# Patient Record
Sex: FEMALE | Employment: UNEMPLOYED | URBAN - METROPOLITAN AREA
[De-identification: names, ages, dates, MRNs, and addresses within clinical notes are randomized per-mention and may not be internally consistent; named-entity substitution may affect disease eponyms.]

---

## 2017-01-30 ENCOUNTER — OFFICE VISIT (OUTPATIENT)
Dept: FAMILY MEDICINE CLINIC | Age: 57
End: 2017-01-30

## 2017-01-30 VITALS
RESPIRATION RATE: 16 BRPM | HEART RATE: 77 BPM | OXYGEN SATURATION: 96 % | SYSTOLIC BLOOD PRESSURE: 140 MMHG | WEIGHT: 185.2 LBS | HEIGHT: 63 IN | DIASTOLIC BLOOD PRESSURE: 80 MMHG | TEMPERATURE: 98.6 F | BODY MASS INDEX: 32.82 KG/M2

## 2017-01-30 DIAGNOSIS — M72.2 PLANTAR FASCIITIS: Primary | ICD-10-CM

## 2017-01-30 DIAGNOSIS — Z12.39 BREAST CANCER SCREENING: ICD-10-CM

## 2017-01-30 DIAGNOSIS — E66.9 OBESITY, UNSPECIFIED OBESITY SEVERITY, UNSPECIFIED OBESITY TYPE: ICD-10-CM

## 2017-01-30 DIAGNOSIS — Z00.00 ROUTINE ADULT HEALTH MAINTENANCE: ICD-10-CM

## 2017-01-30 NOTE — PATIENT INSTRUCTIONS
TODAY, go to:   CHECK OUT    Please schedule the following appointments:  · Weight follow up with Dr. Anita Keys in 4 weeks     · Lab visit in May   · CPE the week after with Dr. Anita Keys    _____________________     Today you were seen for:  1. Plantar fasciitis   - do home exercises  - use ice  - continue mobic  - continue massage  - start PT    2. Start qsymia  Follow the instructions on the card  _____________________     Review your health maintenance below. Make plans to return and address anything that is due or will be due soon. Health Maintenance Due   Topic Date Due    Hepatitis C Test  1960    Stool testing for trace blood  05/28/2010    Breast Cancer Screening  11/16/2016          Plantar Fasciitis: Care Instructions  Your Care Instructions    Plantar fasciitis is pain and inflammation of the plantar fascia, the tissue at the bottom of your foot that connects the heel bone to the toes. The plantar fascia also supports the arch. If you strain the plantar fascia, it can develop small tears and cause heel pain when you stand or walk. Plantar fasciitis can be caused by running or other sports. It also may occur in people who are overweight or who have high arches or flat feet. You may get plantar fasciitis if you walk or stand for long periods, or have a tight Achilles tendon or calf muscles. You can improve your foot pain with rest and other care at home. It might take a few weeks to a few months for your foot to heal completely. Follow-up care is a key part of your treatment and safety. Be sure to make and go to all appointments, and call your doctor if you are having problems. It's also a good idea to know your test results and keep a list of the medicines you take. How can you care for yourself at home? · Rest your feet often. Reduce your activity to a level that lets you avoid pain. If possible, do not run or walk on hard surfaces. · Take pain medicines exactly as directed.   ¨ If the doctor gave you a prescription medicine for pain, take it as prescribed. ¨ If you are not taking a prescription pain medicine, take an over-the-counter anti-inflammatory medicine for pain and swelling, such as ibuprofen (Advil, Motrin) or naproxen (Aleve). Read and follow all instructions on the label. · Use ice massage to help with pain and swelling. You can use an ice cube or an ice cup several times a day. To make an ice cup, fill a paper cup with water and freeze it. Cut off the top of the cup until a half-inch of ice shows. Hold onto the remaining paper to use the cup. Rub the ice in small circles over the area for 5 to 7 minutes. · Contrast baths, which alternate hot and cold water, can also help reduce swelling. But because heat alone may make pain and swelling worse, end a contrast bath with a soak in cold water. · Wear a night splint if your doctor suggests it. A night splint holds your foot with the toes pointed up and the foot and ankle at a 90-degree angle. This position gives the bottom of your foot a constant, gentle stretch. · Do simple exercises such as calf stretches and towel stretches 2 to 3 times each day, especially when you first get up in the morning. These can help the plantar fascia become more flexible. They also make the muscles that support your arch stronger. Hold these stretches for 15 to 30 seconds per stretch. Repeat 2 to 4 times. ¨ Stand about 1 foot from a wall. Place the palms of both hands against the wall at chest level. Lean forward against the wall, keeping one leg with the knee straight and heel on the ground while bending the knee of the other leg. ¨ Sit down on the floor or a mat with your feet stretched in front of you. Roll up a towel lengthwise, and loop it over the ball of your foot. Holding the towel at both ends, gently pull the towel toward you to stretch your foot. · Wear shoes with good arch support.  Athletic shoes or shoes with a well-cushioned sole are good choices. · Try heel cups or shoe inserts (orthotics) to help cushion your heel. You can buy these at many shoe stores. · Put on your shoes as soon as you get out of bed. Going barefoot or wearing slippers may make your pain worse. · Reach and stay at a good weight for your height. This puts less strain on your feet. When should you call for help? Call your doctor now or seek immediate medical care if:  · You have heel pain with fever, redness, or warmth in your heel. · You cannot put weight on the sore foot. Watch closely for changes in your health, and be sure to contact your doctor if:  · You have numbness or tingling in your heel. · Your heel pain lasts more than 2 weeks. Where can you learn more? Go to http://candice-carina.info/. Eris Caballero in the search box to learn more about \"Plantar Fasciitis: Care Instructions. \"  Current as of: May 23, 2016  Content Version: 11.1  © 6458-2372 [employer]. Care instructions adapted under license by [employer] (which disclaims liability or warranty for this information). If you have questions about a medical condition or this instruction, always ask your healthcare professional. [address] any warranty or liability for your use of this information. Plantar Fasciitis: Exercises  Your Care Instructions  Here are some examples of typical rehabilitation exercises for your condition. Start each exercise slowly. Ease off the exercise if you start to have pain. Your doctor or physical therapist will tell you when you can start these exercises and which ones will work best for you. How to do the exercises  Note: Each exercise should create a pulling feeling but should not cause pain. Towel stretch    4. Sit with your legs extended and knees straight. 5. Place a towel around your foot just under the toes. 6. Hold each end of the towel in each hand, with your hands above your knees.   7. Pull back with the towel so that your foot stretches toward you. 8. Hold the position for at least 15 to 30 seconds. 9. Repeat 2 to 4 times a session, up to 5 sessions a day. Calf stretch    Note: This exercise stretches the muscles at the back of the lower leg (the calf) and the Achilles tendon. Do this exercise 3 or 4 times a day, 5 days a week. 2. Stand facing a wall with your hands on the wall at about eye level. Put the leg you want to stretch about a step behind your other leg. 3. Keeping your back heel on the floor, bend your front knee until you feel a stretch in the back leg. 4. Hold the stretch for 15 to 30 seconds. Repeat 2 to 4 times. Plantar fascia and calf stretch    Note: Stretching the plantar fascia and calf muscles can increase flexibility and decrease heel pain. You can do this exercise several times each day and before and after activity. 1. Stand on a step as shown above. Be sure to hold on to the banister. 2. Slowly let your heels down over the edge of the step as you relax your calf muscles. You should feel a gentle stretch across the bottom of your foot and up the back of your leg to your knee. 3. Hold the stretch about 15 to 30 seconds, and then tighten your calf muscle a little to bring your heel back up to the level of the step. Repeat 2 to 4 times. Towel curls    1. While sitting, place your foot on a towel on the floor and scrunch the towel toward you with your toes. 2. Then, also using your toes, push the towel away from you. Note: Make this exercise more challenging by placing a weighted object, such as a soup can, on the other end of the towel. [place] pickups    1. Put marbles on the floor next to a cup.  2. Using your toes, try to lift the marbles up from the floor and put them in the cup. Follow-up care is a key part of your treatment and safety. Be sure to make and go to all appointments, and call your doctor if you are having problems.  It's also a good idea to know your test results and keep a list of the medicines you take. Where can you learn more? Go to http://candice-carina.info/. Neeta Naranjo in the search box to learn more about \"Plantar Fasciitis: Exercises. \"  Current as of: May 23, 2016  Content Version: 11.1  © 5636-3231 [employer]. Care instructions adapted under license by [employer] (which disclaims liability or warranty for this information). If you have questions about a medical condition or this instruction, always ask your healthcare professional. [address] any warranty or liability for your use of this information.

## 2017-01-30 NOTE — PROGRESS NOTES
Chief Complaint   Patient presents with   [facility]    Foot Pain     Heel pain started over a month ago. 1. Have you been to the ER, urgent care clinic since your last visit? Hospitalized since your last visit? No    2. Have you seen or consulted any other health care providers outside of the [address] [place] [address] since your last visit? Include any pap smears or colon screening. No     Due a Mammogram and a screening colonoscopy. Had her flu shot at [facility].  I have reviewed Health Maintenance with the patient and updated.   Advance Care Planning information reviewed and given to the patient at a previous vist.

## 2017-01-30 NOTE — PROGRESS NOTES
[address] Visit   Patient ID:   Danny Quezada is a 64 y.o. female. Assessment/Plan:    Ashley Lee was seen today for establish care and foot pain. Diagnoses and all orders for this visit:    Plantar fasciitis  Reviewed home exercises, referral to PT. See pt instructions for more  -     REFERRAL TO PHYSICAL THERAPY    Obesity, unspecified obesity severity, unspecified obesity type  Had success with phentermine in past. Trial qsymia. Pt signed up for prescription card today. F/u in 4 months. Counseled on risks. Will need to monitor BP  -     phentermine-topiramate (QSYMIA) 7.5-46 mg CM24; Take 1 Tab by mouth daily. Max Daily Amount: 1 Tab. Take 1/2 tab for the first 14 days. Then increase to 1 tab daily. Breast cancer screening  -     [facility] MAMMO BI SCREENING INCL CAD; Future        Counselled pt on:  Patient health concerns. Lifestyle changes, importance of physical activity and healthy food choices, home PT exercises for feet. Counseled on numerous obesity medication options. Labs ordered for May CPE. Patient was offered a choice/choices in the treatment plan today. Patient expresses understanding of the plan and agrees with recommendations. Patient Instructions     TODAY, go to:   CHECK OUT    Please schedule the following appointments:  · Weight follow up with Dr. Gill Streeter in 4 weeks     · Lab visit in May   · CPE the week after with Dr. Gill Streeter    _____________________     Today you were seen for:  1. Plantar fasciitis   - do home exercises  - use ice  - continue mobic  - continue massage  - start PT    2. Start qsymia  Follow the instructions on the card  _____________________     Review your health maintenance below. Make plans to return and address anything that is due or will be due soon. Health Maintenance Due   Topic Date Due    Hepatitis C Test  1960    Stool testing for trace blood  05/28/2010    Breast Cancer Screening  11/16/2016     ?   Subjective: HPI:  January Hamilton is a 64 y.o. female being seen for:   Chief Complaint   Patient presents with   [address] Establish Care    Foot Pain     Heel pain started over a month ago. Establish Care  Past medical history, surgical history, social history, family history, medications, allergies reviewed and updated. See below for more detail    Heel pain  · Had bone spur in past 5-6 years, had injection  · started hurting again about a month ago  · Worse in night and then when getting up after that  · B/l heels  · Takes mobic daily  · Wears reasonable shoes    Weight concern  · Used phentermine 2 or 3 times in the past  · Tried to start again and had tingling all over so stop  · Phentermine worked well decreasing her wt  · She is thinking about going back to the gym  · She has decreased rice and bread, eating just salad. Screening and Prevention Due:  Health Maintenance Due   Topic Date Due    Hepatitis C Screening  1960    FOBT Q 1 YEAR AGE 50-75  05/28/2010    BREAST CANCER SCRN MAMMOGRAM  11/16/2016        Review of Systems  Otherwise, per HPI  Active Problem List:  Patient Active Problem List   Diagnosis Code    Vitamin D deficiency E55.9    Heel spur M77.30    Iron deficiency anemia D50.9    Fibrocystic breast changes N60.19    Other and unspecified hyperlipidemia E78.5    Back pain M54.9    Leg pain, left M79.605    Hyperglycemia R73.9    Hypertension I10     ? Objective:     Visit Vitals    /80 (BP 1 Location: Right arm, BP Patient Position: Sitting)    Pulse 77    Temp 98.6 °F (37 °C) (Oral)    Resp 16    Ht 5' 3\" (1.6 m)    Wt 185 lb 3.2 oz (84 kg)    SpO2 96%    BMI 32.81 kg/m2     PHQ 2 / 9, over the last two weeks 6/28/2016   Little interest or pleasure in doing things Not at all   Feeling down, depressed or hopeless Not at all   Total Score PHQ 2 0       Physical Exam   Constitutional: She appears well-developed and well-nourished. No distress.    Pulmonary/Chest: Effort normal.   Neurological: She is alert. Psychiatric: She has a normal mood and affect. Her behavior is normal.   . .Left Ankle Exam   Swelling: None. Tenderness   The patient is experiencing tenderness in the plantar heel. Range of Motion   Dorsiflexion:     Normal  Plantar flexion: Normal  Inversion:         Normal  Eversion:         Normal    Muscle Strength   Dorsiflexion:        5/5  Plantar flexion:      5/5  Anterior tibial:        5/5  Posterior tibial:      5/5  Gastrosoleus:        Peroneal muscle:    Tests   Anterior drawer: Negative. Varus tilt: Negative. Comments:  Skin intact    Right Ankle Exam   Swelling: None    Tenderness   The patient is experiencing tenderness in the plantar heel. Range of Motion   Dorsiflexion:     Normal  Plantar flexion: Normal  Inversion:         Normal  Eversion:         Normal    Muscle Strength   Dorsiflexion:       5/5  Plantar flexion:     5/5  Anterior tibial:       5/5  Posterior tibial:     5/5  Gastrosoleus:       Peroneal muscle:    Tests   Anterior drawer: Negative  Varus tilt: NegativeComments  Skin intact          Allergies   Allergen Reactions    Pcn [Penicillins] Hives    Lisinopril Cough     Dry cough    Nsaids (Non-Steroidal Anti-Inflammatory Drug) Other (comments)     Abdominal pain     Prior to Admission medications    Medication Sig Start Date End Date Taking? Authorizing Provider   phentermine-topiramate [facility]) 7.5-46 mg CM24 Take 1 Tab by mouth daily. Max Daily Amount: 1 Tab. Take 1/2 tab for the first 14 days. Then increase to 1 tab daily. 1/30/17  Yes Alyx Conner. Marilyn Braun MD   meloxicam (MOBIC) 15 mg tablet Take 15 mg by mouth daily. Yes Historical Provider   atorvastatin (LIPITOR) 20 mg tablet Take 1 Tab by mouth daily.  5/24/16  Yes Og Lopez MD   omeprazole (PRILOSEC) 10 mg capsule TAKE ONE CAPSULE BY MOUTH ONE TIME DAILY  2/3/16  Yes Og Lopez MD   losartan (COZAAR) 50 mg tablet TAKE ONE TABLET BY MOUTH ONE TIME DAILY 8/30/15  Yes Wes Beaulieu MD   cholecalciferol, vitamin D3, (VITAMIN D3) 2,000 unit Tab Take 5,000 Int'l Units by mouth daily. Yes Historical Provider     . Sukhdev Dickson Past Medical History   Diagnosis Date    Advanced care planning/counseling discussion 5/24/16    Allergic rhinitis, cause unspecified 10/2003    Breast mass, right 10/12/11     Right. Dr. Giorgi Pineda. benign. .    Carpal tunnel syndrome 09/2012     bilaterally. Dr. Martin Figures.  Chest pain 03/2012     Dr. Edel Tang    Elevated alkaline phosphatase level 05/16/04     125    Essential hypertension, benign 09/18/12    Fibrocystic breast changes     Heel spur 2010     Left. Dr. Cody Bhardwaj    Herpes zoster 07/2003     Right chest.    Hyperglycemia 8/23/2012    Iron deficiency anemia 10/2003    Menorrhagia 2006     due to endometrial polyps. Dr. Cordon Hence Other and unspecified hyperlipidemia 1766    Ovarian follicular cyst 73/47/30     bilaterally.  Tennis elbow 09/2012     Right. Dr. Bridger Brady.  Trigger finger 09/2012     Left thumb. Right middle. Dr. Bridger Brady.  Unspecified vitamin D deficiency 04/2009       Past Surgical History   Procedure Laterality Date    Hx dilation and curettage  12/18/2006     due to heavy menses, endometrial polyp benign. Dr. Pa Dutta.    Hx tubal ligation  1996    Us guided breast biopsy Right 10/12/2011     Right. Dr. Giorgi Pineda. Benign. 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         Social History Main Topics    Smoking status: Never Smoker    Smokeless tobacco: Never Used    Alcohol use No    Drug use: No    Sexual activity: Yes     Partners: Male     Birth control/ protection: Surgical      Comment: monogamous with  since 1980     Other Topics Concern     Service No    Blood Transfusions No    Caffeine Concern Yes     3 cups tea daily, 2 in AM and 1 in PM    Occupational Exposure No    Hobby Hazards No    Sleep Concern Yes     9-10 hours daily, wakes up at 7:30 and after son leaves for school, takes 1 hour nap    Stress Concern Yes     planning elaborate wedding for daughter    Weight Concern No    Special Diet No    Back Care No    Exercise No     doesn't     Social History Narrative       OB History      Para Term  AB TAB SAB Ectopic Multiple Living    5 4   1     4        Obstetric Comments    Postmenopausal. LMP around 47 yo  Patient's last menstrual period was 12/15/2011.   H/o abnl pap: yes            Family History   Problem Relation Age of Onset    Elevated Lipids Mother     Hypertension Mother     High Cholesterol Mother     Asthma Mother    Milton Cords Arthritis-osteo Mother     Hypertension Father     Heart Disease Father     Heart Attack Father 72    Diabetes Father     Heart Disease Brother 61     pacemaker    Hypertension Brother     Diabetes Brother     Asthma Sister    Milton Cords Migraines Sister

## 2017-05-01 DIAGNOSIS — Z00.00 ROUTINE ADULT HEALTH MAINTENANCE: ICD-10-CM

## 2017-05-24 LAB
ALBUMIN SERPL-MCNC: 4.5 G/DL (ref 3.5–5.5)
ALBUMIN/GLOB SERPL: 2.1 {RATIO} (ref 1.2–2.2)
ALP SERPL-CCNC: 131 IU/L (ref 39–117)
ALT SERPL-CCNC: 72 IU/L (ref 0–32)
AST SERPL-CCNC: 46 IU/L (ref 0–40)
BILIRUB SERPL-MCNC: 0.5 MG/DL (ref 0–1.2)
BUN SERPL-MCNC: 14 MG/DL (ref 6–24)
BUN/CREAT SERPL: 22 (ref 9–23)
CALCIUM SERPL-MCNC: 9.4 MG/DL (ref 8.7–10.2)
CHLORIDE SERPL-SCNC: 101 MMOL/L (ref 96–106)
CHOLEST SERPL-MCNC: 174 MG/DL (ref 100–199)
CO2 SERPL-SCNC: 26 MMOL/L (ref 18–29)
CREAT SERPL-MCNC: 0.64 MG/DL (ref 0.57–1)
ERYTHROCYTE [DISTWIDTH] IN BLOOD BY AUTOMATED COUNT: 13.5 % (ref 12.3–15.4)
EST. AVERAGE GLUCOSE BLD GHB EST-MCNC: 157 MG/DL
GLOBULIN SER CALC-MCNC: 2.1 G/DL (ref 1.5–4.5)
GLUCOSE SERPL-MCNC: 131 MG/DL (ref 65–99)
HBA1C MFR BLD: 7.1 % (ref 4.8–5.6)
HCT VFR BLD AUTO: 41.4 % (ref 34–46.6)
HCV AB S/CO SERPL IA: <0.1 S/CO RATIO (ref 0–0.9)
HDLC SERPL-MCNC: 41 MG/DL
HGB BLD-MCNC: 13.5 G/DL (ref 11.1–15.9)
INTERPRETATION, 910389: NORMAL
LDLC SERPL CALC-MCNC: 83 MG/DL (ref 0–99)
MCH RBC QN AUTO: 29.2 PG (ref 26.6–33)
MCHC RBC AUTO-ENTMCNC: 32.6 G/DL (ref 31.5–35.7)
MCV RBC AUTO: 89 FL (ref 79–97)
PLATELET # BLD AUTO: 237 X10E3/UL (ref 150–379)
POTASSIUM SERPL-SCNC: 4.6 MMOL/L (ref 3.5–5.2)
PROT SERPL-MCNC: 6.6 G/DL (ref 6–8.5)
RBC # BLD AUTO: 4.63 X10E6/UL (ref 3.77–5.28)
SODIUM SERPL-SCNC: 142 MMOL/L (ref 134–144)
TRIGL SERPL-MCNC: 251 MG/DL (ref 0–149)
VLDLC SERPL CALC-MCNC: 50 MG/DL (ref 5–40)
WBC # BLD AUTO: 7.2 X10E3/UL (ref 3.4–10.8)

## 2017-05-30 ENCOUNTER — OFFICE VISIT (OUTPATIENT)
Dept: FAMILY MEDICINE CLINIC | Age: 57
End: 2017-05-30

## 2017-05-30 VITALS
SYSTOLIC BLOOD PRESSURE: 140 MMHG | OXYGEN SATURATION: 96 % | HEART RATE: 88 BPM | HEIGHT: 63 IN | RESPIRATION RATE: 20 BRPM | TEMPERATURE: 97.5 F | DIASTOLIC BLOOD PRESSURE: 88 MMHG | WEIGHT: 184.3 LBS | BODY MASS INDEX: 32.66 KG/M2

## 2017-05-30 DIAGNOSIS — M77.30 HEEL SPUR, UNSPECIFIED LATERALITY: ICD-10-CM

## 2017-05-30 DIAGNOSIS — Z00.00 WELL WOMAN EXAM WITHOUT GYNECOLOGICAL EXAM: Primary | ICD-10-CM

## 2017-05-30 DIAGNOSIS — L81.9 PIGMENTED SKIN LESION: ICD-10-CM

## 2017-05-30 DIAGNOSIS — I10 ESSENTIAL HYPERTENSION: ICD-10-CM

## 2017-05-30 DIAGNOSIS — E55.9 VITAMIN D DEFICIENCY: ICD-10-CM

## 2017-05-30 DIAGNOSIS — E11.9 CONTROLLED TYPE 2 DIABETES MELLITUS WITHOUT COMPLICATION, WITHOUT LONG-TERM CURRENT USE OF INSULIN (HCC): ICD-10-CM

## 2017-05-30 DIAGNOSIS — R79.89 ELEVATED LFTS: ICD-10-CM

## 2017-05-30 DIAGNOSIS — E78.1 HIGH TRIGLYCERIDES: ICD-10-CM

## 2017-05-30 RX ORDER — METFORMIN HYDROCHLORIDE 500 MG/1
TABLET, EXTENDED RELEASE ORAL
Qty: 120 TAB | Refills: 2 | Status: SHIPPED | OUTPATIENT
Start: 2017-05-30 | End: 2017-12-04 | Stop reason: SDUPTHER

## 2017-05-30 NOTE — MR AVS SNAPSHOT
Visit Information Date & Time Provider Department Dept. Phone Encounter #  
 5/30/2017  4:20 PM MD Jay PrietoNarcisa [phone] [id number] Upcoming Health Maintenance Date Due FOBT Q 1 YEAR AGE 50-75 5/28/2010 PAP AKA CERVICAL CYTOLOGY 6/28/2017 INFLUENZA AGE 9 TO ADULT 8/1/2017 BREAST CANCER SCRN MAMMOGRAM 5/15/2018 DTaP/Tdap/Td series (2 - Td) 4/15/2019 Allergies as of 5/30/2017  Review Complete On: 5/30/2017 By: Ben Pagan LPN Severity Noted Reaction Type Reactions Pcn [Penicillins] High 04/16/2010    Hives Lisinopril  01/23/2013   Side Effect Cough Dry cough Nsaids (Non-steroidal Anti-inflammatory Drug)  12/03/2012    Other (comments) Abdominal pain Current Immunizations  Reviewed on 1/30/2017 Name Date Influenza Vaccine 10/1/2016, 10/11/2013 Influenza Vaccine (Quad) PF 10/7/2014 Influenza Vaccine Split 10/15/2012 TDAP Vaccine 4/15/2009 Not reviewed this visit You Were Diagnosed With   
  
 Codes Comments Well woman exam without gynecological exam    -  Primary ICD-10-CM: Z00.00 ICD-9-CM: V70.0 Vitamin D deficiency     ICD-10-CM: E55.9 ICD-9-CM: 268.9 Heel spur, unspecified laterality     ICD-10-CM: M77.30 ICD-9-CM: 726.73 Essential hypertension     ICD-10-CM: I10 
ICD-9-CM: 401.9 High triglycerides     ICD-10-CM: E78.1 ICD-9-CM: 272.1 Controlled type 2 diabetes mellitus without complication, without long-term current use of insulin ([facility]     ICD-10-CM: E11.9 ICD-9-CM: 250.00 Elevated LFTs     ICD-10-CM: R94.5 ICD-9-CM: 790.6 Pigmented skin lesion     ICD-10-CM: L81.9 ICD-9-CM: 709.00 Vitals BP Pulse Temp Resp Height(growth percentile) Weight(growth percentile) 140/88 88 97.5 °F (36.4 °C) (Oral) 20 5' 3\" (1.6 m) 184 lb 4.8 oz (83.6 kg) LMP SpO2 BMI OB Status Smoking Status 12/15/2011 96% 32.65 kg/m2 Postmenopausal Never Smoker BMI and BSA Data Body Mass Index Body Surface Area  
 32.65 kg/m 2 1.93 m 2 Preferred Pharmacy Pharmacy Name Phone [facility] Jada Griffith [facility] [phone] Your Updated Medication List  
  
   
This list is accurate as of: 5/30/17  5:45 PM.  Always use your most recent med list.  
  
  
  
  
 atorvastatin 20 mg tablet Commonly known as:  LIPITOR Take 1 Tab by mouth daily. losartan 50 mg tablet Commonly known as:  COZAAR  
TAKE ONE TABLET BY MOUTH ONE TIME DAILY  
  
 meloxicam 15 mg tablet Commonly known as:  MOBIC Take 15 mg by mouth daily. metFORMIN  mg tablet Commonly known as:  GLUCOPHAGE XR Week 1: 1 tab by mouth daily. Week 2: take 1 tab by mouth twice a day. Week 4: take 2 tab by mouth twice a day. omeprazole 10 mg capsule Commonly known as:  PRILOSEC  
TAKE ONE CAPSULE BY MOUTH ONE TIME DAILY  
  
 VITAMIN D3 2,000 unit Tab Generic drug:  cholecalciferol (vitamin D3) Take 5,000 Int'l Units by mouth daily. Prescriptions Sent to Pharmacy Refills  
 metFORMIN ER (GLUCOPHAGE XR) 500 mg tablet 2 Sig: Week 1: 1 tab by mouth daily. Week 2: take 1 tab by mouth twice a day. Week 4: take 2 tab by mouth twice a day. Class: Normal  
 Pharmacy: [facility] [address] [facility], [address] [place] [address] Ph #: [phone] We Performed the Following HEMOGLOBIN A1C WITH EAG [43839 CPT(R)] METABOLIC PANEL, COMPREHENSIVE [53303 CPT(R)] Patient Instructions TODAY, please go to: CHECK OUT Please schedule the following appointments at [facility] OUT: 
· Lab visit, NOT fasting in 3 months · Diabetes, weight, HTN follow up with Dr. Alyse Gunn the week after labs 
 
_____________________ Today's Plan: 
 
 
Hepatitis C test is negative.  
Your red blood cell count is normal.  
Your white blood cell count is normal.  
 Your platelet count is normal.  
Your kidney function is normal.  
 
Your liver tests are elevated. You have Diabetes. This means your blood sugars are higher than normal on average. The following can help you improve and control your blood sugar. · Food Choices · Be Mindful of CARBOHYDRATES. Carbohydrates= sugars. This means breads, pasta, rice, chips, desserts, starchy vegetables, etc. 
· Decrease how OFTEN you have carbohydrates · Decrease how MUCH carbohydrates you eat at one time · Choose carbs that are better for you, like whole grain. · Try to eliminate sugar from your drinks. (Soda, sweet tea, lemonade, juice, gatorade, alcohol, etc). · Let Dr. Arash Pyle know if you would like to see Nutrition to discuss more · Physical Activity · Move more. · Try to walk 150 minutes per week. · Try to walk 10,000 steps per day · Consider counting your steps on your smart phone. · Weight Loss · Moving more and eating better are good for you no matter what. · If you go enough of them you can achieve a healthier weight. This will help your blood sugars. · Medication · Sometimes you need medicine to help. Metformin can help. You would take this by mouth every day.  
 
_____________________ Your blood pressure is too high. Overtime your blood pressure can hurt your body, making it more likely for you to have heart disease, stroke, kidney disease, etc. It is important to get your blood pressure in better control. Your goal Blood pressure is less than 140/90. The top number is called the systolic blood pressure. The bottom number is called the diastolic blood pressure. If you check your blood pressure at home, write it down and bring it to your follow up appointments. If you have mychart you can enter them there for Dr. Arash Pyle to review. The following can help you improve and control your blood pressure. Food Choices · Learn about the DASH diet · Be Mindful of SODIUM. · Sodium can hide in lots of foods. · Let Dr. Adalgisa Sanders know if you would like to see Nutrition to discuss more Physical Activity · Move more. · Try to walk 150 minutes per week. · Try to walk 10,000 steps per day · Consider counting your steps on your smart phone. Weight Loss · Moving more and eating better are good for you no matter what. · If you go enough of them you can achieve a healthier weight. This will help your blood sugars. Alcohol · Decrease or stop drinking alcohol. Sleep Apnea · If you have sleep apnea, make sure you are using your CPAP as advised. Medication · Often people need medication. Be sure to take as advised. .  
 
 
_____________________ Review your health maintenance below. Make plans to return and address anything that is due or will be due soon. Health Maintenance Due Topic Date Due  Stool testing for trace blood  05/28/2010  Cervical Cancer Screening  06/28/2017  
 
 
 
_____________________ Are you stressed out? Overwhelmed? In pain? Feeling disconnected? Consider MINDFULNESS. .. 
[email or web address] 
_____________________ If you need to get your labs done at [facility], visit this site to find a convenient location: [email or web address] DASH Diet: Care Instructions Your Care Instructions The DASH diet is an eating plan that can help lower your blood pressure. DASH stands for Dietary Approaches to Stop Hypertension. Hypertension is high blood pressure. The DASH diet focuses on eating foods that are high in calcium, potassium, and magnesium. These nutrients can lower blood pressure. The foods that are highest in these nutrients are fruits, vegetables, low-fat dairy products, nuts, seeds, and legumes.  But taking calcium, potassium, and magnesium supplements instead of eating foods that are high in those nutrients does not have the same effect. The DASH diet also includes whole grains, fish, and poultry. The DASH diet is one of several lifestyle changes your doctor may recommend to lower your high blood pressure. Your doctor may also want you to decrease the amount of sodium in your diet. Lowering sodium while following the DASH diet can lower blood pressure even further than just the DASH diet alone. Follow-up care is a key part of your treatment and safety. Be sure to make and go to all appointments, and call your doctor if you are having problems. It's also a good idea to know your test results and keep a list of the medicines you take. How can you care for yourself at home? Following the DASH diet · Eat 4 to 5 servings of fruit each day. A serving is 1 medium-sized piece of fruit, ½ cup chopped or canned fruit, 1/4 cup dried fruit, or 4 ounces (½ cup) of fruit juice. Choose fruit more often than fruit juice. · Eat 4 to 5 servings of vegetables each day. A serving is 1 cup of lettuce or raw leafy vegetables, ½ cup of chopped or cooked vegetables, or 4 ounces (½ cup) of vegetable juice. Choose vegetables more often than vegetable juice. · Get 2 to 3 servings of low-fat and fat-free dairy each day. A serving is 8 ounces of milk, 1 cup of yogurt, or 1 ½ ounces of cheese. · Eat 6 to 8 servings of grains each day. A serving is 1 slice of bread, 1 ounce of dry cereal, or ½ cup of cooked rice, pasta, or cooked cereal. Try to choose whole-grain products as much as possible. · Limit lean meat, poultry, and fish to 2 servings each day. A serving is 3 ounces, about the size of a deck of cards. · Eat 4 to 5 servings of nuts, seeds, and legumes (cooked dried beans, lentils, and split peas) each week. A serving is 1/3 cup of nuts, 2 tablespoons of seeds, or ½ cup of cooked beans or peas. · Limit fats and oils to 2 to 3 servings each day. A serving is 1 teaspoon of vegetable oil or 2 tablespoons of salad dressing. · Limit sweets and added sugars to 5 servings or less a week. A serving is 1 tablespoon jelly or jam, ½ cup sorbet, or 1 cup of lemonade. · Eat less than 2,300 milligrams (mg) of sodium a day. If you limit your sodium to 1,500 mg a day, you can lower your blood pressure even more. Tips for success · Start small. Do not try to make dramatic changes to your diet all at once. You might feel that you are missing out on your favorite foods and then be more likely to not follow the plan. Make small changes, and stick with them. Once those changes become habit, add a few more changes. · Try some of the following: ¨ Make it a goal to eat a fruit or vegetable at every meal and at snacks. This will make it easy to get the recommended amount of fruits and vegetables each day. ¨ Try yogurt topped with fruit and nuts for a snack or healthy dessert. ¨ Add lettuce, tomato, cucumber, and onion to sandwiches. ¨ Combine a ready-made pizza crust with low-fat mozzarella cheese and lots of vegetable toppings. Try using tomatoes, squash, spinach, broccoli, carrots, cauliflower, and onions. ¨ Have a variety of cut-up vegetables with a low-fat dip as an appetizer instead of chips and dip. ¨ Sprinkle sunflower seeds or chopped almonds over salads. Or try adding chopped walnuts or almonds to cooked vegetables. ¨ Try some vegetarian meals using beans and peas. Add garbanzo or kidney beans to salads. Make burritos and tacos with mashed gonzalez beans or black beans. Where can you learn more? Go to http://candice-carina.info/. Enter P302 in the search box to learn more about \"DASH Diet: Care Instructions. \" Current as of: March 23, 2016 Content Version: 11.2 © 4771-4973 [employer]. Care instructions adapted under license by [employer] (which disclaims liability or warranty for this information).  If you have questions about a medical condition or this instruction, always ask your healthcare professional. [address] any warranty or liability for your use of this information. Mediterranean Diet: Care Instructions Your Care Instructions The Mediterranean diet features foods eaten in [place], [place], [place] and [place], and other countries that border the [facility]. It emphasizes eating a diet rich in fruits, vegetables, nuts, and high-fiber grains, and limits meat, cheese, and sweets. The Mediterranean diet may: · Prevent heart disease and lower the risk of a heart attack or stroke. · Prevent type 2 diabetes. · Prevent Alzheimer's disease and other dementia. · Prevent depression. · Prevent Parkinson's disease. This diet contains more fat than other heart-healthy diets. But the fats are mainly from nuts, unsaturated oils, such as fish oils, olive oil, and certain nut or seed oils (such as canola, soybean, or flaxseed oil). These types of oils may help protect the heart and blood vessels. Follow-up care is a key part of your treatment and safety. Be sure to make and go to all appointments, and call your doctor if you are having problems. It's also a good idea to know your test results and keep a list of the medicines you take. How can you care for yourself at home? What to eat · Eat a variety of fruits and vegetables each day, such as grapes, blueberries, tomatoes, broccoli, peppers, figs, olives, spinach, eggplant, beans, lentils, and chickpeas. · Eat a variety of whole-grain foods each day, such as oats, brown rice, and whole wheat bread, pasta, and couscous. · Eat fish at least 2 times a week. Try tuna, salmon, mackerel, lake trout, herring, or sardines. · Eat moderate amounts of low-fat dairy products, such as milk, cheese, or yogurt. · Eat moderate amounts of poultry and eggs.  
· Choose healthy (unsaturated) fats, such as nuts, olive oil and certain nut or seed oils like canola, soybean, and flaxseed. · Limit unhealthy (saturated) fats, such as butter, palm oil, and coconut oil. And limit fats found in animal products, such as meat and dairy products made with whole milk. Try to eat red meat only a few times a month in very small amounts. · Limit sweets and desserts to only a few times a week. This includes sugar-sweetened drinks like soda. The Mediterranean diet may also include red wine with your meal1 glass each day for women and up to 2 glasses a day for men. Tips for changing your diet · Dip bread in a mix of olive oil and fresh herbs instead of using butter. · Add avocado slices to your sandwich instead of montemayor. · Have fish for lunch or dinner instead of red meat. Brush the fish with olive oil, and broil or grill it. · Sprinkle your salad with seeds or nuts instead of cheese. · Cook with olive or canola oil instead of butter or oils that are high in saturated fat. · Switch from 2% milk or whole milk to 1% or fat-free milk. · Dip raw vegetables in a vinaigrette dressing or hummus instead of dips made from mayonnaise or sour cream. 
· Have a piece of fruit for dessert instead of a piece of cake. Try baked apples, or have some dried fruit. Part of the Mediterranean diet is being active. Get at least 30 minutes of exercise on most days of the week. Walking is a good choice. You also may want to do other activities, such as running, swimming, cycling, or playing tennis or team sports. Where can you learn more? Go to http://candice-carina.info/. Enter O407 in the search box to learn more about \"Mediterranean Diet: Care Instructions. \" Current as of: October 21, 2016 Content Version: 11.2 © 6816-7121 [employer]. Care instructions adapted under license by [employer] (which disclaims liability or warranty for this information).  If you have questions about a medical condition or this instruction, always ask your healthcare professional. [address] any warranty or liability for your use of this information. Hyperlipidemia: After Your Visit Your Care Instructions Hyperlipidemia is too much fat in your blood. The body has several kinds of fat, including cholesterol and triglycerides. Your body needs fat for many things, such as making new cells. But too much fat in your blood increases your chances of having a heart attack or stroke. You may be able to lower your cholesterol and triglycerides with a heart-healthy diet, exercise, and if needed, medicine. Your doctor may want you to try lifestyle changes first to see whether they lower the fat in your blood. You may need to take medicine if lifestyle changes do not lower the fat in your blood enough. Follow-up care is a key part of your treatment and safety. Be sure to make and go to all appointments, and call your doctor if you are having problems. Its also a good idea to know your test results and keep a list of the medicines you take. How can you care for yourself at home? Take your medicines · Take your medicines exactly as prescribed. Call your doctor if you think you are having a problem with your medicine. · If you take medicine to lower your cholesterol, go to follow-up visits. You will need to have blood tests. · Do not take large doses of niacin, which is a B vitamin, while taking medicine called statins. It may increase the chance of muscle pain and liver problems. · Talk to your doctor about avoiding grapefruit juice if you are taking statins. Grapefruit juice can raise the level of this medicine in your blood. This could increase side effects. Eat more fruits, vegetables, and fiber · Fruits and vegetables have lots of nutrients that help protect against heart disease, and they have littleif anyfat.  Try to eat at least five servings a day. Dark green, deep orange, or yellow fruits and vegetables are healthy choices. · Keep carrots, celery, and other veggies handy for snacks. Buy fruit that is in season and store it where you can see it so that you will be tempted to eat it. Cook dishes that have a lot of veggies in them, such as stir-fries and soups. · Foods high in fiber may reduce your cholesterol and provide important vitamins and minerals. High-fiber foods include whole-grain cereals and breads, oatmeal, beans, brown rice, citrus fruits, and apples. · Buy whole-grain breads and cereals instead of white bread and pastries. Limit saturated fat · Read food labels and try to avoid saturated fat and trans fat. They increase your risk of heart disease. · Use olive or canola oil when you cook. Try cholesterol-lowering spreads, such as Benecol or Take Control. · Bake, broil, grill, or steam foods instead of frying them. · Limit the amount of high-fat meats you eat, including hot dogs and sausages. Cut out all visible fat when you prepare meat. · Eat fish, skinless poultry, and soy products such as tofu instead of high-fat meats. Soybeans may be especially good for your heart. Eat at least two servings of fish a week. Certain fish, such as salmon, contain omega-3 fatty acids, which may help reduce your risk of heart attack. · Choose low-fat or fat-free milk and dairy products. Get exercise, limit alcohol, and quit smoking · Get more exercise. Work with your doctor to set up an exercise program. Even if you can do only a small amount, exercise will help you get stronger, have more energy, and manage your weight and your stress. Walking is an easy way to get exercise. Gradually increase the amount you walk every day. Aim for at least 30 minutes on most days of the week. You also may want to swim, bike, or do other activities. · Limit alcohol to no more than 2 drinks a day for men and 1 drink a day for women. · Do not smoke. If you need help quitting, talk to your doctor about stop-smoking programs and medicines. These can increase your chances of quitting for good. When should you call for help? Call 911 anytime you think you may need emergency care. For example, call if: 
· You have symptoms of a heart attack. These may include: ¨ Chest pain or pressure, or a strange feeling in the chest. 
¨ Sweating. ¨ Shortness of breath. ¨ Nausea or vomiting. ¨ Pain, pressure, or a strange feeling in the back, neck, jaw, or upper belly or in one or both shoulders or arms. ¨ Lightheadedness or sudden weakness. ¨ A fast or irregular heartbeat. After you call 911, the  may tell you to chew 1 adult-strength or 2 to 4 low-dose aspirin. Wait for an ambulance. Do not try to drive yourself. · You have signs of a stroke. These may include: 
¨ Sudden numbness, paralysis, or weakness in your face, arm, or leg, especially on only one side of your body. ¨ New problems with walking or balance. ¨ Sudden vision changes. ¨ Drooling or slurred speech. ¨ New problems speaking or understanding simple statements, or feeling confused. ¨ A sudden, severe headache that is different from past headaches. · You passed out (lost consciousness). Call your doctor now or seek immediate medical care if: 
· You have muscle pain or weakness. Watch closely for changes in your health, and be sure to contact your doctor if: 
· You are very tired. · You have an upset stomach, gas, constipation, or belly pain or cramps. Where can you learn more? Go to Jobr.be Enter C406 in the search box to learn more about \"Hyperlipidemia: After Your Visit. \"  
© 7484-7130 Healthwise, Incorporated. Care instructions adapted under license by [employer] (which disclaims liability or warranty for this information).  This care instruction is for use with your licensed healthcare professional. If you have questions about a medical condition or this instruction, always ask your healthcare professional. [address] any warranty or liability for your use of this information. Content Version: 7.1.689945; Last Revised: October 13, 2011 Nutrition Tips for Diabetes: After Your Visit Your Care Instructions A healthy diet is important to manage diabetes. It helps you lose weight (if you need to) and keep it off. It gives you the nutrition and energy your body needs and helps prevent heart disease. But a diet for diabetes does not mean that you have to eat special foods. You can eat what your family eats, including occasional sweets and other favorites. But you do have to pay attention to how often you eat and how much you eat of certain foods. The right plan for you will give you meals that help you keep your blood sugar at healthy levels. Try to eat a variety of foods and to spread carbohydrate throughout the day. Carbohydrate raises blood sugar higher and more quickly than any other nutrient does. Carbohydrate is found in sugar, breads and cereals, fruit, starchy vegetables such as potatoes and corn, and milk and yogurt. You may want to work with a dietitian or diabetes educator to help you plan meals and snacks. A dietitian or diabetes educator also can help you lose weight if that is one of your goals. The following tips can help you enjoy your meals and stay healthy. Follow-up care is a key part of your treatment and safety. Be sure to make and go to all appointments, and call your doctor if you are having problems. Its also a good idea to know your test results and keep a list of the medicines you take. How can you care for yourself at home? · Learn which foods have carbohydrate and how much carbohydrate to eat. A dietitian or diabetes educator can help you learn to keep track of how much carbohydrate you eat. · Spread carbohydrate throughout the day. Eat some carbohydrate at all meals, but do not eat too much at any one time. · Plan meals to include food from all the food groups. These are the food groups and some example portion sizes: ¨ Grains: 1 slice of bread (1 ounce), ½ cup of cooked cereal, and 1/3 cup of cooked pasta or rice. These have about 15 grams of carbohydrate in a serving. Choose whole grains such as whole wheat bread or crackers, oatmeal, and brown rice more often than refined grains. ¨ Fruit: 1 small fresh fruit, such as an apple or orange; ½ of a banana; ½ cup of chopped, cooked, or canned fruit; ½ cup of fruit juice; 1 cup of melon or raspberries; and 2 tablespoons of dried fruit. These have about 15 grams of carbohydrate in a serving. ¨ Dairy: 1 cup of nonfat or low-fat milk and 2/3 cup of plain yogurt. These have about 15 grams of carbohydrate in a serving. ¨ Protein foods: Beef, chicken, turkey, fish, eggs, tofu, cheese, cottage cheese, and peanut butter. A serving size of meat is 3 ounces, which is about the size of a deck of cards. Examples of meat substitute serving sizes (equal to 1 ounce of meat) are 1/4 cup of cottage cheese, 1 egg, 1 tablespoon of peanut butter, and ½ cup of tofu. These have very little or no carbohydrate per serving. ¨ Vegetables: Starchy vegetables such as ½ cup of cooked dried beans, peas, potatoes, or corn have about 15 grams of carbohydrate. Nonstarchy vegetables have very little carbohydrate, such as 1 cup of raw leafy vegetables (such as spinach), ½ cup of other vegetables (cooked or chopped), and 3/4 cup of vegetable juice. · Use the plate format to plan meals. It is a good, quick way to make sure that you have a balanced meal. It also helps you spread carbohydrate throughout the day. You divide your plate by types of foods.  Put vegetables on half the plate, meat or meat substitutes on one-quarter of the plate, and a grain or starchy vegetable (such as brown rice or a potato) in the final quarter of the plate. To this you can add a small piece of fruit and 1 cup of milk or yogurt, depending on how much carbohydrate you are supposed to eat at a meal. 
· Talk to your dietitian or diabetes educator about ways to add limited amounts of sweets into your meal plan. You can eat these foods now and then, as long as you include the amount of carbohydrate they have in your daily carbohydrate allowance. · If you drink alcohol, limit it to no more than 1 drink a day for women and 2 drinks a day for men. If you are pregnant, no amount of alcohol is known to be safe. · Protein, fat, and fiber do not raise blood sugar as much as carbohydrate does. If you eat a lot of these nutrients in a meal, your blood sugar will rise more slowly than it would otherwise. · Limit saturated fats, such as those from meat and dairy products. Try to replace it with monounsaturated fat, such as olive oil. This is a healthier choice because people who have diabetes are at higher-than-average risk of heart disease. But use a modest amount of olive oil. A tablespoon of olive oil has 14 grams of fat and 120 calories. · Exercise lowers blood sugar. If you take insulin by shots or pump, you can use less than you would if you were not exercising. Keep in mind that timing matters. If you exercise within 1 hour after a meal, your body may need less insulin for that meal than it would if you exercised 3 hours after the meal. Test your blood sugar to find out how exercise affects your need for insulin. · Exercise on most days of the week. Aim for at least 30 minutes. Exercise helps you stay at a healthy weight and helps your body use insulin. Walking is an easy way to get exercise. Gradually increase the amount you walk every day. You also may want to swim, bike, or do other activities. When you eat out · Learn to estimate the serving sizes of foods that have carbohydrate. If you measure food at home, it will be easier to estimate the amount in a serving of restaurant food. · If the meal you order has too much carbohydrate (such as potatoes, corn, or baked beans), ask to have a low-carbohydrate food instead. Ask for a salad or green vegetables. · If you use insulin, check your blood sugar before and after eating out to help you plan how much to eat in the future. · If you eat more carbohydrate at a meal than you had planned, take a walk or do other exercise. This will help lower your blood sugar. Where can you learn more? Go to [employer]be Enter I068 in the search box to learn more about \"Nutrition Tips for Diabetes: After Your Visit. \"  
© 7623-8565 Healthwise, Incorporated. Care instructions adapted under license by Kate Torres (which disclaims liability or warranty for this information). This care instruction is for use with your licensed healthcare professional. If you have questions about a medical condition or this instruction, always ask your healthcare professional. [address] any warranty or liability for your use of this information. Content Version: 61.4.648296; Current as of: June 4, 2014 Introducing [facility]! Dear Amber Santos: Thank you for requesting a [facility] account. Our records indicate that you have previously registered for a [facility] account but its currently inactive. Please call our [facility] support line at [phone]. Additional Information If you have questions, please visit the Frequently Asked Questions section of the [facility] website at [email or web address] Remember, [facility] is NOT to be used for urgent needs. For medical emergencies, dial 911. Now available from your iPhone and Android! Please provide this summary of care documentation to your next provider. Your primary care clinician is listed as Kristine Castrejon. Katharine George. If you have any questions after today's visit, please call [phone].

## 2017-05-30 NOTE — PROGRESS NOTES
[address] Visit   Patient ID:   Izzy Davalos is a 62 y.o. female. Assessment/Plan:    Judah Church was seen today for follow-up and plantar fasciitis. Diagnoses and all orders for this visit:    Well woman exam without gynecological exam  #Healthcare maintenance/ Preventive:  - discussed diet and exercise  - screened for alcohol abuse: neg  - screened for depression: neg  - screened for tobacco use: neg  - HM : will need additional review on f/u  - Labs reviewed in detail as noted. Vitamin D deficiency  Check at 6 month visit    Heel spur, unspecified laterality  May be source of pain in addition to plantar fasciitis. F/u with PT or chiropractor. Ortho or podiatry referral if not improving. Recommended heel cups. Essential hypertension  Counseled on lifestyle modification    High triglycerides  Counseled on lifestyle change. Does not require statin use at this time. Re-eval on f/u    Controlled type 2 diabetes mellitus without complication, without long-term current use of insulin (HCC)  -     metFORMIN ER (GLUCOPHAGE XR) 500 mg tablet; Week 1: 1 tab by mouth daily. Week 2: take 1 tab by mouth twice a day. Week 4: take 2 tab by mouth twice a day. -     HEMOGLOBIN A1C WITH EAG    Elevated LFTs  -     METABOLIC PANEL, COMPREHENSIVE    Pigmented skin lesion  CTM on f/u 3X4.5mm on dorsal hand. Other orders  -     VITAMIN D, 25 HYDROXY         Counselled pt on:  Patient health concerns, plan as outlined in patient instructions and above. Patient was offered a choice/choices in the treatment plan today. Patient expresses understanding of the plan and agrees with recommendations.   For today's visit, I did the following:  · Reviewed labs in detail or ordered lab    Patient Instructions     TODAY, please go to:   CHECK OUT     Please schedule the following appointments at [facility] OUT:  · Lab visit, NOT fasting in 3 months  · Diabetes, weight, HTN follow up with Dr. Kenneth Medel the week after labs    _____________________     Today's Plan:      Hepatitis C test is negative. Your red blood cell count is normal.   Your white blood cell count is normal.   Your platelet count is normal.   Your kidney function is normal.     Your liver tests are elevated. You have Diabetes. This means your blood sugars are higher than normal on average. The following can help you improve and control your blood sugar. · Food Choices  · Be Mindful of CARBOHYDRATES. Carbohydrates= sugars. This means breads, pasta, rice, chips, desserts, starchy vegetables, etc.  · Decrease how OFTEN you have carbohydrates  · Decrease how MUCH carbohydrates you eat at one time  · Choose carbs that are better for you, like whole grain. · Try to eliminate sugar from your drinks. (Soda, sweet tea, lemonade, juice, gatorade, alcohol, etc). · Let Dr. Geovanny Contreras know if you would like to see Nutrition to discuss more  · Physical Activity  · Move more. · Try to walk 150 minutes per week. · Try to walk 10,000 steps per day  · Consider counting your steps on your smart phone. · Weight Loss  · Moving more and eating better are good for you no matter what. · If you go enough of them you can achieve a healthier weight. This will help your blood sugars. · Medication  · Sometimes you need medicine to help. Metformin can help. You would take this by mouth every day.     _____________________     Your blood pressure is too high. Overtime your blood pressure can hurt your body, making it more likely for you to have heart disease, stroke, kidney disease, etc. It is important to get your blood pressure in better control. Your goal Blood pressure is less than 140/90. The top number is called the systolic blood pressure. The bottom number is called the diastolic blood pressure. If you check your blood pressure at home, write it down and bring it to your follow up appointments. If you have mychart you can enter them there for Dr. Geovanny Contreras to review. The following can help you improve and control your blood pressure. Food Choices  · Learn about the DASH diet  · Be Mindful of SODIUM. · Sodium can hide in lots of foods. · Let Dr. Jayden Jasmine know if you would like to see Nutrition to discuss more  Physical Activity  · Move more. · Try to walk 150 minutes per week. · Try to walk 10,000 steps per day  · Consider counting your steps on your smart phone. Weight Loss  · Moving more and eating better are good for you no matter what. · If you go enough of them you can achieve a healthier weight. This will help your blood sugars. Alcohol  · Decrease or stop drinking alcohol. Sleep Apnea  · If you have sleep apnea, make sure you are using your CPAP as advised. Medication  · Often people need medication. Be sure to take as advised. .       _____________________     Review your health maintenance below. Make plans to return and address anything that is due or will be due soon. Health Maintenance Due   Topic Date Due    Stool testing for trace blood  05/28/2010    Cervical Cancer Screening  06/28/2017         _____________________     Are you stressed out? Overwhelmed? In pain? Feeling disconnected? Consider MIND[employer]NESS. ..  [email or web address]  _____________________     If you need to get your labs done at [facility], visit this site to find a convenient location: [email or web address]        Subjective:   HPI:  Verner Horseman is a 62 y.o. female being seen for:   Chief Complaint   Patient presents with    Follow-up     6 month    Plantar Fasciitis       In dec going to wedding in [place] and wants to loose 20-25 lbs over the next 6 months    Lab review  · Labs reviewed in detail. · Going on vacation in June, to [place] where dtr is then [facility])    Plantar Fasciitis  · Did not go to PT or chiropractor. · Did massage and ice at home. · Tried mobic at night. Plus or minus.    · Puts vicks on it massages and goes back to sleep. · Worse at evening and night before bed  · Goes on trip in 1-2 days. · Did some home exercises    BP  · Has been 140s SBP  · Gets up in AM, cooks for 10 people daily. In the kitchen all day. Then cooks dinner and sits for an hour then goes upstairs and cleans. Sometimes to [employer]. · Feels like food could be better  · Eats lots of rice and bread, sodium    Health Maintenance        reports that she has never smoked. She has never used smokeless tobacco. She reports that she does not drink alcohol or use illicit drugs. Screening  Health Maintenance Due   Topic Date Due    FOOT EXAM Q1  05/28/1970    MICROALBUMIN Q1  05/28/1970    EYE EXAM RETINAL OR DILATED Q1  05/28/1970    Pneumococcal 19-64 Medium Risk (1 of 1 - PPSV23) 05/28/1979    FOBT Q 1 YEAR AGE 50-75  05/28/2010    PAP AKA CERVICAL CYTOLOGY  06/28/2017       · A1C:      Lab Results   Component Value Date/Time    Hemoglobin A1c 7.1 05/23/2017 09:49 AM     · Lipids:  Lab Results   Component Value Date/Time    Cholesterol, total 174 05/23/2017 09:49 AM    HDL Cholesterol 41 05/23/2017 09:49 AM    LDL, calculated 83 05/23/2017 09:49 AM    VLDL, calculated 50 05/23/2017 09:49 AM    Triglyceride 251 05/23/2017 09:49 AM    CHOL/HDL Ratio 5.7 05/04/2010 10:53 AM     · Depression:     PHQ over the last two weeks 5/30/2017   Little interest or pleasure in doing things Not at all   Feeling down, depressed or hopeless Not at all   Total Score PHQ 2 0     · Hepatitis C (pt born [birth year]):    Lab Results   Component Value Date/Time    Hep C Virus Ab <0.1 05/23/2017 09:49 AM          Review of Systems    Active Problem List:  Patient Active Problem List   Diagnosis Code    Vitamin D deficiency E55.9    Heel spur M77.30    Iron deficiency anemia D50.9    Fibrocystic breast changes N60.19    Other and unspecified hyperlipidemia E78.5    Back pain M54.9    Leg pain, left M79.605    Hyperglycemia R73.9    Hypertension I10    Controlled type 2 diabetes mellitus without complication, without long-term current use of insulin (HCC) E11.9    High triglycerides E78.1    Pigmented skin lesion L81.9     ? Objective:     Visit Vitals    /88    Pulse 88    Temp 97.5 °F (36.4 °C) (Oral)    Resp 20    Ht 5' 3\" (1.6 m)    Wt 184 lb 4.8 oz (83.6 kg)    SpO2 96%    BMI 32.65 kg/m2     Wt Readings from Last 3 Encounters:   05/30/17 184 lb 4.8 oz (83.6 kg)   01/30/17 185 lb 3.2 oz (84 kg)   06/28/16 181 lb 14.4 oz (82.5 kg)     PHQ over the last two weeks 5/30/2017   Little interest or pleasure in doing things Not at all   Feeling down, depressed or hopeless Not at all   Total Score PHQ 2 0         Physical Exam   Constitutional: She appears well-developed and well-nourished. No distress. HENT:   Right Ear: Tympanic membrane, external ear and ear canal normal.   Left Ear: Tympanic membrane, external ear and ear canal normal.   Mouth/Throat: Oropharynx is clear and moist. No oropharyngeal exudate. Eyes: Conjunctivae are normal. Pupils are equal, round, and reactive to light. Right eye exhibits no discharge. Left eye exhibits no discharge. No scleral icterus. Neck: Neck supple. No thyromegaly present. Cardiovascular: Normal rate, regular rhythm and normal heart sounds. Exam reveals no gallop and no friction rub. No murmur heard. Pulmonary/Chest: Effort normal and breath sounds normal. No respiratory distress. She has no decreased breath sounds. She has no wheezes. She has no rhonchi. She has no rales. Abdominal: Soft. Bowel sounds are normal. She exhibits no distension and no mass. There is no hepatosplenomegaly. There is no tenderness. There is no rigidity, no rebound and no guarding. Musculoskeletal: She exhibits no edema. Lymphadenopathy:     She has no cervical adenopathy. Right: No supraclavicular adenopathy present. Left: No supraclavicular adenopathy present.    Neurological: She is alert. She has normal strength and normal reflexes. No sensory deficit. She exhibits normal muscle tone. Skin: She is not diaphoretic. Psychiatric: She has a normal mood and affect. Her behavior is normal.     Allergies   Allergen Reactions    Pcn [Penicillins] Hives    Lisinopril Cough     Dry cough    Nsaids (Non-Steroidal Anti-Inflammatory Drug) Other (comments)     Abdominal pain     Prior to Admission medications    Medication Sig Start Date End Date Taking? Authorizing Provider   metFORMIN ER (GLUCOPHAGE XR) 500 mg tablet Week 1: 1 tab by mouth daily. Week 2: take 1 tab by mouth twice a day. Week 4: take 2 tab by mouth twice a day. 5/30/17  Yes Eugene Finnegan. Parag Abreu MD   meloxicam (MOBIC) 15 mg tablet Take 15 mg by mouth daily. Yes Historical Provider   atorvastatin (LIPITOR) 20 mg tablet Take 1 Tab by mouth daily. 5/24/16  Yes Loyd Martin MD   omeprazole (PRILOSEC) 10 mg capsule TAKE ONE CAPSULE BY MOUTH ONE TIME DAILY  2/3/16  Yes Loyd Martin MD   losartan (COZAAR) 50 mg tablet TAKE ONE TABLET BY MOUTH ONE TIME DAILY  8/30/15  Yes Loyd Martin MD   cholecalciferol, vitamin D3, (VITAMIN D3) 2,000 unit Tab Take 5,000 Int'l Units by mouth daily. Yes Historical Provider     . 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         Social History Main Topics    Smoking status: Never Smoker    Smokeless tobacco: Never Used    Alcohol use No    Drug use: No    Sexual activity: Yes     Partners: Male     Birth control/ protection: Surgical      Comment: monogamous with  since 1980     Other Topics Concern     Service No    Blood Transfusions No    Caffeine Concern Yes     3 cups tea daily, 2 in AM and 1 in PM    Occupational Exposure No    Hobby Hazards No    Sleep Concern Yes     9-10 hours daily, wakes up at 7:30 and after son leaves for school, takes 1 hour nap    Stress Concern Yes     planning elaborate wedding for daughter    Weight Concern No    Special Diet No    Back Care No    Exercise No     doesn't     Social History Narrative       Past Medical History:   Diagnosis Date    Advanced care planning/counseling discussion 16    Allergic rhinitis, cause unspecified 10/2003    Breast mass, right 10/12/11    Right. Dr. Castro Caruso. benign. .    Carpal tunnel syndrome 2012    bilaterally. Dr. Jessica Denson.  Chest pain 2012    Dr. Fela Grullon    Elevated alkaline phosphatase level 04    125    Essential hypertension, benign 12    Fibrocystic breast changes     Heel spur     Left. Dr. Kasey Kovacs    Herpes zoster 2003    Right chest.    Hyperglycemia 2012    Iron deficiency anemia 10/2003    Menorrhagia     due to endometrial polyps. Dr. Flaquito Wong Other and unspecified hyperlipidemia     Ovarian follicular cyst 73/15/36    bilaterally.  Tennis elbow 2012    Right. Dr. Claritza Gold.  Trigger finger 2012    Left thumb. Right middle. Dr. Claritza Gold.  Unspecified vitamin D deficiency 2009       Past Surgical History:   Procedure Laterality Date    HX DILATION AND CURETTAGE  2006    due to heavy menses, endometrial polyp benign. Dr. Kelly Arteaga Right 10/12/2011    Right. Dr. Castro Caruso. Benign. OB History      Para Term  AB TAB SAB Ectopic Multiple Living    5 4   1     4        Obstetric Comments    Postmenopausal. LMP around 45 yo  Patient's last menstrual period was 12/15/2011.   H/o abnl pap: yes            Family History   Problem Relation Age of Onset    Elevated Lipids Mother     Hypertension Mother     High Cholesterol Mother     Asthma Mother     Arthritis-osteo Mother     Hypertension Father     Heart Disease Father     Heart Attack Father 72    Diabetes Father     Heart Disease Brother 61     pacemaker    Hypertension Brother     Diabetes Brother     Asthma Sister    Angelo Oms Migraines Sister

## 2017-05-30 NOTE — PATIENT INSTRUCTIONS
TODAY, please go to:   CHECK OUT     Please schedule the following appointments at [facility] OUT:  · Lab visit, NOT fasting in 3 months  · Diabetes, weight, HTN follow up with Dr. Iris Valles the week after labs    _____________________     Today's Plan:      Hepatitis C test is negative. Your red blood cell count is normal.   Your white blood cell count is normal.   Your platelet count is normal.   Your kidney function is normal.     Your liver tests are elevated. You have Diabetes. This means your blood sugars are higher than normal on average. The following can help you improve and control your blood sugar. · Food Choices  · Be Mindful of CARBOHYDRATES. Carbohydrates= sugars. This means breads, pasta, rice, chips, desserts, starchy vegetables, etc.  · Decrease how OFTEN you have carbohydrates  · Decrease how MUCH carbohydrates you eat at one time  · Choose carbs that are better for you, like whole grain. · Try to eliminate sugar from your drinks. (Soda, sweet tea, lemonade, juice, gatorade, alcohol, etc). · Let Dr. Iris Valles know if you would like to see Nutrition to discuss more  · Physical Activity  · Move more. · Try to walk 150 minutes per week. · Try to walk 10,000 steps per day  · Consider counting your steps on your smart phone. · Weight Loss  · Moving more and eating better are good for you no matter what. · If you go enough of them you can achieve a healthier weight. This will help your blood sugars. · Medication  · Sometimes you need medicine to help. Metformin can help. You would take this by mouth every day.     _____________________     Your blood pressure is too high. Overtime your blood pressure can hurt your body, making it more likely for you to have heart disease, stroke, kidney disease, etc. It is important to get your blood pressure in better control. Your goal Blood pressure is less than 140/90. The top number is called the systolic blood pressure.    The bottom number is called the diastolic blood pressure. If you check your blood pressure at home, write it down and bring it to your follow up appointments. If you have mychart you can enter them there for Dr. Kai Arredondo to review. The following can help you improve and control your blood pressure. Food Choices  · Learn about the DASH diet  · Be Mindful of SODIUM. · Sodium can hide in lots of foods. · Let Dr. Kai Arredondo know if you would like to see Nutrition to discuss more  Physical Activity  · Move more. · Try to walk 150 minutes per week. · Try to walk 10,000 steps per day  · Consider counting your steps on your smart phone. Weight Loss  · Moving more and eating better are good for you no matter what. · If you go enough of them you can achieve a healthier weight. This will help your blood sugars. Alcohol  · Decrease or stop drinking alcohol. Sleep Apnea  · If you have sleep apnea, make sure you are using your CPAP as advised. Medication  · Often people need medication. Be sure to take as advised. .       _____________________     Review your health maintenance below. Make plans to return and address anything that is due or will be due soon. Health Maintenance Due   Topic Date Due    Stool testing for trace blood  05/28/2010    Cervical Cancer Screening  06/28/2017         _____________________     Are you stressed out? Overwhelmed? In pain? Feeling disconnected? Consider MINDFULNESS. ..  [email or web address]  _____________________     If you need to get your labs done at [facility], visit this site to find a convenient location: [email or web address]           DASH Diet: Care Instructions  Your Care Instructions  The DASH diet is an eating plan that can help lower your blood pressure. DASH stands for Dietary Approaches to Stop Hypertension. Hypertension is high blood pressure.   The DASH diet focuses on eating foods that are high in calcium, potassium, and magnesium. These nutrients can lower blood pressure. The foods that are highest in these nutrients are fruits, vegetables, low-fat dairy products, nuts, seeds, and legumes. But taking calcium, potassium, and magnesium supplements instead of eating foods that are high in those nutrients does not have the same effect. The DASH diet also includes whole grains, fish, and poultry. The DASH diet is one of several lifestyle changes your doctor may recommend to lower your high blood pressure. Your doctor may also want you to decrease the amount of sodium in your diet. Lowering sodium while following the DASH diet can lower blood pressure even further than just the DASH diet alone. Follow-up care is a key part of your treatment and safety. Be sure to make and go to all appointments, and call your doctor if you are having problems. It's also a good idea to know your test results and keep a list of the medicines you take. How can you care for yourself at home? Following the DASH diet  · Eat 4 to 5 servings of fruit each day. A serving is 1 medium-sized piece of fruit, ½ cup chopped or canned fruit, 1/4 cup dried fruit, or 4 ounces (½ cup) of fruit juice. Choose fruit more often than fruit juice. · Eat 4 to 5 servings of vegetables each day. A serving is 1 cup of lettuce or raw leafy vegetables, ½ cup of chopped or cooked vegetables, or 4 ounces (½ cup) of vegetable juice. Choose vegetables more often than vegetable juice. · Get 2 to 3 servings of low-fat and fat-free dairy each day. A serving is 8 ounces of milk, 1 cup of yogurt, or 1 ½ ounces of cheese. · Eat 6 to 8 servings of grains each day. A serving is 1 slice of bread, 1 ounce of dry cereal, or ½ cup of cooked rice, pasta, or cooked cereal. Try to choose whole-grain products as much as possible. · Limit lean meat, poultry, and fish to 2 servings each day. A serving is 3 ounces, about the size of a deck of cards.   · Eat 4 to 5 servings of nuts, seeds, and legumes (cooked dried beans, lentils, and split peas) each week. A serving is 1/3 cup of nuts, 2 tablespoons of seeds, or ½ cup of cooked beans or peas. · Limit fats and oils to 2 to 3 servings each day. A serving is 1 teaspoon of vegetable oil or 2 tablespoons of salad dressing. · Limit sweets and added sugars to 5 servings or less a week. A serving is 1 tablespoon jelly or jam, ½ cup sorbet, or 1 cup of lemonade. · Eat less than 2,300 milligrams (mg) of sodium a day. If you limit your sodium to 1,500 mg a day, you can lower your blood pressure even more. Tips for success  · Start small. Do not try to make dramatic changes to your diet all at once. You might feel that you are missing out on your favorite foods and then be more likely to not follow the plan. Make small changes, and stick with them. Once those changes become habit, add a few more changes. · Try some of the following:  ¨ Make it a goal to eat a fruit or vegetable at every meal and at snacks. This will make it easy to get the recommended amount of fruits and vegetables each day. ¨ Try yogurt topped with fruit and nuts for a snack or healthy dessert. ¨ Add lettuce, tomato, cucumber, and onion to sandwiches. ¨ Combine a ready-made pizza crust with low-fat mozzarella cheese and lots of vegetable toppings. Try using tomatoes, squash, spinach, broccoli, carrots, cauliflower, and onions. ¨ Have a variety of cut-up vegetables with a low-fat dip as an appetizer instead of chips and dip. ¨ Sprinkle sunflower seeds or chopped almonds over salads. Or try adding chopped walnuts or almonds to cooked vegetables. ¨ Try some vegetarian meals using beans and peas. Add garbanzo or kidney beans to salads. Make burritos and tacos with mashed gonzalez beans or black beans. Where can you learn more? Go to http://candice-carina.info/. Enter I084 in the search box to learn more about \"DASH Diet: Care Instructions. \"  Current as of: March 23, 2016  Content Version: 11.2  © 8434-3709 "[employer]. Care instructions adapted under license by [email or web address] (which disclaims liability or warranty for this information). If you have questions about a medical condition or this instruction, always ask your healthcare professional. [address] any warranty or liability for your use of this information. Mediterranean Diet: Care Instructions  Your Care Instructions  The Mediterranean diet features foods eaten in [place], [place], [place], and other countries that border the [facility]. It emphasizes eating a diet rich in fruits, vegetables, nuts, and high-fiber grains, and limits meat, cheese, and sweets. The Mediterranean diet may:  · Prevent heart disease and lower the risk of a heart attack or stroke. · Prevent type 2 diabetes. · Prevent Alzheimer's disease and other dementia. · Prevent depression. · Prevent Parkinson's disease. This diet contains more fat than other heart-healthy diets. But the fats are mainly from nuts, unsaturated oils, such as fish oils, olive oil, and certain nut or seed oils (such as canola, soybean, or flaxseed oil). These types of oils may help protect the heart and blood vessels. Follow-up care is a key part of your treatment and safety. Be sure to make and go to all appointments, and call your doctor if you are having problems. It's also a good idea to know your test results and keep a list of the medicines you take. How can you care for yourself at home? What to eat  · Eat a variety of fruits and vegetables each day, such as grapes, blueberries, tomatoes, broccoli, peppers, figs, olives, spinach, eggplant, beans, lentils, and chickpeas. · Eat a variety of whole-grain foods each day, such as oats, brown rice, and whole wheat bread, pasta, and couscous. · Eat fish at least 2 times a week. Try tuna, salmon, mackerel, lake trout, herring, or sardines.   · Eat moderate amounts of low-fat dairy products, such as milk, cheese, or yogurt. · Eat moderate amounts of poultry and eggs. · Choose healthy (unsaturated) fats, such as nuts, olive oil and certain nut or seed oils like canola, soybean, and flaxseed. · Limit unhealthy (saturated) fats, such as butter, palm oil, and coconut oil. And limit fats found in animal products, such as meat and dairy products made with whole milk. Try to eat red meat only a few times a month in very small amounts. · Limit sweets and desserts to only a few times a week. This includes sugar-sweetened drinks like soda. The Mediterranean diet may also include red wine with your meal1 glass each day for women and up to 2 glasses a day for men. Tips for changing your diet  · Dip bread in a mix of olive oil and fresh herbs instead of using butter. · Add avocado slices to your sandwich instead of montemayor. · Have fish for lunch or dinner instead of red meat. Brush the fish with olive oil, and broil or grill it. · Sprinkle your salad with seeds or nuts instead of cheese. · Cook with olive or canola oil instead of butter or oils that are high in saturated fat. · Switch from 2% milk or whole milk to 1% or fat-free milk. · Dip raw vegetables in a vinaigrette dressing or hummus instead of dips made from mayonnaise or sour cream.  · Have a piece of fruit for dessert instead of a piece of cake. Try baked apples, or have some dried fruit. Part of the Mediterranean diet is being active. Get at least 30 minutes of exercise on most days of the week. Walking is a good choice. You also may want to do other activities, such as running, swimming, cycling, or playing tennis or team sports. Where can you learn more? Go to http://candice-carina.info/. Enter O407 in the search box to learn more about \"Mediterranean Diet: Care Instructions. \"  Current as of: October 21, 2016  Content Version: 11.2  © 1427-4660 [employer], [facility].  Care instructions adapted under license by [employer] (which disclaims liability or warranty for this information). If you have questions about a medical condition or this instruction, always ask your healthcare professional. [address] any warranty or liability for your use of this information. Hyperlipidemia: After Your Visit  Your Care Instructions  Hyperlipidemia is too much fat in your blood. The body has several kinds of fat, including cholesterol and triglycerides. Your body needs fat for many things, such as making new cells. But too much fat in your blood increases your chances of having a heart attack or stroke. You may be able to lower your cholesterol and triglycerides with a heart-healthy diet, exercise, and if needed, medicine. Your doctor may want you to try lifestyle changes first to see whether they lower the fat in your blood. You may need to take medicine if lifestyle changes do not lower the fat in your blood enough. Follow-up care is a key part of your treatment and safety. Be sure to make and go to all appointments, and call your doctor if you are having problems. Its also a good idea to know your test results and keep a list of the medicines you take. How can you care for yourself at home? Take your medicines  · Take your medicines exactly as prescribed. Call your doctor if you think you are having a problem with your medicine. · If you take medicine to lower your cholesterol, go to follow-up visits. You will need to have blood tests. · Do not take large doses of niacin, which is a B vitamin, while taking medicine called statins. It may increase the chance of muscle pain and liver problems. · Talk to your doctor about avoiding grapefruit juice if you are taking statins. Grapefruit juice can raise the level of this medicine in your blood. This could increase side effects.   Eat more fruits, vegetables, and fiber  · Fruits and vegetables have lots of nutrients that help protect against heart disease, and they have littleif anyfat. Try to eat at least five servings a day. Dark green, deep orange, or yellow fruits and vegetables are healthy choices. · Keep carrots, celery, and other veggies handy for snacks. Buy fruit that is in season and store it where you can see it so that you will be tempted to eat it. Cook dishes that have a lot of veggies in them, such as stir-fries and soups. · Foods high in fiber may reduce your cholesterol and provide important vitamins and minerals. High-fiber foods include whole-grain cereals and breads, oatmeal, beans, brown rice, citrus fruits, and apples. · Buy whole-grain breads and cereals instead of white bread and pastries. Limit saturated fat  · Read food labels and try to avoid saturated fat and trans fat. They increase your risk of heart disease. · Use olive or canola oil when you cook. Try cholesterol-lowering spreads, such as Benecol or Take Control. · Bake, broil, grill, or steam foods instead of frying them. · Limit the amount of high-fat meats you eat, including hot dogs and sausages. Cut out all visible fat when you prepare meat. · Eat fish, skinless poultry, and soy products such as tofu instead of high-fat meats. Soybeans may be especially good for your heart. Eat at least two servings of fish a week. Certain fish, such as salmon, contain omega-3 fatty acids, which may help reduce your risk of heart attack. · Choose low-fat or fat-free milk and dairy products. Get exercise, limit alcohol, and quit smoking  · Get more exercise. Work with your doctor to set up an exercise program. Even if you can do only a small amount, exercise will help you get stronger, have more energy, and manage your weight and your stress. Walking is an easy way to get exercise. Gradually increase the amount you walk every day. Aim for at least 30 minutes on most days of the week.  You also may want to swim, bike, or do other activities. · Limit alcohol to no more than 2 drinks a day for men and 1 drink a day for women. · Do not smoke. If you need help quitting, talk to your doctor about stop-smoking programs and medicines. These can increase your chances of quitting for good. When should you call for help? Call 911 anytime you think you may need emergency care. For example, call if:  · You have symptoms of a heart attack. These may include:  ¨ Chest pain or pressure, or a strange feeling in the chest.  ¨ Sweating. ¨ Shortness of breath. ¨ Nausea or vomiting. ¨ Pain, pressure, or a strange feeling in the back, neck, jaw, or upper belly or in one or both shoulders or arms. ¨ Lightheadedness or sudden weakness. ¨ A fast or irregular heartbeat. After you call 911, the  may tell you to chew 1 adult-strength or 2 to 4 low-dose aspirin. Wait for an ambulance. Do not try to drive yourself. · You have signs of a stroke. These may include:  ¨ Sudden numbness, paralysis, or weakness in your face, arm, or leg, especially on only one side of your body. ¨ New problems with walking or balance. ¨ Sudden vision changes. ¨ Drooling or slurred speech. ¨ New problems speaking or understanding simple statements, or feeling confused. ¨ A sudden, severe headache that is different from past headaches. · You passed out (lost consciousness). Call your doctor now or seek immediate medical care if:  · You have muscle pain or weakness. Watch closely for changes in your health, and be sure to contact your doctor if:  · You are very tired. · You have an upset stomach, gas, constipation, or belly pain or cramps. Where can you learn more? Go to GreenIQ.be  Enter C406 in the search box to learn more about \"Hyperlipidemia: After Your Visit. \"   © 4678-9386 [employer]. Care instructions adapted under license by [employer] (which disclaims liability or warranty for this information).  This care instruction is for use with your licensed healthcare professional. If you have questions about a medical condition or this instruction, always ask your healthcare professional. [address] any warranty or liability for your use of this information. Content Version: 8.4.808740; Last Revised: October 13, 2011              Nutrition Tips for Diabetes: After Your Visit  Your Care Instructions  A healthy diet is important to manage diabetes. It helps you lose weight (if you need to) and keep it off. It gives you the nutrition and energy your body needs and helps prevent heart disease. But a diet for diabetes does not mean that you have to eat special foods. You can eat what your family eats, including occasional sweets and other favorites. But you do have to pay attention to how often you eat and how much you eat of certain foods. The right plan for you will give you meals that help you keep your blood sugar at healthy levels. Try to eat a variety of foods and to spread carbohydrate throughout the day. Carbohydrate raises blood sugar higher and more quickly than any other nutrient does. Carbohydrate is found in sugar, breads and cereals, fruit, starchy vegetables such as potatoes and corn, and milk and yogurt. You may want to work with a dietitian or diabetes educator to help you plan meals and snacks. A dietitian or diabetes educator also can help you lose weight if that is one of your goals. The following tips can help you enjoy your meals and stay healthy. Follow-up care is a key part of your treatment and safety. Be sure to make and go to all appointments, and call your doctor if you are having problems. Its also a good idea to know your test results and keep a list of the medicines you take. How can you care for yourself at home? · Learn which foods have carbohydrate and how much carbohydrate to eat.  A dietitian or diabetes educator can help you learn to keep track of how much carbohydrate you eat. · Spread carbohydrate throughout the day. Eat some carbohydrate at all meals, but do not eat too much at any one time. · Plan meals to include food from all the food groups. These are the food groups and some example portion sizes:  ¨ Grains: 1 slice of bread (1 ounce), ½ cup of cooked cereal, and 1/3 cup of cooked pasta or rice. These have about 15 grams of carbohydrate in a serving. Choose whole grains such as whole wheat bread or crackers, oatmeal, and brown rice more often than refined grains. ¨ Fruit: 1 small fresh fruit, such as an apple or orange; ½ of a banana; ½ cup of chopped, cooked, or canned fruit; ½ cup of fruit juice; 1 cup of melon or raspberries; and 2 tablespoons of dried fruit. These have about 15 grams of carbohydrate in a serving. ¨ Dairy: 1 cup of nonfat or low-fat milk and 2/3 cup of plain yogurt. These have about 15 grams of carbohydrate in a serving. ¨ Protein foods: Beef, chicken, turkey, fish, eggs, tofu, cheese, cottage cheese, and peanut butter. A serving size of meat is 3 ounces, which is about the size of a deck of cards. Examples of meat substitute serving sizes (equal to 1 ounce of meat) are 1/4 cup of cottage cheese, 1 egg, 1 tablespoon of peanut butter, and ½ cup of tofu. These have very little or no carbohydrate per serving. ¨ Vegetables: Starchy vegetables such as ½ cup of cooked dried beans, peas, potatoes, or corn have about 15 grams of carbohydrate. Nonstarchy vegetables have very little carbohydrate, such as 1 cup of raw leafy vegetables (such as spinach), ½ cup of other vegetables (cooked or chopped), and 3/4 cup of vegetable juice. · Use the plate format to plan meals. It is a good, quick way to make sure that you have a balanced meal. It also helps you spread carbohydrate throughout the day. You divide your plate by types of foods.  Put vegetables on half the plate, meat or meat substitutes on one-quarter of the plate, and a grain or starchy vegetable (such as brown rice or a potato) in the final quarter of the plate. To this you can add a small piece of fruit and 1 cup of milk or yogurt, depending on how much carbohydrate you are supposed to eat at a meal.  · Talk to your dietitian or diabetes educator about ways to add limited amounts of sweets into your meal plan. You can eat these foods now and then, as long as you include the amount of carbohydrate they have in your daily carbohydrate allowance. · If you drink alcohol, limit it to no more than 1 drink a day for women and 2 drinks a day for men. If you are pregnant, no amount of alcohol is known to be safe. · Protein, fat, and fiber do not raise blood sugar as much as carbohydrate does. If you eat a lot of these nutrients in a meal, your blood sugar will rise more slowly than it would otherwise. · Limit saturated fats, such as those from meat and dairy products. Try to replace it with monounsaturated fat, such as olive oil. This is a healthier choice because people who have diabetes are at higher-than-average risk of heart disease. But use a modest amount of olive oil. A tablespoon of olive oil has 14 grams of fat and 120 calories. · Exercise lowers blood sugar. If you take insulin by shots or pump, you can use less than you would if you were not exercising. Keep in mind that timing matters. If you exercise within 1 hour after a meal, your body may need less insulin for that meal than it would if you exercised 3 hours after the meal. Test your blood sugar to find out how exercise affects your need for insulin. · Exercise on most days of the week. Aim for at least 30 minutes. Exercise helps you stay at a healthy weight and helps your body use insulin. Walking is an easy way to get exercise. Gradually increase the amount you walk every day. You also may want to swim, bike, or do other activities. When you eat out  · Learn to estimate the serving sizes of foods that have carbohydrate.  If you measure food at home, it will be easier to estimate the amount in a serving of restaurant food. · If the meal you order has too much carbohydrate (such as potatoes, corn, or baked beans), ask to have a low-carbohydrate food instead. Ask for a salad or green vegetables. · If you use insulin, check your blood sugar before and after eating out to help you plan how much to eat in the future. · If you eat more carbohydrate at a meal than you had planned, take a walk or do other exercise. This will help lower your blood sugar. Where can you learn more? Go to CHARGED.fm.be  Enter V791 in the search box to learn more about \"Nutrition Tips for Diabetes: After Your Visit. \"   © 5848-7286 [employer]wise[employer]. Care instructions adapted under license by [employer] (which disclaims liability or warranty for this information). This care instruction is for use with your licensed healthcare professional. If you have questions about a medical condition or this instruction, always ask your healthcare professional. [place] [address] any warranty or liability for your use of this information.   Content Version: 93.9.849957; Current as of: June 4, 2014

## 2017-05-30 NOTE — PROGRESS NOTES
Chief Complaint   Patient presents with    Follow-up     6 month    Plantar Fasciitis     1. Have you been to the ER, urgent care clinic since your last visit? Hospitalized since your last visit? No    2. Have you seen or consulted any other health care providers outside of the [address] [place] [address] since your last visit? Include any pap smears or colon screening.  No       Health Maintenance Due   Topic Date Due    FOBT Q 1 YEAR AGE 50-75  05/28/2010    PAP AKA CERVICAL CYTOLOGY  06/28/2017

## 2017-06-28 RX ORDER — OMEPRAZOLE 10 MG/1
CAPSULE, DELAYED RELEASE ORAL
Qty: 90 CAP | Refills: 3 | Status: SHIPPED | OUTPATIENT
Start: 2017-06-28 | End: 2018-07-23 | Stop reason: SDUPTHER

## 2017-06-28 RX ORDER — LOSARTAN POTASSIUM 50 MG/1
TABLET ORAL
Qty: 90 TAB | Refills: 3 | Status: SHIPPED | OUTPATIENT
Start: 2017-06-28 | End: 2018-07-31 | Stop reason: SDUPTHER

## 2017-06-28 NOTE — TELEPHONE ENCOUNTER
Future Appointments  Date Time Provider Remi Moody   8/24/2017 11:00 AM LAB [facility] BRCOLBY QURESHI   8/31/2017 3:40 PM Leobardo Patel.  Tash Bautista MD [address] [facility]

## 2017-08-21 DIAGNOSIS — E78.5 DYSLIPIDEMIA: ICD-10-CM

## 2017-08-21 RX ORDER — ATORVASTATIN CALCIUM 20 MG/1
20 TABLET, FILM COATED ORAL DAILY
Qty: 30 TAB | Refills: 11 | Status: SHIPPED | OUTPATIENT
Start: 2017-08-21 | End: 2018-08-20 | Stop reason: SDUPTHER

## 2017-08-28 ENCOUNTER — APPOINTMENT (OUTPATIENT)
Dept: FAMILY MEDICINE CLINIC | Age: 57
End: 2017-08-28

## 2017-08-29 LAB
25(OH)D3+25(OH)D2 SERPL-MCNC: 45.1 NG/ML (ref 30–100)
ALBUMIN SERPL-MCNC: 4.6 G/DL (ref 3.5–5.5)
ALBUMIN/GLOB SERPL: 2 {RATIO} (ref 1.2–2.2)
ALP SERPL-CCNC: 138 IU/L (ref 39–117)
ALT SERPL-CCNC: 77 IU/L (ref 0–32)
AST SERPL-CCNC: 43 IU/L (ref 0–40)
BILIRUB SERPL-MCNC: 0.5 MG/DL (ref 0–1.2)
BUN SERPL-MCNC: 15 MG/DL (ref 6–24)
BUN/CREAT SERPL: 21 (ref 9–23)
CALCIUM SERPL-MCNC: 9.4 MG/DL (ref 8.7–10.2)
CHLORIDE SERPL-SCNC: 100 MMOL/L (ref 96–106)
CHOLEST SERPL-MCNC: 180 MG/DL (ref 100–199)
CO2 SERPL-SCNC: 27 MMOL/L (ref 18–29)
CREAT SERPL-MCNC: 0.71 MG/DL (ref 0.57–1)
EST. AVERAGE GLUCOSE BLD GHB EST-MCNC: 166 MG/DL
GLOBULIN SER CALC-MCNC: 2.3 G/DL (ref 1.5–4.5)
GLUCOSE SERPL-MCNC: 143 MG/DL (ref 65–99)
HBA1C MFR BLD: 7.4 % (ref 4.8–5.6)
HDLC SERPL-MCNC: 46 MG/DL
INTERPRETATION, 910389: NORMAL
LDLC SERPL CALC-MCNC: 95 MG/DL (ref 0–99)
Lab: NORMAL
POTASSIUM SERPL-SCNC: 4.7 MMOL/L (ref 3.5–5.2)
PROT SERPL-MCNC: 6.9 G/DL (ref 6–8.5)
SODIUM SERPL-SCNC: 141 MMOL/L (ref 134–144)
TRIGL SERPL-MCNC: 193 MG/DL (ref 0–149)
VLDLC SERPL CALC-MCNC: 39 MG/DL (ref 5–40)

## 2017-08-31 ENCOUNTER — OFFICE VISIT (OUTPATIENT)
Dept: FAMILY MEDICINE CLINIC | Age: 57
End: 2017-08-31

## 2017-08-31 VITALS
OXYGEN SATURATION: 96 % | RESPIRATION RATE: 18 BRPM | SYSTOLIC BLOOD PRESSURE: 133 MMHG | DIASTOLIC BLOOD PRESSURE: 66 MMHG | HEIGHT: 63 IN | WEIGHT: 181 LBS | BODY MASS INDEX: 32.07 KG/M2 | HEART RATE: 77 BPM | TEMPERATURE: 97.7 F

## 2017-08-31 DIAGNOSIS — R79.89 ELEVATED LFTS: ICD-10-CM

## 2017-08-31 DIAGNOSIS — E11.9 TYPE 2 DIABETES MELLITUS WITHOUT COMPLICATION, WITHOUT LONG-TERM CURRENT USE OF INSULIN (HCC): Primary | ICD-10-CM

## 2017-08-31 DIAGNOSIS — I10 ESSENTIAL HYPERTENSION: ICD-10-CM

## 2017-08-31 DIAGNOSIS — E55.9 VITAMIN D DEFICIENCY: ICD-10-CM

## 2017-08-31 DIAGNOSIS — Z23 ENCOUNTER FOR IMMUNIZATION: ICD-10-CM

## 2017-08-31 DIAGNOSIS — L81.9 PIGMENTED SKIN LESION: ICD-10-CM

## 2017-08-31 DIAGNOSIS — E78.1 HIGH TRIGLYCERIDES: ICD-10-CM

## 2017-08-31 NOTE — PATIENT INSTRUCTIONS
TODAY, please go to:   CHECK OUT       Please schedule the following appointments at [address] [place] [address]:  · Diabetes follow up with Dr. Yaya Masters in Dec 2017  · Lab visit, NOT fasting within 1-2 weeks     Today's Plan:  - Flu season is coming! Remember to get your flu vaccine!   - see derm  - have ultrasound    The following can help you improve your weight. Food Choices  · Be Mindful of CARBOHYDRATES. Carbohydrates= sugars. This means breads, pasta, rice, chips, desserts, starchy vegetables, etc.  · Decrease how OFTEN you have carbohydrates  · Decrease how MUCH carbohydrates you eat at one time  · Choose carbs that are better for you, like whole grain. · Try to eliminate sugar from your drinks. (Soda, sweet tea, lemonade, juice, gatorade, alcohol, etc). · Eat more fruits and vegetables  · Eat protein and healthy fats  · Eat regularly  · Count calories if needed  · Let Dr. Yaya Masters know if you would like to see Nutrition to discuss more  Physical Activity  · Move more. · Try to walk 150 minutes per week. · Try to walk 10,000 steps per day  · Consider counting your steps on your smart phone. Medication  · For some people medication is helpful. Let Dr. Yaya Masters know if you would like to discuss this. If prescribed medication, please take as advised. Eye Exam  Our records show that you are due for an annual eye exam. If you have diabetes, make sure your eye doctor knows so that they take a close look at the back of your eye (retina). If you have not had this done within the year, please schedule to see your eye doctor. After the visit, have your doctor fax us documentation to this office. [address] Masters -[phone]. If you have already had this done, let us know so we can request it or have your doctor fax us documentation to this office. [address] Masters -[phone].

## 2017-08-31 NOTE — MR AVS SNAPSHOT
Visit Information Date & Time Provider Department Dept. Phone Encounter #  
 8/31/2017  3:40 PM Wendi Nelson. David Argueta MD [facility] [phone] [id number] Upcoming Health Maintenance Date Due  
 FOOT EXAM Q1 5/28/1970 MICROALBUMIN Q1 5/28/1970 EYE EXAM RETINAL OR DILATED Q1 5/28/1970 Pneumococcal 19-64 Medium Risk (1 of 1 - PPSV23) 5/28/1979 FOBT Q 1 YEAR AGE 50-75 5/28/2010 PAP AKA CERVICAL CYTOLOGY 6/28/2017 INFLUENZA AGE 9 TO ADULT 8/1/2017 HEMOGLOBIN A1C Q6M 11/23/2017 BREAST CANCER SCRN MAMMOGRAM 5/15/2018 LIPID PANEL Q1 5/23/2018 DTaP/Tdap/Td series (2 - Td) 4/15/2019 Allergies as of 8/31/2017  Review Complete On: 8/31/2017 By: [facility], LPN Severity Noted Reaction Type Reactions Pcn [Penicillins] High 04/16/2010    Hives Lisinopril  01/23/2013   Side Effect Cough Dry cough Nsaids (Non-steroidal Anti-inflammatory Drug)  12/03/2012    Other (comments) Abdominal pain Current Immunizations  Reviewed on 1/30/2017 Name Date Influenza Vaccine 10/1/2016, 10/11/2013 Influenza Vaccine (Quad) PF 10/7/2014 Influenza Vaccine Split 10/15/2012 TDAP Vaccine 4/15/2009 Not reviewed this visit You Were Diagnosed With   
  
 Codes Comments Type 2 diabetes mellitus without complication, without long-term current use of insulin (HCC)    -  Primary ICD-10-CM: E11.9 ICD-9-CM: 250.00 High triglycerides     ICD-10-CM: E78.1 ICD-9-CM: 272.1 Essential hypertension     ICD-10-CM: I10 
ICD-9-CM: 401.9 Vitamin D deficiency     ICD-10-CM: E55.9 ICD-9-CM: 268.9 Pigmented skin lesion     ICD-10-CM: L81.9 ICD-9-CM: 709.00 Elevated LFTs     ICD-10-CM: R94.5 ICD-9-CM: 790.6 Vitals BP Pulse Temp Resp Height(growth percentile) Weight(growth percentile) 133/66 (BP 1 Location: Left arm, BP Patient Position: Sitting) 77 97.7 °F (36.5 °C) (Oral) 18 5' 3\" (1.6 m) 181 lb (82.1 kg) LMP SpO2 BMI OB Status Smoking Status 12/15/2011 96% 32.06 kg/m2 Postmenopausal Never Smoker BMI and BSA Data Body Mass Index Body Surface Area 32.06 kg/m 2 1.91 m 2 Preferred Pharmacy Pharmacy Name Phone [facility] [phone] Your Updated Medication List  
  
   
This list is accurate as of: 8/31/17  4:53 PM.  Always use your most recent med list.  
  
  
  
  
 atorvastatin 20 mg tablet Commonly known as:  LIPITOR Take 1 Tab by mouth daily. losartan 50 mg tablet Commonly known as:  COZAAR  
TAKE ONE TABLET BY MOUTH ONE TIME DAILY  
  
 meloxicam 15 mg tablet Commonly known as:  MOBIC Take 15 mg by mouth daily. metFORMIN  mg tablet Commonly known as:  GLUCOPHAGE XR Week 1: 1 tab by mouth daily. Week 2: take 1 tab by mouth twice a day. Week 4: take 2 tab by mouth twice a day. omeprazole 10 mg capsule Commonly known as:  PRILOSEC  
TAKE ONE CAPSULE BY MOUTH ONE TIME DAILY  
  
 VITAMIN D3 2,000 unit Tab Generic drug:  cholecalciferol (vitamin D3) Take 5,000 Int'l Units by mouth daily. We Performed the Following REFERRAL TO DERMATOLOGY [REF19 Custom] Comments:  
 Please evaluate patient for pigmented lesion, growing in size To-Do List   
 08/31/2017 Imaging:  US ABD LTD Referral Information Referral ID Referred By Referred To  
  
 [id number] [facility] [place] [address] [address] Phone: [phone] Fax: [phone] Visits Status Start Date End Date 1 New Request 8/31/17 8/31/18 If your referral has a status of pending review or denied, additional information will be sent to support the outcome of this decision. Patient Instructions TODAY, please go to:  CHECK OUT  
 
 
 Please schedule the following appointments at [address] [place] [address]: 
· Diabetes follow up with Dr. Kamila Segura in Dec 2017 · Lab visit, NOT fasting within 1-2 weeks Today's Plan: - Flu season is coming! Remember to get your flu vaccine!  
- see derm 
- have ultrasound The following can help you improve your weight. Food Choices · Be Mindful of CARBOHYDRATES. Carbohydrates= sugars. This means breads, pasta, rice, chips, desserts, starchy vegetables, etc. 
· Decrease how OFTEN you have carbohydrates · Decrease how MUCH carbohydrates you eat at one time · Choose carbs that are better for you, like whole grain. · Try to eliminate sugar from your drinks. (Soda, sweet tea, lemonade, juice, gatorade, alcohol, etc). · Eat more fruits and vegetables · Eat protein and healthy fats · Eat regularly · Count calories if needed · Let Dr. Kamila Segura know if you would like to see Nutrition to discuss more Physical Activity · Move more. · Try to walk 150 minutes per week. · Try to walk 10,000 steps per day · Consider counting your steps on your smart phone. Medication · For some people medication is helpful. Let Dr. Kamila Segura know if you would like to discuss this. If prescribed medication, please take as advised. Eye Exam 
Our records show that you are due for an annual eye exam. If you have diabetes, make sure your eye doctor knows so that they take a close look at the back of your eye (retina). If you have not had this done within the year, please schedule to see your eye doctor. After the visit, have your doctor fax us documentation to this office. [address] [facility] Seg[facility] -[phone]. If you have already had this done, let us know so we can request it or have your doctor fax us documentation to this office. [address] [facility] Phong Segura -[phone]. Introducing [facility]! Dear Freeman Epley: Thank you for requesting a [employer] account.   Our records indicate that you have previously registered for a m[employer] account but its currently inactive. Please call our mCASH support line at [phone]. Additional Information If you have questions, please visit the Frequently Asked Questions section of the m[employer]ASH website at [email or web address] Remember, mCASH is NOT to be used for urgent needs. For medical emergencies, dial 911. Now available from your iPhone and Android! Please provide this summary of care documentation to your next provider. Your primary care clinician is listed as Davion Anders. If you have any questions after today's visit, please call [phone].

## 2017-08-31 NOTE — PROGRESS NOTES
.. [address] [place] [address] Visit   08/31/2017  Patient ID: Nadir Bowen is a 62 y.o. female. Assessment/Plan:    Diagnoses and all orders for this visit:    1. Type 2 diabetes mellitus without complication, without long-term current use of insulin (HCC)  A1C increased. Trial metformin again starting at just 1/2 tab daily. 2. High triglycerides  Improved, but could be better. Continue statin. 3. Essential hypertension  At goal    4. Vitamin D deficiency    5. Pigmented skin lesion  Increased in size. Urgent referral to derm   -     REFERRAL TO DERMATOLOGY    6. Elevated LFTs  eval with labs and [address]; Future    7. Encounter for immunization  -     DC IMMUNIZ ADMIN,1 SINGLE/COMB VAC/TOXOID  -     Pneumococcal polysaccharide vaccine, 23-valent, adult or immunosuppressed pt dose    Referred to [facility] program    Counselled pt on Patient health concerns and plans. Patient was offered a choice/choices in the treatment plan today. Reviewed return precautions as appropriate. Patient expresses understanding of the plan and agrees with recommendations. See patient instructions for more. Next visit: POC A1C    Patient Instructions   TODAY, please go to:   CHECK OUT       Please schedule the following appointments at [facility] OUT:  · [facility] with  [facility] in Dec 2017  · Lab visit, NOT fasting within 1-2 weeks     Today's Plan:  - Flu season is coming! Remember to get your flu vaccine!   - see derm  - have ultrasound    The following can help you improve your weight. Food Choices  · Be Mindful of CARBOHYDRATES. Carbohydrates= sugars. This means breads, pasta, rice, chips, desserts, starchy vegetables, etc.  · Decrease how OFTEN you have carbohydrates  · Decrease how MUCH carbohydrates you eat at one time  · Choose carbs that are better for you, like whole grain. · Try to eliminate sugar from your drinks. (Soda, sweet tea, lemonade, juice, gatorade, alcohol, etc).   · Eat more fruits and vegetables  · Eat protein and healthy fats  · Eat regularly  · Count calories if needed  · Let Dr. Yecenia Quach know if you would like to see Nutrition to discuss more  Physical Activity  · Move more. · Try to walk 150 minutes per week. · Try to walk 10,000 steps per day  · Consider counting your steps on your smart phone. Medication  · For some people medication is helpful. Let Dr. Yecenia Quach know if you would like to discuss this. If prescribed medication, please take as advised. Eye Exam  Our records show that you are due for an annual eye exam. If you have diabetes, make sure your eye doctor knows so that they take a close look at the back of your eye (retina). If you have not had this done within the year, please schedule to see your eye doctor. After the visit, have your doctor fax us documentation to this office. [address] Quach -[phone]. If you have already had this done, let us know so we can request it or have your doctor fax us documentation to this office. [address] Quach -[phone]. Subjective:   HPI:  Jenna Man is a 62 y.o. female being seen for:   Chief Complaint   Patient presents with    Diabetes     follow up     Hypertension     follow up    Weight Management    Immunization/Injection     PPSV-23      · Labs reviewed in detail  · 10 year ascvd 7.0%  · Lifetime ascvd risk 50%  · Took metformin for a few days and stomach was very upset with even one day. · 4X 5 mm today, right dorsal hand   · Doing exercises for feet. No longer on mobic. Wears boot at night. · Food: could be better.       Screening and Prevention Due:  Health Maintenance Due   Topic Date Due    FOOT EXAM Q1  05/28/1970    MICROALBUMIN Q1  05/28/1970    EYE EXAM RETINAL OR DILATED Q1  05/28/1970    Pneumococcal 19-64 Medium Risk (1 of 1 - PPSV23) 05/28/1979    FOBT Q 1 YEAR AGE 50-75  05/28/2010    PAP AKA CERVICAL CYTOLOGY  06/28/2017    INFLUENZA AGE 9 TO ADULT  08/01/2017         Review of Systems  Otherwise as noted in HPI  ? Objective:     Visit Vitals    /66 (BP 1 Location: Left arm, BP Patient Position: Sitting)    Pulse 77    Temp 97.7 °F (36.5 °C) (Oral)    Resp 18    Ht 5' 3\" (1.6 m)    Wt 181 lb (82.1 kg)    SpO2 96%    BMI 32.06 kg/m2     Wt Readings from Last 3 Encounters:   08/31/17 181 lb (82.1 kg)   05/30/17 184 lb 4.8 oz (83.6 kg)   01/30/17 185 lb 3.2 oz (84 kg)     BP Readings from Last 3 Encounters:   08/31/17 133/66   05/30/17 140/88   01/30/17 140/80     PHQ over the last two weeks 8/31/2017   Little interest or pleasure in doing things Not at all   Feeling down, depressed or hopeless Not at all   Total Score PHQ 2 0         Physical Exam   Constitutional: She appears well-developed and well-nourished. No distress. Pulmonary/Chest: Effort normal. No respiratory distress. Neurological: She is alert. Skin:   Pigmented lesion on right dorsal hand 4X5mm    Psychiatric: She has a normal mood and affect. Her behavior is normal.       Allergies   Allergen Reactions    Pcn [Penicillins] Hives    Lisinopril Cough     Dry cough    Nsaids (Non-Steroidal Anti-Inflammatory Drug) Other (comments)     Abdominal pain     Prior to Admission medications    Medication Sig Start Date End Date Taking? Authorizing Provider   atorvastatin (LIPITOR) 20 mg tablet Take 1 Tab by mouth daily. 8/21/17  Yes Belenda Comment. Rosibel Jimenez MD   omeprazole (PRILOSEC) 10 mg capsule TAKE ONE CAPSULE BY MOUTH ONE TIME DAILY 6/28/17  Yes Belenda Comment. Rosibel Jimenez MD   losartan (COZAAR) 50 mg tablet TAKE ONE TABLET BY MOUTH ONE TIME DAILY 6/28/17  Yes Belenda Comment. Rosibel Jimenez MD   cholecalciferol, vitamin D3, (VITAMIN D3) 2,000 unit Tab Take 5,000 Int'l Units by mouth daily. Yes Historical Provider   metFORMIN ER (GLUCOPHAGE XR) 500 mg tablet Week 1: 1 tab by mouth daily. Week 2: take 1 tab by mouth twice a day. Week 4: take 2 tab by mouth twice a day. 5/30/17   Belenda Comment. David Argueta MD   meloxicam (MOBIC) 15 mg tablet Take 15 mg by mouth daily.     Historical Provider     Patient Active Problem List   Diagnosis Code    Vitamin D deficiency E55.9    Heel spur M77.30    Iron deficiency anemia D50.9    Fibrocystic breast changes N60.19    Other and unspecified hyperlipidemia E78.5    Back pain M54.9    Leg pain, left M79.605    Hyperglycemia R73.9    Hypertension I10    Type 2 diabetes mellitus without complication, without long-term current use of insulin (HCC) E11.9    High triglycerides E78.1    Pigmented skin lesion L81.9    Elevated LFTs R94.5

## 2017-08-31 NOTE — PROGRESS NOTES
Chief Complaint   Patient presents with    Diabetes     follow up     Hypertension     follow up    Weight Management     1. Have you been to the ER, urgent care clinic since your last visit? Hospitalized since your last visit? No     2. Have you seen or consulted any other health care providers outside of the [address] [place] [address] since your last visit? Include any pap smears or colon screening. No     The patient was counseled on the dangers of tobacco use, and was advised never to start. Reviewed strategies to maximize success, including never to start. Health Maintenance Due   Topic Date Due    FOOT EXAM Q1 Discussed with patient today and advised to follow up.    05/28/1970    MICROALBUMIN Q1 Discussed with patient today and advised to follow up.    05/28/1970    EYE EXAM RETINAL OR DILATED Q1 Discussed with patient today and advised to follow up.    05/28/1970    Pneumococcal 19-64 Medium Risk (1 of 1 - PPSV23) Discussed with patient today and advised to follow up.    05/28/1979    FOBT Q 1 YEAR AGE 50-75 Discussed with patient today and advised to follow up.    05/28/2010    PAP AKA CERVICAL CYTOLOGY Discussed with patient today and advised to follow up.    06/28/2017    INFLUENZA AGE 9 TO ADULT Discussed with patient today and advised to follow up.    08/01/2017            Diabetic foot exam performed by Nieves Townsend LPN     Measurement  Response Nurse Comment Physician Comment   Monofilament  R - normal sensation with micro filament  L - normal sensation with micro filament     Pulse DP R - present  L - present     Pulse TP R - present  L - present     Structural deformity R - None  L - None     Skin Integrity / Deformity R - None  L - None        Reviewed by: Nieves Townsend LPN               ACP is not on file, advised to return.

## 2017-09-05 ENCOUNTER — TELEPHONE (OUTPATIENT)
Dept: FAMILY MEDICINE CLINIC | Age: 57
End: 2017-09-05

## 2017-09-05 NOTE — TELEPHONE ENCOUNTER
[facility] to schedule urgent appointment for the patient. Appointment scheduled for September 7th at 4:00 pm at the [facility] location, patient will be contacted and given this information.

## 2017-09-05 NOTE — TELEPHONE ENCOUNTER
Contacted patient to inform her of appointment, appointment scheduled for September 7th @ 4:00 pm at the [facility] location. Patient verbalized understanding.

## 2017-09-07 ENCOUNTER — HOSPITAL ENCOUNTER (OUTPATIENT)
Dept: ULTRASOUND IMAGING | Age: 57
Discharge: HOME | End: 2017-09-07
Payer: COMMERCIAL

## 2017-09-07 DIAGNOSIS — R79.89 ELEVATED LFTS: ICD-10-CM

## 2017-09-07 PROCEDURE — 76705 ECHO EXAM OF ABDOMEN: CPT

## 2017-09-07 NOTE — LETTER
1/17/2018 9:41 AM 
 
Ms. Allison Velazquez 
[address] [facility] [place] [address] Dear Richy Kline: 
 
Please find your most recent results below. Resulted Orders US ABD LTD Narrative EXAM:  US ABD LTD INDICATION:   Elevated LFTs 
 
COMPARISON: None. TECHNIQUE:  
Real-time sonography of the abdomen was performed with multiple static images of 
the liver, gallbladder, head of pancreas and right kidney. This was a limited 
right upper quadrant ultrasound. FINDINGS: 
The liver is of increased echogenicity. This is consistent with fatty 
infiltration. There is no mass or other focal abnormality. The portal vein is of 
normal size with normal directional blood flow and velocity. The gallbladder is normal and no stones are identified. There is no wall 
thickening or fluid around the gallbladder. There is no biliary duct dilatation 
and the common duct measures 4 mm in diameter. The head of the pancreas images normally. The right kidney is unremarkable. Impression IMPRESSION: 
1. Increased echogenicity of liver consistent with hepatic steatosis. RECOMMENDATIONS: 
 
Liver ultrasound shows fat in the liver. This can cause your liver labs to be elevated. Losing weight, as you have been planning, can improve this. I will see you as scheduled. 1/26/2018  8:40 AM    LAB [facility]                   [facility]           [facility] SCHED  
2/1/2018   9:40 AM   Mali Valdovinos MD       [facility]         [address] [place] [address] Please call me if you have any questions: [phone] Sincerely, 
 
 
[facility] 1

## 2017-10-27 DIAGNOSIS — Z12.39 BREAST CANCER SCREENING: ICD-10-CM

## 2018-01-05 NOTE — PROGRESS NOTES
Letter. Liver ultrasound shows fat in the liver. This can cause your liver labs to be elevated. Losing weight, as you have been planning, can improve this. I will see you as scheduled. 1/26/2018  8:40 AM    LAB NICK QURESHI  2/1/2018   9:40 AM    Maizn Mckee.  Jamar santana MD       [address] [place] [address]

## 2018-01-26 ENCOUNTER — LAB ONLY (OUTPATIENT)
Dept: FAMILY MEDICINE CLINIC | Age: 58
End: 2018-01-26

## 2018-01-26 DIAGNOSIS — I10 ESSENTIAL HYPERTENSION: ICD-10-CM

## 2018-01-26 DIAGNOSIS — E11.9 TYPE 2 DIABETES MELLITUS WITHOUT COMPLICATION, WITHOUT LONG-TERM CURRENT USE OF INSULIN (HCC): ICD-10-CM

## 2018-01-26 DIAGNOSIS — E55.9 VITAMIN D DEFICIENCY: Primary | ICD-10-CM

## 2018-01-26 DIAGNOSIS — R79.89 ELEVATED LFTS: ICD-10-CM

## 2018-02-08 LAB
A1AT SERPL-MCNC: 113 MG/DL (ref 90–200)
ALDOLASE SERPL-CCNC: 6.2 U/L (ref 3.3–10.3)
CERULOPLASMIN SERPL-MCNC: 26.1 MG/DL (ref 19–39)
CK SERPL-CCNC: 161 U/L (ref 24–173)
ERYTHROCYTE [DISTWIDTH] IN BLOOD BY AUTOMATED COUNT: 13.3 % (ref 12.3–15.4)
FERRITIN SERPL-MCNC: 183 NG/ML (ref 15–150)
HBV CORE AB SERPL QL IA: NEGATIVE
HBV CORE IGM SERPL QL IA: NEGATIVE
HBV SURFACE AB SER QL: NON REACTIVE
HBV SURFACE AG SERPL QL IA: NEGATIVE
HCT VFR BLD AUTO: 38.9 % (ref 34–46.6)
HGB BLD-MCNC: 13.1 G/DL (ref 11.1–15.9)
INR PPP: 0.9 (ref 0.8–1.2)
IRON SATN MFR SERPL: 19 % (ref 15–55)
IRON SERPL-MCNC: 64 UG/DL (ref 27–159)
MCH RBC QN AUTO: 30.3 PG (ref 26.6–33)
MCHC RBC AUTO-ENTMCNC: 33.7 G/DL (ref 31.5–35.7)
MCV RBC AUTO: 90 FL (ref 79–97)
PLATELET # BLD AUTO: 252 X10E3/UL (ref 150–379)
PROTHROMBIN TIME: 9.9 SEC (ref 9.1–12)
RBC # BLD AUTO: 4.32 X10E6/UL (ref 3.77–5.28)
TIBC SERPL-MCNC: 344 UG/DL (ref 250–450)
UIBC SERPL-MCNC: 280 UG/DL (ref 131–425)
WBC # BLD AUTO: 7.8 X10E3/UL (ref 3.4–10.8)

## 2018-02-12 DIAGNOSIS — E11.9 CONTROLLED TYPE 2 DIABETES MELLITUS WITHOUT COMPLICATION, WITHOUT LONG-TERM CURRENT USE OF INSULIN (HCC): ICD-10-CM

## 2018-02-12 NOTE — TELEPHONE ENCOUNTER
PCP: Evangelist Zaldivar. Sea Samaniego MD    Last appt: 2/7/2018  Future Appointments  Date Time Provider Remi Moody   2/13/2018 2:00 PM [address]  Sea Samaniego MD [facility] SCHED       Requested Prescriptions      No prescriptions requested or ordered in this encounter     Lab Results   Component Value Date/Time    Sodium 142 02/07/2018 11:57 AM    Potassium 4.6 02/07/2018 11:57 AM    Chloride 100 02/07/2018 11:57 AM    CO2 24 02/07/2018 11:57 AM    Anion gap 13 05/04/2010 10:53 AM    Glucose 119 (H) 02/07/2018 11:57 AM    BUN 14 02/07/2018 11:57 AM    Creatinine 0.71 02/07/2018 11:57 AM    BUN/Creatinine ratio 20 02/07/2018 11:57 AM    GFR est  02/07/2018 11:57 AM    GFR est non-AA 95 02/07/2018 11:57 AM    Calcium 9.4 02/07/2018 11:57 AM     Lab Results   Component Value Date/Time    Hemoglobin A1c 6.3 (H) 02/07/2018 11:57 AM     Lab Results   Component Value Date/Time    Cholesterol, total 180 08/28/2017 10:05 AM    HDL Cholesterol 46 08/28/2017 10:05 AM    LDL, calculated 95 08/28/2017 10:05 AM    VLDL, calculated 39 08/28/2017 10:05 AM    Triglyceride 193 (H) 08/28/2017 10:05 AM    CHOL/HDL Ratio 5.7 (H) 05/04/2010 10:53 AM     Lab Results   Component Value Date/Time    TSH 4.260 08/14/2013 09:28 AM

## 2018-02-13 ENCOUNTER — OFFICE VISIT (OUTPATIENT)
Dept: FAMILY MEDICINE CLINIC | Age: 58
End: 2018-02-13

## 2018-02-13 VITALS
WEIGHT: 178 LBS | BODY MASS INDEX: 31.54 KG/M2 | OXYGEN SATURATION: 97 % | TEMPERATURE: 97.3 F | DIASTOLIC BLOOD PRESSURE: 75 MMHG | HEIGHT: 63 IN | SYSTOLIC BLOOD PRESSURE: 132 MMHG | HEART RATE: 77 BPM | RESPIRATION RATE: 16 BRPM

## 2018-02-13 DIAGNOSIS — E11.9 TYPE 2 DIABETES MELLITUS WITHOUT COMPLICATION, WITHOUT LONG-TERM CURRENT USE OF INSULIN (HCC): Primary | ICD-10-CM

## 2018-02-13 DIAGNOSIS — I10 ESSENTIAL HYPERTENSION: ICD-10-CM

## 2018-02-13 DIAGNOSIS — K76.0 FATTY LIVER: ICD-10-CM

## 2018-02-13 DIAGNOSIS — L81.9 PIGMENTED SKIN LESION: ICD-10-CM

## 2018-02-13 DIAGNOSIS — R87.620 ATYPICAL SQUAMOUS CELL CHANGES OF UNDETERMINED SIGNIFICANCE (ASCUS) ON VAGINAL CYTOLOGY: ICD-10-CM

## 2018-02-13 DIAGNOSIS — Z12.11 COLON CANCER SCREENING: ICD-10-CM

## 2018-02-13 DIAGNOSIS — E11.9 CONTROLLED TYPE 2 DIABETES MELLITUS WITHOUT COMPLICATION, WITHOUT LONG-TERM CURRENT USE OF INSULIN (HCC): ICD-10-CM

## 2018-02-13 DIAGNOSIS — Z12.39 BREAST CANCER SCREENING: ICD-10-CM

## 2018-02-13 RX ORDER — METFORMIN HYDROCHLORIDE 500 MG/1
500 TABLET, EXTENDED RELEASE ORAL DAILY
Qty: 90 TAB | Refills: 3
Start: 2018-02-13 | End: 2018-02-19 | Stop reason: SDUPTHER

## 2018-02-13 RX ORDER — GUAIFENESIN 100 MG/5ML
81 LIQUID (ML) ORAL DAILY
Qty: 30 TAB | Refills: 0 | Status: SHIPPED | OUTPATIENT
Start: 2018-02-13 | End: 2018-09-07

## 2018-02-13 RX ORDER — METFORMIN HYDROCHLORIDE 500 MG/1
1000 TABLET, EXTENDED RELEASE ORAL 2 TIMES DAILY
Qty: 120 TAB | Refills: 11 | Status: SHIPPED | OUTPATIENT
Start: 2018-02-13 | End: 2018-02-13 | Stop reason: SDUPTHER

## 2018-02-13 NOTE — PROGRESS NOTES
Cher Quiroz [address] [place] [address] Visit   02/13/2018  Patient ID: Mohan Espinal is a 62 y.o. female. Assessment/Plan:    Diagnoses and all orders for this visit:    1. Type 2 diabetes mellitus without complication, without long-term current use of insulin (HCC)  A1C improved with metformin. Foot exam today. Urine today. BP at goal.   Continue lifestyle changes.   -      DIABETES FOOT EXAM  -     MICROALBUMIN, UR, RAND W/ MICROALBUMIN/CREA RATIO    2. Essential hypertension  At goal. No change. 3. Pigmented skin lesion  Had derm follow up. No biopsy needed. 4. Fatty liver  LFTs improving with weight loss. 5. Breast cancer screening  -     [facility] MAMMO BI SCREENING INCL CAD; Future    6. Colon cancer screening  Discussed options of CRC screening, highlighting FIT vs colonoscopy. Pt opts for FIT  -     OCCULT BLOOD, IMMUNOASSAY (FIT); Future    7. Atypical squamous cell changes of undetermined significance (ASCUS) on vaginal cytology  Return for pap. ASCUS in 2016, HPV not tested, due for 1 year repeat. 8. Controlled type 2 diabetes mellitus without complication, without long-term current use of insulin (HCC)  -     metFORMIN ER (GLUCOPHAGE XR) 500 mg tablet; Take 1 Tab by mouth daily. Other orders  -     aspirin 81 mg chewable tablet; Take 1 Tab by mouth daily. Counselled pt on Patient health concerns and plans. Patient was offered a choice/choices in the treatment plan today. Reviewed return precautions as appropriate. Patient expresses understanding of the plan and agrees with recommendations. See patient instructions for more.        Patient Instructions   TODAY, please go to:   CHECK OUT       Please schedule the following appointments at CHECK OUT:  Pap smear with Dr. Liv Perla next available    Today's Plan:  Keep up the great work         Subjective:   HPI:  Mohan Espinal is a 62 y.o. female being seen for:   Chief Complaint   Patient presents with    Diabetes    Results Labs        Saw derm, no biopsy needed. Reassured. Has been to [facility] [place]    Lab review/dermatology   · Labs reviewed in detail  · Liver numbers are improving   · A1C   · Taking metformin. A1C 6.3% today . Screening and Prevention Due:  Health Maintenance Due   Topic Date Due    FOOT EXAM Q1  1970    MICROALBUMIN Q1  1970    EYE EXAM RETINAL OR DILATED Q1  1970    FOBT Q 1 YEAR AGE 50-75  2010    PAP AKA CERVICAL CYTOLOGY  2017    BREAST CANCER SCRN MAMMOGRAM  05/15/2018      . OB History    Para Term  AB Living   5 4   1 4   SAB TAB Ectopic Molar Multiple Live Births              # Outcome Date GA Lbr Josh/2nd Weight Sex Delivery Anes PTL Lv   5 Para            4 AB            3 Para     M       2 Para     F       1 Para     F          Obstetric Comments   Postmenopausal. LMP around 47 yo   Patient's last menstrual period was 12/15/2011. H/o abnl pap: yes        Review of Systems  Otherwise as noted in HPI  ? Objective:     Visit Vitals    /75 (BP 1 Location: Right arm, BP Patient Position: Sitting)    Pulse 77    Temp 97.3 °F (36.3 °C) (Oral)    Resp 16    Ht 5' 3\" (1.6 m)    Wt 178 lb (80.7 kg)    SpO2 97%    BMI 31.53 kg/m2     Wt Readings from Last 3 Encounters:   18 178 lb (80.7 kg)   17 181 lb (82.1 kg)   17 184 lb 4.8 oz (83.6 kg)     BP Readings from Last 3 Encounters:   18 132/75   17 133/66   17 140/88     PHQ over the last two weeks 2018   Little interest or pleasure in doing things Not at all   Feeling down, depressed or hopeless Not at all   Total Score PHQ 2 0     Physical Exam   Constitutional: She appears well-developed and well-nourished. No distress. Pulmonary/Chest: Effort normal. No respiratory distress. Neurological: She is alert. Psychiatric: She has a normal mood and affect.  Her behavior is normal.       Allergies   Allergen Reactions    Pcn [Penicillins] Hives    Lisinopril Cough     Dry cough    Nsaids (Non-Steroidal Anti-Inflammatory Drug) Other (comments)     Abdominal pain     Prior to Admission medications    Medication Sig Start Date End Date Taking? Authorizing Provider   aspirin 81 mg chewable tablet Take 1 Tab by mouth daily. 2/13/18  Yes Eriberto Yoder. Wilber Ware MD   metFORMIN ER (GLUCOPHAGE XR) 500 mg tablet Take 1 Tab by mouth daily. 2/13/18  Yes Eriberto Yoder. Wilber Ware MD   atorvastatin (LIPITOR) 20 mg tablet Take 1 Tab by mouth daily. 8/21/17  Yes Eriberto Yoder. Wilber Ware MD   omeprazole (PRILOSEC) 10 mg capsule TAKE ONE CAPSULE BY MOUTH ONE TIME DAILY 6/28/17  Yes Eriberto Yoder. Wilber Ware MD   losartan (COZAAR) 50 mg tablet TAKE ONE TABLET BY MOUTH ONE TIME DAILY 6/28/17  Yes Eriberto Yoder. Wilber Ware MD   cholecalciferol, vitamin D3, (VITAMIN D3) 2,000 unit Tab Take 5,000 Int'l Units by mouth daily.    Yes Historical Provider     Patient Active Problem List   Diagnosis Code    Vitamin D deficiency E55.9    Heel spur M77.30    Iron deficiency anemia D50.9    Fibrocystic breast changes N60.19    Other and unspecified hyperlipidemia E78.5    Back pain M54.9    Leg pain, left M79.605    Hyperglycemia R73.9    Hypertension I10    Type 2 diabetes mellitus without complication, without long-term current use of insulin (HCC) E11.9    High triglycerides E78.1    Fatty liver K76.0    Atypical squamous cell changes of undetermined significance (ASCUS) on vaginal cytology R87.620

## 2018-02-13 NOTE — PROGRESS NOTES
Chief Complaint   Patient presents with    Diabetes    Results     Labs      1. Have you been to the ER, urgent care clinic since your last visit? Hospitalized since your last visit? No     2. Have you seen or consulted any other health care providers outside of the [facility] since your last visit? Include any pap smears or colon screening. No     The patient was counseled on the dangers of tobacco use, and was advised never to start  . Reviewed strategies to maximize success, including never to start. Tati Velazquez  Identified pt with two pt identifiers(name and ). Chief Complaint   Patient presents with    Diabetes    Results     Labs        1. Have you been to the ER, urgent care clinic since your last visit? Hospitalized since your last visit? NO    2. Have you seen or consulted any other health care providers outside of the [facility] since your last visit? Include any pap smears or colon screening. NO    Today's provider has been notified of reason for visit, vitals and flowsheets obtained on patients. Patient received paperwork for advance directive during previous visit but has not completed at this time     Reviewed record In preparation for visit, huddled with provider and have obtained necessary documentation      Health Maintenance Due   Topic    FOOT EXAM Q1 Discussed with patient today and advised to follow up.  MICROALBUMIN Q1 Discussed with patient today and advised to follow up.  EYE EXAM RETINAL OR DILATED Q1 Discussed with patient today and advised to follow up.  FOBT Q 1 YEAR AGE 50-75 Discussed with patient today and advised to follow up.  PAP AKA CERVICAL CYTOLOGY Discussed with patient today and advised to follow up.  Influenza Age 5 to Adult Discussed with patient today and advised to follow up.  BREAST CANCER SCRN MAMMOGRAM Discussed with patient today and advised to follow up.           Wt Readings from Last 3 Encounters:   08/31/17 181 lb (82.1 kg)   05/30/17 184 lb 4.8 oz (83.6 kg)   01/30/17 185 lb 3.2 oz (84 kg)     Temp Readings from Last 3 Encounters:   08/31/17 97.7 °F (36.5 °C) (Oral)   05/30/17 97.5 °F (36.4 °C) (Oral)   01/30/17 98.6 °F (37 °C) (Oral)     BP Readings from Last 3 Encounters:   08/31/17 133/66   05/30/17 140/88   01/30/17 140/80     Pulse Readings from Last 3 Encounters:   08/31/17 77   05/30/17 88   01/30/17 77     There were no vitals filed for this visit. Learning Assessment:  :     Learning Assessment 7/10/2015   PRIMARY LEARNER Patient   HIGHEST LEVEL OF EDUCATION - PRIMARY LEARNER  2 YEARS OF COLLEGE   BARRIERS PRIMARY LEARNER NONE   CO-LEARNER CAREGIVER No   PRIMARY LANGUAGE OTHER (COMMENT)   LEARNER PREFERENCE PRIMARY DEMONSTRATION     LISTENING   ANSWERED BY Patient   RELATIONSHIP SELF       Depression Screening:  :     PHQ over the last two weeks 2/13/2018   Little interest or pleasure in doing things Not at all   Feeling down, depressed or hopeless Not at all   Total Score PHQ 2 0       Fall Risk Assessment:  :     No flowsheet data found. Abuse Screening:  :     No flowsheet data found. ADL Screening:  :     No flowsheet data found. ACP is not on file, advised to return. Medication reconciliation up to date and corrected with patient at this time. Diabetic foot exam performed by Mora Torres LPN     Measurement  Response Nurse Comment Physician Comment   Monofilament  R - normal sensation with micro filament  L - normal sensation with micro filament     Pulse DP R - present  L - present     Pulse TP R - present  L - present     Structural deformity R - None  L - None     Skin Integrity / Deformity R - None  L - None        Reviewed by:  Mora Torres LPN

## 2018-02-13 NOTE — PATIENT INSTRUCTIONS
TODAY, please go to:   CHECK OUT       Please schedule the following appointments at CHECK OUT:  Pap smear with Dr. Liv Perla next available    Today's Plan:  Keep up the great work

## 2018-02-13 NOTE — LETTER
2/13/2018 2:08 PM 
 
Ms. Latia Velazquez 
[address] [place] [address] Lab Only on 01/26/2018 Component Date Value Ref Range Status  Glucose 02/07/2018 119* 65 - 99 mg/dL Final  
 BUN 02/07/2018 14  6 - 24 mg/dL Final  
 Creatinine 02/07/2018 0.71  0.57 - 1.00 mg/dL Final  
 GFR est non-AA 02/07/2018 95  >59 mL/min/1.73 Final  
 GFR est AA 02/07/2018 109  >59 mL/min/1.73 Final  
 BUN/Creatinine ratio 02/07/2018 20  9 - 23 Final  
 Sodium 02/07/2018 142  134 - 144 mmol/L Final  
 Potassium 02/07/2018 4.6  3.5 - 5.2 mmol/L Final  
 Chloride 02/07/2018 100  96 - 106 mmol/L Final  
 CO2 02/07/2018 24  18 - 29 mmol/L Final  
 Calcium 02/07/2018 9.4  8.7 - 10.2 mg/dL Final  
 Protein, total 02/07/2018 7.2  6.0 - 8.5 g/dL Final  
 Albumin 02/07/2018 4.6  3.5 - 5.5 g/dL Final  
 GLOBULIN, TOTAL 02/07/2018 2.6  1.5 - 4.5 g/dL Final  
 A-G Ratio 02/07/2018 1.8  1.2 - 2.2 Final  
 Bilirubin, total 02/07/2018 0.4  0.0 - 1.2 mg/dL Final  
 Alk. phosphatase 02/07/2018 126* 39 - 117 IU/L Final  
 AST (SGOT) 02/07/2018 32  0 - 40 IU/L Final  
 ALT (SGPT) 02/07/2018 57* 0 - 32 IU/L Final  
 Hemoglobin A1c 02/07/2018 6.3* 4.8 - 5.6 % Final  
 Estimated average glucose 02/07/2018 134  mg/dL Final  
 
 
 
Legend: Use this to help understand your labs. You may not have all of these ordered · WBC- White blood cell count · HGB- Hemoglobin, red blood cell count · HCT- Hematocrit, red blood cell count · PLT- Platelets · Sodium, Potassium, Chloride, Magnesium- electrolytes · Creatinine, GFR- kidney function · AST, ALT, Alkaline phosphatase, bilirubin- liver and gallbladder · Lipase- pancreas · RPR- Syphilis test, STD 
· HIV, Gonorrhea, Chlamydia, Trichomonas- STDs · Candida- yeast infection · Nuswab- vaginal infections · Urinalysis- a quick test about your urine · Hemoglobin A1C- diabetes test 
· Glucose- blood sugar · HDL- \"Good\" Cholesterol. Goal >=40 · LDL- \"Bad\" Cholesterol. Goal <100 · Triglycerides- Goal <150 · Vit D- Vitamin D level · TSH, FT3, FT4- thyroid tests · HCV Ab- test for Hepatitis C  
· Sincerely, 
 
 
[address] [facility]  Butch Plaza MD

## 2018-02-13 NOTE — MR AVS SNAPSHOT
[address] [place] [address] 
[address] [facility] [place] [address] 
[phone] Patient: Monique Cuellar MRN: [id number] EJA:[birth year] Visit Information Date & Time Provider Department Dept. Phone Encounter #  
 2/13/2018  2:00 PM Sarbjit Gan MD [facility] [phone] [id number] Upcoming Health Maintenance Date Due  
 FOOT EXAM Q1 5/28/1970 MICROALBUMIN Q1 5/28/1970 EYE EXAM RETINAL OR DILATED Q1 5/28/1970 FOBT Q 1 YEAR AGE 50-75 5/28/2010 PAP AKA CERVICAL CYTOLOGY 6/28/2017 BREAST CANCER SCRN MAMMOGRAM 5/15/2018 HEMOGLOBIN A1C Q6M 8/7/2018 LIPID PANEL Q1 8/28/2018 DTaP/Tdap/Td series (2 - Td) 4/15/2019 Allergies as of 2/13/2018  Review Complete On: 2/13/2018 By: Manny Olmstead LPN Severity Noted Reaction Type Reactions Pcn [Penicillins] High 04/16/2010    Hives Lisinopril  01/23/2013   Side Effect Cough Dry cough Nsaids (Non-steroidal Anti-inflammatory Drug)  12/03/2012    Other (comments) Abdominal pain Current Immunizations  Reviewed on 1/30/2017 Name Date Influenza Vaccine 10/11/2017, 10/1/2016, 10/11/2013 Influenza Vaccine (Quad) PF 10/7/2014 Influenza Vaccine Split 10/15/2012 Pneumococcal Polysaccharide (PPSV-23) 8/31/2017 TDAP Vaccine 4/15/2009 Not reviewed this visit You Were Diagnosed With   
  
 Codes Comments Type 2 diabetes mellitus without complication, without long-term current use of insulin (HCC)    -  Primary ICD-10-CM: E11.9 ICD-9-CM: 250.00 Essential hypertension     ICD-10-CM: I10 
ICD-9-CM: 401.9 Pigmented skin lesion     ICD-10-CM: L81.9 ICD-9-CM: 709.00 Fatty liver     ICD-10-CM: K76.0 ICD-9-CM: 571.8 Breast cancer screening     ICD-10-CM: Z12.31 
ICD-9-CM: V76.10 Colon cancer screening     ICD-10-CM: Z12.11 ICD-9-CM: V76.51  Atypical squamous cell changes of undetermined significance (ASCUS) on vaginal cytology     ICD-10-CM: R87.620 ICD-9-CM: 795.11 Controlled type 2 diabetes mellitus without complication, without long-term current use of insulin ([facility]     ICD-10-CM: E11.9 ICD-9-CM: 250.00 Vitals BP Pulse Temp Resp Height(growth percentile) Weight(growth percentile) 132/75 (BP 1 Location: Right arm, BP Patient Position: Sitting) 77 97.3 °F (36.3 °C) (Oral) 16 5' 3\" (1.6 m) 178 lb (80.7 kg) LMP SpO2 BMI OB Status Smoking Status 03/04/2012 97% 31.53 kg/m2 Postmenopausal Never Smoker BMI and BSA Data Body Mass Index Body Surface Area  
 31.53 kg/m 2 1.89 m 2 Preferred Pharmacy Pharmacy Name Phone [address] [phone] Your Updated Medication List  
  
   
This list is accurate as of: 2/13/18  3:07 PM.  Always use your most recent med list.  
  
  
  
  
 aspirin 81 mg chewable tablet Take 1 Tab by mouth daily. atorvastatin 20 mg tablet Commonly known as:  LIPITOR Take 1 Tab by mouth daily. losartan 50 mg tablet Commonly known as:  COZAAR  
TAKE ONE TABLET BY MOUTH ONE TIME DAILY  
  
 metFORMIN  mg tablet Commonly known as:  GLUCOPHAGE XR Take 1 Tab by mouth daily. omeprazole 10 mg capsule Commonly known as:  PRILOSEC  
TAKE ONE CAPSULE BY MOUTH ONE TIME DAILY  
  
 VITAMIN D3 2,000 unit Tab Generic drug:  cholecalciferol (vitamin D3) Take 5,000 Int'l Units by mouth daily. Prescriptions Sent to Pharmacy Refills  
 aspirin 81 mg chewable tablet 0 Sig: Take 1 Tab by mouth daily. Class: Normal  
 Pharmacy: [facility] [address] [facility], [address] [facility] Ph #: [phone] Route: Oral  
  
We Performed the Following  DIABETES FOOT EXAM [7 Custom] MICROALBUMIN, UR, RAND W/ MICROALBUMIN/CREA RATIO [id number] CPT(R)] To-Do List   
 02/13/2018 Imaging:  [facility] MAMMO BI SCREENING INCL CAD   
  
 03/15/2018 Lab:  OCCULT BLOOD, IMMUNOASSAY (FIT) Patient Instructions TODAY, please go to: CHECK OUT Please schedule the following appointments at CHECK OUT: 
Pap smear with Dr. Fidencio Santana next available Today's Plan: 
Keep up the great work Introducing [facility] SERVICES! Dear Justin Kwon: Thank you for requesting a [employer] account. Our records indicate that you have previously registered for a [employer] account but its currently inactive. Please call our [employer] support line at [phone]. Additional Information If you have questions, please visit the Frequently Asked Questions section of the [employer] website at [email or web address] Remember, [employer] is NOT to be used for urgent needs. For medical emergencies, dial 911. Now available from your iPhone and Android! Please provide this summary of care documentation to your next provider. Your primary care clinician is listed as Jaky Pratt. If you have any questions after today's visit, please call [phone].

## 2018-02-14 DIAGNOSIS — R19.5 POSITIVE FIT (FECAL IMMUNOCHEMICAL TEST): Primary | ICD-10-CM

## 2018-02-14 LAB
ALBUMIN SERPL-MCNC: 4.6 G/DL (ref 3.5–5.5)
ALBUMIN/CREAT UR: 12.3 MG/G CREAT (ref 0–30)
ALBUMIN/GLOB SERPL: 1.8 {RATIO} (ref 1.2–2.2)
ALP SERPL-CCNC: 126 IU/L (ref 39–117)
ALT SERPL-CCNC: 57 IU/L (ref 0–32)
AST SERPL-CCNC: 32 IU/L (ref 0–40)
BILIRUB SERPL-MCNC: 0.4 MG/DL (ref 0–1.2)
BUN SERPL-MCNC: 14 MG/DL (ref 6–24)
BUN/CREAT SERPL: 20 (ref 9–23)
CALCIUM SERPL-MCNC: 9.4 MG/DL (ref 8.7–10.2)
CHLORIDE SERPL-SCNC: 100 MMOL/L (ref 96–106)
CO2 SERPL-SCNC: 24 MMOL/L (ref 18–29)
CREAT SERPL-MCNC: 0.71 MG/DL (ref 0.57–1)
CREAT UR-MCNC: 120 MG/DL
EST. AVERAGE GLUCOSE BLD GHB EST-MCNC: 134 MG/DL
GFR SERPLBLD CREATININE-BSD FMLA CKD-EPI: 109 ML/MIN/1.73
GFR SERPLBLD CREATININE-BSD FMLA CKD-EPI: 95 ML/MIN/1.73
GLOBULIN SER CALC-MCNC: 2.6 G/DL (ref 1.5–4.5)
GLUCOSE SERPL-MCNC: 119 MG/DL (ref 65–99)
HBA1C MFR BLD: 6.3 % (ref 4.8–5.6)
Lab: NORMAL
MICROALBUMIN UR-MCNC: 14.7 UG/ML
POTASSIUM SERPL-SCNC: 4.6 MMOL/L (ref 3.5–5.2)
PROT SERPL-MCNC: 7.2 G/DL (ref 6–8.5)
SODIUM SERPL-SCNC: 142 MMOL/L (ref 134–144)

## 2018-02-15 ENCOUNTER — OFFICE VISIT (OUTPATIENT)
Dept: FAMILY MEDICINE CLINIC | Age: 58
End: 2018-02-15

## 2018-02-15 ENCOUNTER — HOSPITAL ENCOUNTER (OUTPATIENT)
Dept: LAB | Age: 58
Discharge: HOME | End: 2018-02-15
Payer: COMMERCIAL

## 2018-02-15 VITALS
HEART RATE: 84 BPM | BODY MASS INDEX: 31.18 KG/M2 | WEIGHT: 176 LBS | RESPIRATION RATE: 18 BRPM | HEIGHT: 63 IN | DIASTOLIC BLOOD PRESSURE: 82 MMHG | SYSTOLIC BLOOD PRESSURE: 144 MMHG | OXYGEN SATURATION: 97 % | TEMPERATURE: 98.2 F

## 2018-02-15 DIAGNOSIS — R87.620 ATYPICAL SQUAMOUS CELL CHANGES OF UNDETERMINED SIGNIFICANCE (ASCUS) ON VAGINAL CYTOLOGY: Primary | ICD-10-CM

## 2018-02-15 PROCEDURE — 87624 HPV HI-RISK TYP POOLED RSLT: CPT | Performed by: FAMILY MEDICINE

## 2018-02-15 PROCEDURE — 88175 CYTOPATH C/V AUTO FLUID REDO: CPT | Performed by: FAMILY MEDICINE

## 2018-02-15 NOTE — MR AVS SNAPSHOT
[address] [place] [address] 
 
 
 [address] [facility] [address] 
[address] [facility] [address] 
[phone] Patient: García Mijares MRN: [id number] OAX:8[birth year] Visit Information Date & Time Provider Department Dept. Phone Encounter #  
 2/15/2018  3:40 PM Cindy Cox. Danitza Walker MD [facility] [phone] [id number] Upcoming Health Maintenance Date Due  
 EYE EXAM RETINAL OR DILATED Q1 5/28/1970 FOBT Q 1 YEAR AGE 50-75 5/28/2010 PAP AKA CERVICAL CYTOLOGY 6/28/2017 BREAST CANCER SCRN MAMMOGRAM 5/15/2018 HEMOGLOBIN A1C Q6M 8/7/2018 LIPID PANEL Q1 8/28/2018 FOOT EXAM Q1 2/13/2019 MICROALBUMIN Q1 2/13/2019 DTaP/Tdap/Td series (2 - Td) 4/15/2019 Allergies as of 2/15/2018  Review Complete On: 2/15/2018 By: Fabienne Maradiaga LPN Severity Noted Reaction Type Reactions Pcn [Penicillins] High 04/16/2010    Hives Lisinopril  01/23/2013   Side Effect Cough Dry cough Nsaids (Non-steroidal Anti-inflammatory Drug)  12/03/2012    Other (comments) Abdominal pain Current Immunizations  Reviewed on 1/30/2017 Name Date Influenza Vaccine 10/11/2017, 10/1/2016, 10/11/2013 Influenza Vaccine (Quad) PF 10/7/2014 Influenza Vaccine Split 10/15/2012 Pneumococcal Polysaccharide (PPSV-23) 8/31/2017 TDAP Vaccine 4/15/2009 Not reviewed this visit You Were Diagnosed With   
  
 Codes Comments Atypical squamous cell changes of undetermined significance (ASCUS) on vaginal cytology    -  Primary ICD-10-CM: R87.620 ICD-9-CM: 795.11 Vitals BP Pulse Temp Resp Height(growth percentile) Weight(growth percentile) 144/82 (BP 1 Location: Right arm, BP Patient Position: Sitting) 84 98.2 °F (36.8 °C) (Oral) 18 5' 3\" (1.6 m) 176 lb (79.8 kg) LMP SpO2 BMI OB Status Smoking Status 03/04/2012 97% 31.18 kg/m2 Postmenopausal Never Smoker Vitals History BMI and BSA Data Body Mass Index Body Surface Area 31.18 kg/m 2 1.88 m 2 Preferred Pharmacy Pharmacy Name Phone [facility] [address] [phone] Your Updated Medication List  
  
   
This list is accurate as of: 2/15/18  4:03 PM.  Always use your most recent med list.  
  
  
  
  
 aspirin 81 mg chewable tablet Take 1 Tab by mouth daily. atorvastatin 20 mg tablet Commonly known as:  LIPITOR Take 1 Tab by mouth daily. losartan 50 mg tablet Commonly known as:  COZAAR  
TAKE ONE TABLET BY MOUTH ONE TIME DAILY  
  
 metFORMIN  mg tablet Commonly known as:  GLUCOPHAGE XR Take 1 Tab by mouth daily. omeprazole 10 mg capsule Commonly known as:  PRILOSEC  
TAKE ONE CAPSULE BY MOUTH ONE TIME DAILY  
  
 VITAMIN D3 2,000 unit Tab Generic drug:  cholecalciferol (vitamin D3) Take 5,000 Int'l Units by mouth daily. To-Do List   
 02/15/2018 Pathology:  PAP IG, HPV AND RFX HPV 84/32,52([id number]) Patient Instructions Lauren Dies TODAY, please go to: CHECK OUT Please schedule the following appointments at [address] [place] [address]: 
· Complete Physical Exam and lab follow up with Dr. Zohra Rosen in August 2018 · Lab visit, fasting, the week before our visit. Introducing [facility] & HEALTH SERVICES! Dear Grace Colvin: Thank you for requesting a [employer] account. Our records indicate that you have previously registered for a [employer] account but its currently inactive. Please call our [employer] support line at [phone]. Additional Information If you have questions, please visit the Frequently Asked Questions section of the [employer] website at [email or web address] Remember, [employer] is NOT to be used for urgent needs. For medical emergencies, dial 911. Now available from your iPhone and Android! Please provide this summary of care documentation to your next provider. Your primary care clinician is listed as Bettie Tobar.  If you have any questions after today's visit, please call [phone].

## 2018-02-15 NOTE — PROGRESS NOTES
Chief Complaint   Patient presents with   Bob Junk Exam     1. Have you been to the ER, urgent care clinic since your last visit? Hospitalized since your last visit? No     2. Have you seen or consulted any other health care providers outside of the [address] [place] [address] since your last visit? Include any pap smears or colon screening. No    The patient was counseled on the dangers of tobacco use, and was advised never to start again. Reviewed strategies to maximize success, including never to start. Truman Velazquez  Identified pt with two pt identifiers(name and ). Chief Complaint   Patient presents with   Bob Junk Exam       1. Have you been to the ER, urgent care clinic since your last visit? Hospitalized since your last visit? NO    2. Have you seen or consulted any other health care providers outside of the [address] [place] [address] since your last visit? Include any pap smears or colon screening. NO    Today's provider has been notified of reason for visit, vitals and flowsheets obtained on patients. Patient received paperwork for advance directive during previous visit but has not completed at this time     Reviewed record In preparation for visit, huddled with provider and have obtained necessary documentation      Health Maintenance Due   Topic    EYE EXAM RETINAL OR DILATED Q1 Discussed with patient today and advised to follow up.  FOBT Q 1 YEAR AGE 50-75 Discussed with patient today and advised to follow up.  PAP AKA CERVICAL CYTOLOGY Discussed with patient today and advised to follow up.  BREAST CANCER SCRN MAMMOGRAM Discussed with patient today and advised to follow up.           Wt Readings from Last 3 Encounters:   18 178 lb (80.7 kg)   17 181 lb (82.1 kg)   17 184 lb 4.8 oz (83.6 kg)     Temp Readings from Last 3 Encounters:   18 97.3 °F (36.3 °C) (Oral)   17 97.7 °F (36.5 °C) (Oral)   17 97.5 °F (36.4 °C) (Oral)     BP Readings from Last 3 Encounters:   02/13/18 132/75   08/31/17 133/66   05/30/17 140/88     Pulse Readings from Last 3 Encounters:   02/13/18 77   08/31/17 77   05/30/17 88     There were no vitals filed for this visit. Learning Assessment:  :     Learning Assessment 7/10/2015   PRIMARY LEARNER Patient   HIGHEST LEVEL OF EDUCATION - PRIMARY LEARNER  2 YEARS OF COLLEGE   BARRIERS PRIMARY LEARNER NONE   CO-LEARNER CAREGIVER No   PRIMARY LANGUAGE OTHER (COMMENT)   LEARNER PREFERENCE PRIMARY DEMONSTRATION     LISTENING   ANSWERED BY Patient   RELATIONSHIP SELF       Depression Screening:  :     PHQ over the last two weeks 2/15/2018   Little interest or pleasure in doing things Not at all   Feeling down, depressed or hopeless Not at all   Total Score PHQ 2 0       Fall Risk Assessment:  :     No flowsheet data found. Abuse Screening:  :     No flowsheet data found. ADL Screening:  :     No flowsheet data found. ACP is not on file, advised to return. Medication reconciliation up to date and corrected with patient at this time.

## 2018-02-15 NOTE — PATIENT INSTRUCTIONS
Cheng Kong TODAY, please go to:   CHECK OUT        Please schedule the following appointments at [address] [place] [address]:  · Complete Physical Exam and lab follow up with Dr. Lien Morin in August 2018  · Lab visit, fasting, the week before our visit.

## 2018-02-15 NOTE — LETTER
4/12/2018 10:42 AM 
 
Ms. Rehan VALLEJO Mitha 
[address] [place] [address] Dear Jaylan Bolton: 
 
Please find your most recent results below. Resulted Orders CX-VAG CYTOLOGY Narrative [address]  [facility]      [address]         [address] [facility]      [place], [address]     [address], [address] [facility] 
[phone] Fax# [phone]    Fax# [phone]      Fax# [phone] 
 
========================================================================== 
                       * * * CYTOPATHOLOGY REPORT* * *   
========================================================================== 
GYNECOLOGICAL * * *Procedures/Addenda Present * * * Patient:  Lady Lopez             Specimen #:  [id number] Age:  1960 (Age: 62)                Date of Procedure: 2/15/2018 Sex:  F                                  Date of Receipt:  2/16/2018 Hospital#:  [id number]               Date of Report: 2/20/2018 Med. Record #:  [id number] Location:  [address] [place] [address] Physician(s):  Trung Sabillon MD ([id number]) ADDITIONAL PATIENT INFORMATION:  
RFX 12 , 25 / 39 HPV REGARDLESS:  
YES  
SOURCE: 
A: Cervical/Endocervical Imaged Processed Thin Prep 
 
 
============================================================================ 
                                * * * FINAL INTERPRETATION* * * Cervical/Endocervical Imaged Processed Thin Prep Satisfactory for evaluation. NEGATIVE FOR INTRAEPITHELIAL LESION OR MALIGNANCY. Lyndsay Bulls, CT (ASCP) HPV HR Interpretation Test: HPV, high-risk Result: NEGATIVE                         
 
 Reference Interval: Negative This high-risk HPV test detects fourteen high-risk types 
(16/18/31/33/35/39/45/51/52/56/58/59/66/68) without differentiation, using 
nucleic acid amplification method. Test performed by: Shawn Gooden  Dr. Danya Dickey, [place] [address] Phone number:  [phone] Rashida Vimal * * *Electronically Signed* * * 
2/20/2018 ICD10 Codes: 
 R87.620 The Pap test is a screening procedure to aid in the detection of cervical 
cancer and its precursors. It should not be used as the sole means of 
detecting cervical cancer. As with any laboratory test, false negative 
and false positive results are known to occur. RECOMMENDATIONS: 
 
 
Dear Ms. Cabrera Splinter news! Your pap smear (cervical cancer screening) was negative. You are due to repeat screening in 5 years. Best,  
Dr. Elspeth Saint Please call me if you have any questions: [phone] Sincerely, [facility] OUTREACH INSURANCE

## 2018-02-19 DIAGNOSIS — E11.9 CONTROLLED TYPE 2 DIABETES MELLITUS WITHOUT COMPLICATION, WITHOUT LONG-TERM CURRENT USE OF INSULIN (HCC): ICD-10-CM

## 2018-02-19 RX ORDER — METFORMIN HYDROCHLORIDE 500 MG/1
500 TABLET, EXTENDED RELEASE ORAL DAILY
Qty: 90 TAB | Refills: 3 | Status: SHIPPED | OUTPATIENT
Start: 2018-02-19 | End: 2019-02-13 | Stop reason: SDUPTHER

## 2018-02-21 LAB — HEMOCCULT STL QL IA: POSITIVE

## 2018-02-22 PROBLEM — R19.5 POSITIVE FIT (FECAL IMMUNOCHEMICAL TEST): Status: ACTIVE | Noted: 2018-02-22

## 2018-02-22 NOTE — PROGRESS NOTES
2/22/2018 5:48 PM -- [phone] (home)   Discussed result, need for colonoscopy. She has hemorrhoids. Understands the  Need to r/o malignancy. Referral placed and I gave pt the name, number and location of provider over the phone. She will call to schedule ASAP, re: positive FIT    Pt asked about pap smear results while on the phone. I informed her that the results were normal, next pap due in 5 years.

## 2018-02-28 NOTE — PROGRESS NOTES
[address] [place] [address] Visit   02/15/2018  Patient ID: Yecenia Ramos is a 62 y.o. female. Assessment/Plan:    Diagnoses and all orders for this visit:    1. Atypical squamous cell changes of undetermined significance (ASCUS) on vaginal cytology  -     PAP IG, HPV AND RFX HPV 73/67,26([id number]); Future    ASCUS in 2016, HPV not tested, due for 1 year repeat. Counselled pt on Patient health concerns and plans. Patient was offered a choice/choices in the treatment plan today. Reviewed return precautions as appropriate. Patient expresses understanding of the plan and agrees with recommendations. See patient instructions for more. Patient Instructions   . TODAY, please go to:   CHECK OUT        Please schedule the following appointments at [address] [place] [address]:  · Complete Physical Exam and lab follow up with Dr. Dalton Ch in August 2018  · Lab visit, fasting, the week before our visit. Subjective:   HPI:  Yecenia Ramos is a 62 y.o. female being seen for:   Chief Complaint   Patient presents with   [facility] Exam       · Here for pap today  ASCUS in 2016, HPV not tested, due for 1 year repeat. Screening and Prevention Due:  Health Maintenance Due   Topic Date Due    EYE EXAM RETINAL OR DILATED Q1  05/28/1970    BREAST CANCER SCRN MAMMOGRAM  05/15/2018         Review of Systems  Otherwise as noted in HPI  ? Objective:     Visit Vitals    /82 (BP 1 Location: Right arm, BP Patient Position: Sitting)    Pulse 84    Temp 98.2 °F (36.8 °C) (Oral)    Resp 18    Ht 5' 3\" (1.6 m)    Wt 176 lb (79.8 kg)    SpO2 97%    BMI 31.18 kg/m2       Physical Exam   Constitutional: She appears well-developed and well-nourished. No distress. Pulmonary/Chest: Effort normal. No respiratory distress. Right breast exhibits no inverted nipple, no mass, no nipple discharge, no skin change and no tenderness. Left breast exhibits no inverted nipple, no mass, no nipple discharge, no skin change and no tenderness. Breasts are symmetrical.   Genitourinary: Vagina normal and uterus normal. There is no rash on the right labia. There is no rash on the left labia. Cervix exhibits no motion tenderness, no discharge and no friability. Right adnexum displays no mass, no tenderness and no fullness. Left adnexum displays no mass, no tenderness and no fullness. Lymphadenopathy:     She has no cervical adenopathy. She has no axillary adenopathy. Right: No inguinal and no supraclavicular adenopathy present. Left: No inguinal and no supraclavicular adenopathy present. Neurological: She is alert. Psychiatric: She has a normal mood and affect. Her behavior is normal.       Allergies   Allergen Reactions    Pcn [Penicillins] Hives    Lisinopril Cough     Dry cough    Nsaids (Non-Steroidal Anti-Inflammatory Drug) Other (comments)     Abdominal pain     Prior to Admission medications    Medication Sig Start Date End Date Taking? Authorizing Provider   aspirin 81 mg chewable tablet Take 1 Tab by mouth daily. 2/13/18  Yes Erick Garcia. Vijay Gonzalez MD   atorvastatin (LIPITOR) 20 mg tablet Take 1 Tab by mouth daily. 8/21/17  Yes Erick Garcia. Vijay Gonzalez MD   omeprazole (PRILOSEC) 10 mg capsule TAKE ONE CAPSULE BY MOUTH ONE TIME DAILY 6/28/17  Yes Erick Garcia. Vijay Gonzalez MD   losartan (COZAAR) 50 mg tablet TAKE ONE TABLET BY MOUTH ONE TIME DAILY 6/28/17  Yes Ercik Garcia. Vijay Gonzalez MD   cholecalciferol, vitamin D3, (VITAMIN D3) 2,000 unit Tab Take 5,000 Int'l Units by mouth daily. Yes Historical Provider   metFORMIN ER (GLUCOPHAGE XR) 500 mg tablet Take 1 Tab by mouth daily. 2/19/18   Erick Garcia.  Vijay Gonzalez MD     Patient Active Problem List   Diagnosis Code    Vitamin D deficiency E55.9    Heel spur M77.30    Iron deficiency anemia D50.9    Fibrocystic breast changes N60.19    Other and unspecified hyperlipidemia E78.5    Back pain M54.9    Leg pain, left M79.605    Hyperglycemia R73.9    Hypertension I10    Type 2 diabetes mellitus without complication, without long-term current use of insulin (HCC) E11.9    High triglycerides E78.1    Fatty liver K76.0    Atypical squamous cell changes of undetermined significance (ASCUS) on vaginal cytology R87.620    Positive FIT (fecal immunochemical test) R19.5

## 2018-03-15 DIAGNOSIS — Z12.11 COLON CANCER SCREENING: ICD-10-CM

## 2018-04-12 NOTE — PROGRESS NOTES
Ascus in 2016 (HPV not tested) 2018 repeat normal cytology and HPV negative. Per ASCCP guidelines, can return to routine screening intervals. Therefore, will repeat pap again in 5 years.

## 2018-04-12 NOTE — PROGRESS NOTES
Letter please  _____________________     Dear Ms. Keyes Nations news! Your pap smear (cervical cancer screening) was negative. You are due to repeat screening in 5 years.   Best,  Dr. Liv Perla

## 2018-07-23 RX ORDER — OMEPRAZOLE 10 MG/1
CAPSULE, DELAYED RELEASE ORAL
Qty: 90 CAP | Refills: 0 | Status: SHIPPED | OUTPATIENT
Start: 2018-07-23 | End: 2018-08-20 | Stop reason: SDUPTHER

## 2018-07-23 NOTE — TELEPHONE ENCOUNTER
PCP: Indigo Solano. Rodolfo Shay MD    Last appt: 2/15/2018  Future Appointments  Date Time Provider Remi Moody   7/25/2018 9:40 AM LAB [facility] SCHED   8/1/2018 9:00 AM Indigo Solano.  Rodolfo Shay MD [facility] SCHED       Requested Prescriptions     Pending Prescriptions Disp Refills    omeprazole (PRILOSEC) 10 mg capsule [Pharmacy Med Name: OMEPRAZOLE DR 10 MG CAPSULE] 90 Cap 0     Sig: TAKE 1 CAPSULE BY MOUTH EVERY DAY       Prior labs and Blood pressures:  BP Readings from Last 3 Encounters:   02/15/18 144/82   02/13/18 132/75   08/31/17 133/66     Lab Results   Component Value Date/Time    Sodium 142 02/07/2018 11:57 AM    Potassium 4.6 02/07/2018 11:57 AM    Chloride 100 02/07/2018 11:57 AM    CO2 24 02/07/2018 11:57 AM    Anion gap 13 05/04/2010 10:53 AM    Glucose 119 (H) 02/07/2018 11:57 AM    BUN 14 02/07/2018 11:57 AM    Creatinine 0.71 02/07/2018 11:57 AM    BUN/Creatinine ratio 20 02/07/2018 11:57 AM    GFR est  02/07/2018 11:57 AM    GFR est non-AA 95 02/07/2018 11:57 AM    Calcium 9.4 02/07/2018 11:57 AM     Lab Results   Component Value Date/Time    Hemoglobin A1c 6.3 (H) 02/07/2018 11:57 AM     Lab Results   Component Value Date/Time    Cholesterol, total 180 08/28/2017 10:05 AM    HDL Cholesterol 46 08/28/2017 10:05 AM    LDL, calculated 95 08/28/2017 10:05 AM    VLDL, calculated 39 08/28/2017 10:05 AM    Triglyceride 193 (H) 08/28/2017 10:05 AM    CHOL/HDL Ratio 5.7 (H) 05/04/2010 10:53 AM     Lab Results   Component Value Date/Time    Vitamin D 25-Hydroxy 18.7 (L) 09/14/2011 09:30 AM    VITAMIN D, 25-HYDROXY 45.1 08/28/2017 10:05 AM       Lab Results   Component Value Date/Time    TSH 4.260 08/14/2013 09:28 AM

## 2018-08-01 ENCOUNTER — OFFICE VISIT (OUTPATIENT)
Dept: FAMILY MEDICINE CLINIC | Age: 58
End: 2018-08-01

## 2018-08-01 VITALS
RESPIRATION RATE: 18 BRPM | SYSTOLIC BLOOD PRESSURE: 119 MMHG | DIASTOLIC BLOOD PRESSURE: 79 MMHG | BODY MASS INDEX: 31.41 KG/M2 | WEIGHT: 177.3 LBS | OXYGEN SATURATION: 93 % | HEIGHT: 63 IN | TEMPERATURE: 99.1 F | HEART RATE: 93 BPM

## 2018-08-01 DIAGNOSIS — J06.9 UPPER RESPIRATORY TRACT INFECTION, UNSPECIFIED TYPE: Primary | ICD-10-CM

## 2018-08-01 LAB
S PYO AG THROAT QL: NEGATIVE
VALID INTERNAL CONTROL?: YES

## 2018-08-01 NOTE — PATIENT INSTRUCTIONS
1) An upper respiratory infection (URI) is an infection of the throat and nose. It is also known as \"the common cold\". 90% of these infections are caused by a virus. Viral infections do not respond to antibiotic treatment, only bacteria are killed by antibiotics. Therefore, supportive treatment is recommended to help with symptoms. Symptoms usually subside after 7-10 days (or longer if you smoke). If symptoms worsen or persist after 14 days, or if you have fever over 101.3, fever for 5 days or more, wheezing, or shortness of breath, please contact the office. To prevent URIs, wash hands frequently (epecially before and after eating - use hand  if you are in a public place.) Eat a healthy diet, get regular exercise and sufficient sleep. Reduce your exposure to cigarette smoke. If you need to cough or sneeze, use the crook of your arm to cover your mouth. Supportive Care    1) 650mg Tylenol every 8 hours as needed for sinus pain and discomfort. Do not exceed 3000 mg Tylenol per day. 2) Take a daily antihistamine to reduce symptoms such as sneezing, runny nose and itchy eyes. You can buy these over the counter (OTC) (no prescription needed) such as Claritin, Allegra or Zyrtec. Take this daily as it takes 3-4 weeks for the full therapeutic effect to take place. Follow directions on package. If symptoms of sneezing, coughing and runny nose are severe, you can try taking Benadryl at night before bed. However, Benadryl can cause drowsiness, so please use caution. Elderly people should not use Benadryl due to side effects and increase risk of falling. 3) OTC Robitussin or Delsym for cough relief. Follow directions on package. Do not exceed maximum dose. May cause drowsiness. If you have high blood pressure or kidney problems, avoid cough medicines that contain decongestants -- such as pseudoephedrine, ephedrine, phenylephrine, naphazoline and oxymetazoline.     4) Use an OTC decongestant nasal spray such as Flonase, Nasonex, or Rhinocort. These contain a steroid and will help reduce congestion. You can spray 2x in each nostril two times a day for 3-5 days. Use the opposite hand of the nostril you are spraying and look down at your toes when you administer the nasal spray. This ensures the spray is applied to the nasal tissue properly. If you use Afrin for nasal congestion, DO NOT use for more than 5 days (due to rebound congestion)     Saline nasal spray can be used daily and will help restore moisture to dry nasal passages, wash away allergens and can help prevent inflammation of the mucous membranes. 5) Mucinex (guaifenesin) helps thin mucus and may make it easier to clear it from head, throat and lungs. 6) Increase your fluid consumption, especially water. Eat a well-balanced diet and avoid dairy and sugar as these can increase or thicken congestion. 7) Warm salt water gargle can help alleviate sore throat (1 teaspoon in 8 oz warm water)    8) Honey is a natural cough suppressor - can take a teaspoon of honey for cough or mix with hot tea. Local honey can help inoculate against local pollen that causes allergic reactions. (It has to be local honey from our area. You can usually find local honey at farmers' markets.)     9) Humidify air, especially in bedroom at night, as it may help with nasal and throat dryness. Breathing in steam from a shower may help loosen mucus and soothe an irritated throat. 10) Get plenty of rest!    11) A warm soak in a bath can help ease muscle and body aches. Add 1 cup of epsom salt to water (Dr. Tiesha Lancaster and shantel is a good one- at [place] for around $6). Or you can put 1 cup of epsom salt in quart of warm water, soak a wash cloth in solution and apply to sore muscles.      12) Emergen C or [place] - vitamin supplements containing high doses of vitamin C, Vitamin B12 and B6 that can is marketed to help prevent or stop colds (although this has not been confirmed by scientific research)     If symptoms persist for more than 10 days or if you have a fever above 102, please call the office. Complications of URI include an infection of the skin around the eye and other infections. Watch for signs of fever, chills, body aches or any areas of red, warm, swollen and tender skin. Call immediately if you notice these signs. Upper Respiratory Infection (Cold): Care Instructions  Your Care Instructions    An upper respiratory infection, or URI, is an infection of the nose, sinuses, or throat. URIs are spread by coughs, sneezes, and direct contact. The common cold is the most frequent kind of URI. The flu and sinus infections are other kinds of URIs. Almost all URIs are caused by viruses. Antibiotics won't cure them. But you can treat most infections with home care. This may include drinking lots of fluids and taking over-the-counter pain medicine. You will probably feel better in 4 to 10 days. The doctor has checked you carefully, but problems can develop later. If you notice any problems or new symptoms, get medical treatment right away. Follow-up care is a key part of your treatment and safety. Be sure to make and go to all appointments, and call your doctor if you are having problems. It's also a good idea to know your test results and keep a list of the medicines you take. How can you care for yourself at home? · To prevent dehydration, drink plenty of fluids, enough so that your urine is light yellow or clear like water. Choose water and other caffeine-free clear liquids until you feel better. If you have kidney, heart, or liver disease and have to limit fluids, talk with your doctor before you increase the amount of fluids you drink. · Take an over-the-counter pain medicine, such as acetaminophen (Tylenol), ibuprofen (Advil, Motrin), or naproxen (Aleve). Read and follow all instructions on the label.   · Before you use cough and cold medicines, check the label. These medicines may not be safe for young children or for people with certain health problems. · Be careful when taking over-the-counter cold or flu medicines and Tylenol at the same time. Many of these medicines have acetaminophen, which is Tylenol. Read the labels to make sure that you are not taking more than the recommended dose. Too much acetaminophen (Tylenol) can be harmful. · Get plenty of rest.  · Do not smoke or allow others to smoke around you. If you need help quitting, talk to your doctor about stop-smoking programs and medicines. These can increase your chances of quitting for good. When should you call for help? Call 911 anytime you think you may need emergency care. For example, call if:    · You have severe trouble breathing.    Call your doctor now or seek immediate medical care if:    · You seem to be getting much sicker.     · You have new or worse trouble breathing.     · You have a new or higher fever.     · You have a new rash.    Watch closely for changes in your health, and be sure to contact your doctor if:    · You have a new symptom, such as a sore throat, an earache, or sinus pain.     · You cough more deeply or more often, especially if you notice more mucus or a change in the color of your mucus.     · You do not get better as expected. Where can you learn more? Go to http://candice-carina.info/. Enter B471 in the search box to learn more about \"Upper Respiratory Infection (Cold): Care Instructions. \"  Current as of: December 6, 2017  Content Version: 11.7  © 3863-8101 Healthwise, Incorporated. Care instructions adapted under license by [employer] (which disclaims liability or warranty for this information). If you have questions about a medical condition or this instruction, always ask your healthcare professional. [address] any warranty or liability for your use of this information.

## 2018-08-01 NOTE — PROGRESS NOTES
S: Selvin Goel is a 62 y.o. female who presents with runny nose, cough    Assessment/Plan:  1. Upper respiratory tract infection, unspecified type  Strep = negative  -instructions for supportive care given  -if sx persist past 8/6/18, will call in amoxicillin        HPI:  Just got back from [place] - generalized body aches  6/10 pain   Sx started: 7-10 days  + cough, productive cough - brown  Not worse at night  +SOB  wheezing  +fever/chills  +sore throat  nasal discharge  watery eyes    +HA  +sinus pressure  +ear pain/pressure    Relieving/aggravating factors:  +Hx of allergies    Social history:   Nutrition: appetite ok; Not drinking a lot of water, discussed getting 64+ oz daily     History   Smoking Status    Never Smoker   Smokeless Tobacco    Never Used     History   Alcohol Use No     History   Drug Use No       Review of Systems:  - Constitutional symptoms: + fatigue  - Cardiovascular: no chest pain or palpitations; +tighening in chest   - Gastrointestinal: no nausea or vomiting or ab pain    I reviewed the following:  Past Medical History:   Diagnosis Date    Advanced care planning/counseling discussion 5/24/16    Allergic rhinitis, cause unspecified 10/2003    Atypical squamous cell changes of undetermined significance (ASCUS) on vaginal cytology 2/13/2018    Breast mass, right 10/12/11    Right. Dr. Cynthia Romo. benign. .    Carpal tunnel syndrome 09/2012    bilaterally. Dr. Radha Webb.  Chest pain 03/2012    Dr. Kourtney Angulo    Elevated alkaline phosphatase level 05/16/04    125    Essential hypertension, benign 09/18/12    Fibrocystic breast changes     Heel spur 2010    Left. Dr. Rodger Da Silva    Herpes zoster 07/2003    Right chest.    Hyperglycemia 8/23/2012    Iron deficiency anemia 10/2003    Menorrhagia 2006    due to endometrial polyps. Dr. César Brady Other and unspecified hyperlipidemia 0792    Ovarian follicular cyst 78/92/30    bilaterally.       Positive FIT (fecal immunochemical test) 2/22/2018    Tennis elbow 09/2012    Right. Dr. Vee Conrad.  Trigger finger 09/2012    Left thumb. Right middle. Dr. Vee Conrad.  Unspecified vitamin D deficiency 04/2009        Current Outpatient Prescriptions   Medication Sig Dispense Refill    omeprazole (PRILOSEC) 10 mg capsule TAKE 1 CAPSULE BY MOUTH EVERY DAY 90 Cap 0    metFORMIN ER (GLUCOPHAGE XR) 500 mg tablet Take 1 Tab by mouth daily. 90 Tab 3    aspirin 81 mg chewable tablet Take 1 Tab by mouth daily. 30 Tab 0    atorvastatin (LIPITOR) 20 mg tablet Take 1 Tab by mouth daily. 30 Tab 11    losartan (COZAAR) 50 mg tablet TAKE ONE TABLET BY MOUTH ONE TIME DAILY 90 Tab 3    cholecalciferol, vitamin D3, (VITAMIN D3) 2,000 unit Tab Take 5,000 Int'l Units by mouth daily. Allergies   Allergen Reactions    Pcn [Penicillins] Hives    Lisinopril Cough     Dry cough    Nsaids (Non-Steroidal Anti-Inflammatory Drug) Other (comments)     Abdominal pain       O: VS:   Visit Vitals    /79 (BP 1 Location: Left arm, BP Patient Position: Sitting)    Pulse 93    Temp 99.1 °F (37.3 °C) (Oral)    Resp 18    Ht 5' 3\" (1.6 m)    Wt 177 lb 4.8 oz (80.4 kg)    LMP 03/04/2012    SpO2 93%    BMI 31.41 kg/m2       Data Reviewed:  Rapid Strep: negatvie    GENERAL: Jamel Marcus is in no acute distress. Non-toxic. HEAD:  Normocephalic. Atraumatic. Non tender sinuses x 4. EYE: PERRLA. EOMs intact. Sclera anicteric without injection. No drainage or discharge. EARS: Hearing intact bilaterally. External ear canals normal without evidence of blood or swelling. Bilateral TM's intact, pearly grey with landmarks visible. No erythema or effusion. NOSE: Patent. Nasal turbinates pink. No polyps noted. Erythema. No discharge. MOUTH: mucous membranes pink and moist. Posterior pharynx normal with cobblestone appearance. Mild erythema, no white exudate or obstruction. NECK: supple. Midline trachea.   No anterior cervical lymphadenopathy noted. RESP: Breath sounds are symmetrical bilaterally. Unlabored without SOB. Speaking in full sentences. Clear to auscultation bilaterally anteriorly and posteriorly. No wheezes. No rales or rhonchi. CV: normal rate. Regular rhythm. S1, S2 audible. No murmur noted. No rubs, clicks or gallops noted. ABDOMEN: Soft and nondistended. No tenderness on palpation. SKIN: Skin is warm and dry. Turgor is normal.   ______________________________________________________________________  Patient education was done. Advised on nutrition, tobacco, and good handwashing. Counseling included discussion of diagnosis, differentials, treatment options, prescribed treatment, warning signs and follow up. Medication risks/benefits, costs, interactions and alternatives discussed with patient.      Patient verbalized understanding and agreed to plan of care. If you are not feeling better or you begin to feel worse or develop new symptoms in 3-4 days please call. Follow up in 1-2 weeks if symptoms persist or progress.

## 2018-08-01 NOTE — PROGRESS NOTES
Modesta Floreskulwinder  Identified pt with two pt identifiers(name and ). Chief Complaint   Patient presents with    Cold Symptoms     rm 10/ coughing, runny nose, brown phlegm       1. Have you been to the ER, urgent care clinic since your last visit? no Hospitalized since your last visit? No    2. Have you seen or consulted any other health care providers outside of the [address] [place] [address] since your last visit? no  Include any pap smears or colon screening. No    Today's provider has been notified of reason for visit, vitals and flowsheets obtained on patients.        Reviewed record In preparation for visit, huddled with provider and have obtained necessary documentation      Health Maintenance Due   Topic    EYE EXAM RETINAL OR DILATED Q1     BREAST CANCER SCRN MAMMOGRAM     Influenza Age 5 to Adult        Wt Readings from Last 3 Encounters:   18 177 lb 4.8 oz (80.4 kg)   02/15/18 176 lb (79.8 kg)   18 178 lb (80.7 kg)     Temp Readings from Last 3 Encounters:   18 99.1 °F (37.3 °C) (Oral)   02/15/18 98.2 °F (36.8 °C) (Oral)   18 97.3 °F (36.3 °C) (Oral)     BP Readings from Last 3 Encounters:   18 119/79   02/15/18 144/82   18 132/75     Pulse Readings from Last 3 Encounters:   18 93   02/15/18 84   18 77     Vitals:    18 1158   BP: 119/79   Pulse: 93   Resp: 18   Temp: 99.1 °F (37.3 °C)   TempSrc: Oral   SpO2: 93%   Weight: 177 lb 4.8 oz (80.4 kg)   Height: 5' 3\" (1.6 m)   PainSc:   5   PainLoc: Generalized   LMP: 2012         Learning Assessment:  :     Learning Assessment 2018 7/10/2015   PRIMARY LEARNER Patient Patient   HIGHEST LEVEL OF EDUCATION - PRIMARY LEARNER  - 2 YEARS OF COLLEGE   BARRIERS PRIMARY LEARNER - NONE   CO-LEARNER CAREGIVER - No   PRIMARY LANGUAGE ENGLISH OTHER (COMMENT)   LEARNER PREFERENCE PRIMARY VIDEOS DEMONSTRATION     - LISTENING   ANSWERED BY patient Patient   RELATIONSHIP SELF SELF       Depression Screening:  : PHQ over the last two weeks 8/1/2018   Little interest or pleasure in doing things Not at all   Feeling down, depressed, irritable, or hopeless Not at all   Total Score PHQ 2 0       Fall Risk Assessment:  :     No flowsheet data found. Abuse Screening:  :     Abuse Screening Questionnaire 8/1/2018   Do you ever feel afraid of your partner? N   Are you in a relationship with someone who physically or mentally threatens you? N   Is it safe for you to go home? Y       ADL Screening:  :     No flowsheet data found. Medication reconciliation up to date and corrected with patient at this time.

## 2018-08-01 NOTE — MR AVS SNAPSHOT
[address] [facility] 
 
 
 [address] 
[address] [place] [address] 
[phone] Patient: Linus Duron MRN: [id number] Visit Information Date & Time Provider Department Dept. Phone Encounter #  
 8/1/2018 12:00 PM Pedro Trujillo NP [facility] [phone] [id number] Follow-up Instructions Return if symptoms worsen or fail to improve. Your Appointments 8/23/2018  3:00 PM  
COMPLETE 40 with Fiona Power 28 ([address]) Appt Note: complete physical exam and lab follow up; complete physical exam and lab follow up  
 [address] 
[address] [facility] [address]  
[phone]  
  
   
 [address] [facility] [address] [facility] [address] [facility] Upcoming Health Maintenance Date Due  
 EYE EXAM RETINAL OR DILATED Q1 5/28/1970 BREAST CANCER SCRN MAMMOGRAM 5/15/2018 Influenza Age 5 to Adult 8/1/2018 HEMOGLOBIN A1C Q6M 1/30/2019 FOOT EXAM Q1 2/13/2019 MICROALBUMIN Q1 2/13/2019 FOBT Q 1 YEAR AGE 50-75 2/14/2019 DTaP/Tdap/Td series (2 - Td) 4/15/2019 LIPID PANEL Q1 7/30/2019 PAP AKA CERVICAL CYTOLOGY 2/15/2023 Allergies as of 8/1/2018  Review Complete On: 8/1/2018 By: Pedro Trujillo NP Severity Noted Reaction Type Reactions Pcn [Penicillins] High 04/16/2010    Hives Lisinopril  01/23/2013   Side Effect Cough Dry cough Nsaids (Non-steroidal Anti-inflammatory Drug)  12/03/2012    Other (comments) Abdominal pain Current Immunizations  Reviewed on 1/30/2017 Name Date Influenza Vaccine 10/11/2017, 10/1/2016, 10/11/2013 Influenza Vaccine (Quad) PF 10/7/2014 Influenza Vaccine Split 10/15/2012 Pneumococcal Polysaccharide (PPSV-23) 8/31/2017 TDAP Vaccine 4/15/2009 Not reviewed this visit You Were Diagnosed With   
  
 Codes Comments Upper respiratory tract infection, unspecified type    -  Primary ICD-10-CM: J06.9 ICD-9-CM: 465.9 Vitals BP Pulse Temp Resp Height(growth percentile) Weight(growth percentile) 119/79 (BP 1 Location: Left arm, BP Patient Position: Sitting) 93 99.1 °F (37.3 °C) (Oral) 18 5' 3\" (1.6 m) 177 lb 4.8 oz (80.4 kg) LMP SpO2 BMI OB Status Smoking Status 03/04/2012 93% 31.41 kg/m2 Postmenopausal Never Smoker Vitals History BMI and BSA Data Body Mass Index Body Surface Area  
 31.41 kg/m 2 1.89 m 2 Preferred Pharmacy Pharmacy Name Phone [facility] [address] [facility] [place], [address] [phone] Your Updated Medication List  
  
   
This list is accurate as of 8/1/18 12:23 PM.  Always use your most recent med list.  
  
  
  
  
 aspirin 81 mg chewable tablet Take 1 Tab by mouth daily. atorvastatin 20 mg tablet Commonly known as:  LIPITOR Take 1 Tab by mouth daily. losartan 50 mg tablet Commonly known as:  COZAAR  
TAKE ONE TABLET BY MOUTH ONE TIME DAILY  
  
 metFORMIN  mg tablet Commonly known as:  GLUCOPHAGE XR Take 1 Tab by mouth daily. omeprazole 10 mg capsule Commonly known as:  PRILOSEC  
TAKE 1 CAPSULE BY MOUTH EVERY DAY  
  
 VITAMIN D3 2,000 unit Tab Generic drug:  cholecalciferol (vitamin D3) Take 5,000 Int'l Units by mouth daily. Follow-up Instructions Return if symptoms worsen or fail to improve. Patient Instructions 1) An upper respiratory infection (URI) is an infection of the throat and nose. It is also known as \"the common cold\". 90% of these infections are caused by a virus. Viral infections do not respond to antibiotic treatment, only bacteria are killed by antibiotics. Therefore, supportive treatment is recommended to help with symptoms. Symptoms usually subside after 7-10 days (or longer if you smoke).   If symptoms worsen or persist after 14 days, or if you have fever over 101.3, fever for 5 days or more, wheezing, or shortness of breath, please contact the office. To prevent URIs, wash hands frequently (epecially before and after eating - use hand  if you are in a public place.) Eat a healthy diet, get regular exercise and sufficient sleep. Reduce your exposure to cigarette smoke. If you need to cough or sneeze, use the crook of your arm to cover your mouth. Supportive Care 1) 650mg Tylenol every 8 hours as needed for sinus pain and discomfort. Do not exceed 3000 mg Tylenol per day. 2) Take a daily antihistamine to reduce symptoms such as sneezing, runny nose and itchy eyes. You can buy these over the counter (OTC) (no prescription needed) such as Claritin, Allegra or Zyrtec. Take this daily as it takes 3-4 weeks for the full therapeutic effect to take place. Follow directions on package. If symptoms of sneezing, coughing and runny nose are severe, you can try taking Benadryl at night before bed. However, Benadryl can cause drowsiness, so please use caution. Elderly people should not use Benadryl due to side effects and increase risk of falling. 3) OTC Robitussin or Delsym for cough relief. Follow directions on package. Do not exceed maximum dose. May cause drowsiness. If you have high blood pressure or kidney problems, avoid cough medicines that contain decongestants  such as pseudoephedrine, ephedrine, phenylephrine, naphazoline and oxymetazoline. 4) Use an OTC decongestant nasal spray such as Flonase, Nasonex, or Rhinocort. These contain a steroid and will help reduce congestion. You can spray 2x in each nostril two times a day for 3-5 days. Use the opposite hand of the nostril you are spraying and look down at your toes when you administer the nasal spray. This ensures the spray is applied to the nasal tissue properly. If you use Afrin for nasal congestion, DO NOT use for more than 5 days (due to rebound congestion) Saline nasal spray can be used daily and will help restore moisture to dry nasal passages, wash away allergens and can help prevent inflammation of the mucous membranes. 5) Mucinex (guaifenesin) helps thin mucus and may make it easier to clear it from head, throat and lungs. 6) Increase your fluid consumption, especially water. Eat a well-balanced diet and avoid dairy and sugar as these can increase or thicken congestion. 7) Warm salt water gargle can help alleviate sore throat (1 teaspoon in 8 oz warm water) 8) Honey is a natural cough suppressor - can take a teaspoon of honey for cough or mix with hot tea. Local honey can help inoculate against local pollen that causes allergic reactions. (It has to be local honey from our area. You can usually find local honey at [employer].) 9) Humidify air, especially in bedroom at night, as it may help with nasal and throat dryness. Breathing in steam from a shower may help loosen mucus and soothe an irritated throat. 10) Get plenty of rest! 11) A warm soak in a bath can help ease muscle and body aches. Add 1 cup of epsom salt to water (Dr. Becki Conner and spearmint is a good one- at [facility] for around $6). Or you can put 1 cup of epsom salt in quart of warm water, soak a wash cloth in solution and apply to sore muscles. 12) Emergen C or [place] - vitamin supplements containing high doses of vitamin C, Vitamin B12 and B6 that can is marketed to help prevent or stop colds (although this has not been confirmed by scientific research) If symptoms persist for more than 10 days or if you have a fever above 102, please call the office. Complications of URI include an infection of the skin around the eye and other infections. Watch for signs of fever, chills, body aches or any areas of red, warm, swollen and tender skin. Call immediately if you notice these signs. Upper Respiratory Infection (Cold): Care Instructions Your Care Instructions An upper respiratory infection, or URI, is an infection of the nose, sinuses, or throat. URIs are spread by coughs, sneezes, and direct contact. The common cold is the most frequent kind of URI. The flu and sinus infections are other kinds of URIs. Almost all URIs are caused by viruses. Antibiotics won't cure them. But you can treat most infections with home care. This may include drinking lots of fluids and taking over-the-counter pain medicine. You will probably feel better in 4 to 10 days. The doctor has checked you carefully, but problems can develop later. If you notice any problems or new symptoms, get medical treatment right away. Follow-up care is a key part of your treatment and safety. Be sure to make and go to all appointments, and call your doctor if you are having problems. It's also a good idea to know your test results and keep a list of the medicines you take. How can you care for yourself at home? · To prevent dehydration, drink plenty of fluids, enough so that your urine is light yellow or clear like water. Choose water and other caffeine-free clear liquids until you feel better. If you have kidney, heart, or liver disease and have to limit fluids, talk with your doctor before you increase the amount of fluids you drink. · Take an over-the-counter pain medicine, such as acetaminophen (Tylenol), ibuprofen (Advil, Motrin), or naproxen (Aleve). Read and follow all instructions on the label. · Before you use cough and cold medicines, check the label. These medicines may not be safe for young children or for people with certain health problems. · Be careful when taking over-the-counter cold or flu medicines and Tylenol at the same time. Many of these medicines have acetaminophen, which is Tylenol.  Read the labels to make sure that you are not taking more than the recommended dose. Too much acetaminophen (Tylenol) can be harmful. · Get plenty of rest. 
· Do not smoke or allow others to smoke around you. If you need help quitting, talk to your doctor about stop-smoking programs and medicines. These can increase your chances of quitting for good. When should you call for help? Call 911 anytime you think you may need emergency care. For example, call if: 
  · You have severe trouble breathing.  
 Call your doctor now or seek immediate medical care if: 
  · You seem to be getting much sicker.  
  · You have new or worse trouble breathing.  
  · You have a new or higher fever.  
  · You have a new rash.  
 Watch closely for changes in your health, and be sure to contact your doctor if: 
  · You have a new symptom, such as a sore throat, an earache, or sinus pain.  
  · You cough more deeply or more often, especially if you notice more mucus or a change in the color of your mucus.  
  · You do not get better as expected. Where can you learn more? Go to http://candice-carina.info/. Enter R798 in the search box to learn more about \"Upper Respiratory Infection (Cold): Care Instructions. \" Current as of: December 6, 2017 Content Version: 11.7 © 1579-5460 [employer]. Care instructions adapted under license by [employer] (which disclaims liability or warranty for this information). If you have questions about a medical condition or this instruction, always ask your healthcare professional. [place] [address] any warranty or liability for your use of this information. Introducing [facility]! Dear Ildefonso Nicole: Thank you for requesting a [employer] account. Our records indicate that you have previously registered for a [employer] account but its currently inactive. Please call our [employer] support line at [phone]. Additional Information If you have questions, please visit the Frequently Asked Questions section of the [employer] website at [email or web address] Remember, [employer] is NOT to be used for urgent needs. For medical emergencies, dial 911. Now available from your iPhone and Android! Please provide this summary of care documentation to your next provider. Your primary care clinician is listed as Lee Carson. If you have any questions after today's visit, please call [phone].

## 2018-08-06 RX ORDER — LOSARTAN POTASSIUM 50 MG/1
TABLET ORAL
Qty: 90 TAB | Refills: 1 | Status: SHIPPED | OUTPATIENT
Start: 2018-08-06 | End: 2018-09-05 | Stop reason: SDUPTHER

## 2018-08-06 NOTE — TELEPHONE ENCOUNTER
PCP: Mary Lou Block MD    Last appt: 7/30/2018  Future Appointments  Date Time Provider Remi Moody   8/23/2018 3:00 PM Mary Lou Block MD [facility]       Requested Prescriptions     Pending Prescriptions Disp Refills    losartan (COZAAR) 50 mg tablet [Pharmacy Med Name: LOSARTAN POTASSIUM 50 MG TAB] 90 Tab 0     Sig: TAKE 1 TABLET BY MOUTH EVERY DAY       Prior labs and Blood pressures:  BP Readings from Last 3 Encounters:   08/01/18 119/79   02/15/18 144/82   02/13/18 132/75     Lab Results   Component Value Date/Time    Sodium 140 07/30/2018 10:50 AM    Potassium 4.4 07/30/2018 10:50 AM    Chloride 100 07/30/2018 10:50 AM    CO2 26 07/30/2018 10:50 AM    Anion gap 13 05/04/2010 10:53 AM    Glucose 139 (H) 07/30/2018 10:50 AM    BUN 16 07/30/2018 10:50 AM    Creatinine 0.65 07/30/2018 10:50 AM    BUN/Creatinine ratio 25 (H) 07/30/2018 10:50 AM    GFR est  07/30/2018 10:50 AM    GFR est non-AA 98 07/30/2018 10:50 AM    Calcium 9.5 07/30/2018 10:50 AM     Lab Results   Component Value Date/Time    Hemoglobin A1c 6.4 (H) 07/30/2018 10:50 AM     Lab Results   Component Value Date/Time    Cholesterol, total 187 07/30/2018 10:50 AM    HDL Cholesterol 41 07/30/2018 10:50 AM    LDL, calculated 94 07/30/2018 10:50 AM    VLDL, calculated 52 (H) 07/30/2018 10:50 AM    Triglyceride 258 (H) 07/30/2018 10:50 AM    CHOL/HDL Ratio 5.7 (H) 05/04/2010 10:53 AM     Lab Results   Component Value Date/Time    Vitamin D 25-Hydroxy 18.7 (L) 09/14/2011 09:30 AM    VITAMIN D, 25-HYDROXY 40.2 07/30/2018 10:50 AM       Lab Results   Component Value Date/Time    TSH 4.260 08/14/2013 09:28 AM

## 2018-08-20 DIAGNOSIS — E78.5 DYSLIPIDEMIA: ICD-10-CM

## 2018-08-20 NOTE — TELEPHONE ENCOUNTER
Requested Prescriptions     Pending Prescriptions Disp Refills    atorvastatin (LIPITOR) 20 mg tablet 30 Tab 11     Sig: Take 1 Tab by mouth daily.  omeprazole (PRILOSEC) 10 mg capsule 90 Cap 0      Request for a 90 day supply.

## 2018-08-20 NOTE — TELEPHONE ENCOUNTER
PCP: General Mcdonald. Trenton Gutierrez MD    Last appt: 8/1/2018  Future Appointments  Date Time Provider Remi Moody   8/23/2018 3:00 PM General [facility]. Trenton Gutierrez MD [facility]       Requested Prescriptions     Pending Prescriptions Disp Refills    atorvastatin (LIPITOR) 20 mg tablet 30 Tab 11     Sig: Take 1 Tab by mouth daily.     omeprazole (PRILOSEC) 10 mg capsule 90 Cap 0       Prior labs and Blood pressures:  BP Readings from Last 3 Encounters:   08/01/18 119/79   02/15/18 144/82   02/13/18 132/75     Lab Results   Component Value Date/Time    Sodium 140 07/30/2018 10:50 AM    Potassium 4.4 07/30/2018 10:50 AM    Chloride 100 07/30/2018 10:50 AM    CO2 26 07/30/2018 10:50 AM    Anion gap 13 05/04/2010 10:53 AM    Glucose 139 (H) 07/30/2018 10:50 AM    BUN 16 07/30/2018 10:50 AM    Creatinine 0.65 07/30/2018 10:50 AM    BUN/Creatinine ratio 25 (H) 07/30/2018 10:50 AM    GFR est  07/30/2018 10:50 AM    GFR est non-AA 98 07/30/2018 10:50 AM    Calcium 9.5 07/30/2018 10:50 AM     Lab Results   Component Value Date/Time    Hemoglobin A1c 6.4 (H) 07/30/2018 10:50 AM     Lab Results   Component Value Date/Time    Cholesterol, total 187 07/30/2018 10:50 AM    HDL Cholesterol 41 07/30/2018 10:50 AM    LDL, calculated 94 07/30/2018 10:50 AM    VLDL, calculated 52 (H) 07/30/2018 10:50 AM    Triglyceride 258 (H) 07/30/2018 10:50 AM    CHOL/HDL Ratio 5.7 (H) 05/04/2010 10:53 AM     Lab Results   Component Value Date/Time    Vitamin D 25-Hydroxy 18.7 (L) 09/14/2011 09:30 AM    VITAMIN D, 25-HYDROXY 40.2 07/30/2018 10:50 AM       Lab Results   Component Value Date/Time    TSH 4.260 08/14/2013 09:28 AM

## 2018-08-24 RX ORDER — OMEPRAZOLE 10 MG/1
CAPSULE, DELAYED RELEASE ORAL
Qty: 90 CAP | Refills: 3 | Status: SHIPPED | OUTPATIENT
Start: 2018-08-24 | End: 2019-02-27 | Stop reason: SDUPTHER

## 2018-08-24 RX ORDER — ATORVASTATIN CALCIUM 20 MG/1
20 TABLET, FILM COATED ORAL DAILY
Qty: 90 TAB | Refills: 3 | Status: SHIPPED | OUTPATIENT
Start: 2018-08-24 | End: 2018-09-07 | Stop reason: SDUPTHER

## 2018-09-05 NOTE — TELEPHONE ENCOUNTER
PCP: Farhad Romano MD    Last appt: 8/1/2018  Future Appointments  Date Time Provider Remi Moody   9/7/2018 1:20 PM Farhad Romano MD [facility]       Requested Prescriptions     Pending Prescriptions Disp Refills    losartan (COZAAR) 50 mg tablet 90 Tab 1     Sig: TAKE 1 TABLET BY MOUTH EVERY DAY       Prior labs and Blood pressures:  BP Readings from Last 3 Encounters:   08/01/18 119/79   02/15/18 144/82   02/13/18 132/75     Lab Results   Component Value Date/Time    Sodium 140 07/30/2018 10:50 AM    Potassium 4.4 07/30/2018 10:50 AM    Chloride 100 07/30/2018 10:50 AM    CO2 26 07/30/2018 10:50 AM    Anion gap 13 05/04/2010 10:53 AM    Glucose 139 (H) 07/30/2018 10:50 AM    BUN 16 07/30/2018 10:50 AM    Creatinine 0.65 07/30/2018 10:50 AM    BUN/Creatinine ratio 25 (H) 07/30/2018 10:50 AM    GFR est  07/30/2018 10:50 AM    GFR est non-AA 98 07/30/2018 10:50 AM    Calcium 9.5 07/30/2018 10:50 AM     Lab Results   Component Value Date/Time    Hemoglobin A1c 6.4 (H) 07/30/2018 10:50 AM     Lab Results   Component Value Date/Time    Cholesterol, total 187 07/30/2018 10:50 AM    HDL Cholesterol 41 07/30/2018 10:50 AM    LDL, calculated 94 07/30/2018 10:50 AM    VLDL, calculated 52 (H) 07/30/2018 10:50 AM    Triglyceride 258 (H) 07/30/2018 10:50 AM    CHOL/HDL Ratio 5.7 (H) 05/04/2010 10:53 AM     Lab Results   Component Value Date/Time    Vitamin D 25-Hydroxy 18.7 (L) 09/14/2011 09:30 AM    VITAMIN D, 25-HYDROXY 40.2 07/30/2018 10:50 AM       Lab Results   Component Value Date/Time    TSH 4.260 08/14/2013 09:28 AM

## 2018-09-05 NOTE — TELEPHONE ENCOUNTER
Requested Prescriptions     Pending Prescriptions Disp Refills    losartan (COZAAR) 50 mg tablet 90 Tab 1     [facility] sent a request for a 90 days supply.

## 2018-09-07 ENCOUNTER — OFFICE VISIT (OUTPATIENT)
Dept: FAMILY MEDICINE CLINIC | Age: 58
End: 2018-09-07

## 2018-09-07 VITALS
OXYGEN SATURATION: 98 % | RESPIRATION RATE: 18 BRPM | HEART RATE: 74 BPM | DIASTOLIC BLOOD PRESSURE: 77 MMHG | WEIGHT: 177.6 LBS | TEMPERATURE: 97.8 F | HEIGHT: 65 IN | SYSTOLIC BLOOD PRESSURE: 141 MMHG | BODY MASS INDEX: 29.59 KG/M2

## 2018-09-07 DIAGNOSIS — Z00.00 WELL WOMAN EXAM WITHOUT GYNECOLOGICAL EXAM: Primary | ICD-10-CM

## 2018-09-07 DIAGNOSIS — R19.5 POSITIVE FIT (FECAL IMMUNOCHEMICAL TEST): ICD-10-CM

## 2018-09-07 DIAGNOSIS — I10 ESSENTIAL HYPERTENSION: ICD-10-CM

## 2018-09-07 DIAGNOSIS — E11.9 TYPE 2 DIABETES MELLITUS WITHOUT COMPLICATION, WITHOUT LONG-TERM CURRENT USE OF INSULIN (HCC): ICD-10-CM

## 2018-09-07 DIAGNOSIS — K76.0 FATTY LIVER: ICD-10-CM

## 2018-09-07 DIAGNOSIS — E78.1 HIGH TRIGLYCERIDES: ICD-10-CM

## 2018-09-07 PROBLEM — R87.620 ATYPICAL SQUAMOUS CELL CHANGES OF UNDETERMINED SIGNIFICANCE (ASCUS) ON VAGINAL CYTOLOGY: Status: RESOLVED | Noted: 2018-02-13 | Resolved: 2018-09-07

## 2018-09-07 RX ORDER — ATORVASTATIN CALCIUM 40 MG/1
40 TABLET, FILM COATED ORAL DAILY
Qty: 90 TAB | Refills: 3 | Status: SHIPPED | OUTPATIENT
Start: 2018-09-07 | End: 2019-02-27 | Stop reason: SDUPTHER

## 2018-09-07 NOTE — PROGRESS NOTES
PATIENT COMMENTS: Pt states that at the end of the day she has pain and at night Poli Mancuso Markkulwinder  Identified pt with two pt identifiers(name and ). Chief Complaint Patient presents with  Follow-up Rm 8: pt return for 6month check up diabetes  Immunization/Injection  
  pt wants a flu vaccine and shingrix vaccine 1. Have you been to the ER, urgent care clinic since your last visit? Hospitalized since your last visit? NO 
 
2. Have you seen or consulted any other health care providers outside of the [address] [place] [address] since your last visit? Include any pap smears or colon screening. YES pt has an appt with mammogram on Monday Sept 10, Advance Care Planning In the event something were to happen to you and you were unable to speak on your behalf, do you have an Advance Directive/ Living Will in place stating your wishes? no If yes, do we have a copy on file NO If no, would you like information YES pt has the information My Chart My chart gives you direct online access to portions of the electronic medical record (EMR) where your doctor stores your health information (ie, lab results, appointment information, medications, immunizations, and more. It is free. Would you like to set up your my chart? YES pt wants to set it up today Today's provider has been notified of reason for visit, vitals and flowsheets obtained on patients. Reviewed record In preparation for visit, huddled with provider and have obtained necessary documentation Health Maintenance Due Topic  
 EYE EXAM RETINAL OR DILATED Q1   
 BREAST CANCER SCRN MAMMOGRAM   
 Influenza Age 5 to Adult   
 
 
[unfilled] Wt Readings from Last 3 Encounters:  
18 177 lb 9.6 oz (80.6 kg) 18 177 lb 4.8 oz (80.4 kg) 02/15/18 176 lb (79.8 kg) Temp Readings from Last 3 Encounters:  
18 97.8 °F (36.6 °C) (Temporal) 18 99.1 °F (37.3 °C) (Oral) 02/15/18 98.2 °F (36.8 °C) (Oral) BP Readings from Last 3 Encounters:  
09/07/18 141/77  
08/01/18 119/79  
02/15/18 144/82 Pulse Readings from Last 3 Encounters:  
09/07/18 74  
08/01/18 93  
02/15/18 84 Vitals:  
 09/07/18 1341 BP: 141/77 Pulse: 74 Resp: 18 Temp: 97.8 °F (36.6 °C) TempSrc: Temporal  
SpO2: 98% Weight: 177 lb 9.6 oz (80.6 kg) Height: 5' 5.25\" (1.657 m) PainSc:   0 - No pain LMP: 03/04/2012 Learning Assessment: 
:  
 
Learning Assessment 8/1/2018 7/10/2015 PRIMARY LEARNER Patient Patient HIGHEST LEVEL OF EDUCATION - PRIMARY LEARNER  - 2 YEARS OF COLLEGE  
BARRIERS PRIMARY LEARNER - NONE  
CO-LEARNER CAREGIVER - No  
PRIMARY LANGUAGE ENGLISH OTHER (COMMENT) LEARNER PREFERENCE PRIMARY VIDEOS DEMONSTRATION  
  - LISTENING  
ANSWERED BY patient Patient RELATIONSHIP SELF SELF Depression Screening: 
:  
 
PHQ over the last two weeks 8/1/2018 Little interest or pleasure in doing things Not at all Feeling down, depressed, irritable, or hopeless Not at all Total Score PHQ 2 0 Fall Risk Assessment: 
:  
 
Fall Risk Assessment, last 12 mths 9/7/2018 Able to walk? Yes Fall in past 12 months? No  
 
 
Abuse Screening: 
:  
 
Abuse Screening Questionnaire 8/1/2018 Do you ever feel afraid of your partner? Joellepriscilajessa Hankins Are you in a relationship with someone who physically or mentally threatens you? Joellekelsy Cr Is it safe for you to go home? Y  
 
 
ADL Screening: 
:  
 
ADL Assessment 9/7/2018 Feeding yourself No Help Needed Getting from bed to chair No Help Needed Getting dressed No Help Needed Bathing or showering No Help Needed Walk across the room (includes cane/walker) No Help Needed Using the telphone No Help Needed Taking your medications No Help Needed Preparing meals No Help Needed Managing money (expenses/bills) No Help Needed Moderately strenuous housework (laundry) No Help Needed Shopping for personal items (toiletries/medicines) No Help Needed Shopping for groceries No Help Needed Driving No Help Needed Climbing a flight of stairs No Help Needed Getting to places beyond walking distances No Help Needed Medication reconciliation up to date and corrected with patient at this time.

## 2018-09-07 NOTE — PATIENT INSTRUCTIONS
TODAY, please go to: 
 Flu vaccine CHECK OUT - If you received a referral, Show the  Please schedule the following appointments at [facility] OUT: 
· Follow up with Dr. Tabitha Hodge, or Arlene for Diabetes and blood pressure in March 2019 Today's Plan: 
Call to schedule colonoscopy with GI Schedule to see the Liver doctor, Dr. Yonis Hanley Start new dose of lipitor 40mg. Continue metformin at same dose Schedule to see nutrition 
_____________________ Consider signing up for Formotus to receive your results electronically.

## 2018-09-07 NOTE — PROGRESS NOTES
[facility] Visit 09/07/2018 Patient ID: Rogelia Kayser is a 62 y.o. female. Assessment/Plan:   
Diagnoses and all orders for this visit: 
 
1. Well woman exam without gynecological exam 
 
2. Positive FIT (fecal immunochemical test) [facility] 3. Type 2 diabetes mellitus without complication, without long-term current use of insulin ([address] 
-     REFERRAL TO NUTRITION 4. Fatty liver -     [facility] [address]  5. High triglycerides 
-     REFERRAL TO NUTRITION 6. Essential hypertension 
-     REFERRAL TO NUTRITION 7. BMI 29.0-29.9,adult 
-     REFERRAL TO NUTRITION Other orders -     INFLUENZA VACCINE (CCIIV4 VACCINE ANTIBIO FREE 0.5 ML) 
-     varicella-zoster recombinant, PF, (SHINGRIX, PF,) 50 mcg/0.5 mL susr injection; 0.5 mL by IntraMUSCular route once for 1 dose. Administer second dose 2-6 months after first dose. -     atorvastatin (LIPITOR) 40 mg tablet; Take 1 Tab by mouth daily. #Healthcare maintenance/ Preventive: 
- screened for alcohol abuse, counseled if heavy use  
- screened for tobacco use, counseled if active 
- HM reviewed and updated as noted 
- given recommendations for diet and exercise, as well as dental and vision screenings 
- reviewed sexual history 
- depression screening up to date 
- screened for domestic violence - Labs reviewed in detail as noted 
 
see GI d/t positive FIT Increase lipitor Continue metformin See nutrition See Hepatology Encouraged to see podiatry for heel pain See patient instructions for more. Counselled pt on Patient health concerns and plans. Patient was offered a choice/choices in the treatment plan today. Reviewed return precautions as appropriate. Patient expresses understanding of the plan and agrees with recommendations. Patient Instructions TODAY, please go to: 
 Flu vaccine CHECK OUT - If you received a referral, Show the  Please schedule the following appointments at [facility] OUT: 
· Follow up with Dr. Valentino Freed, or Arlene for Diabetes and blood pressure in 2019 Today's Plan: 
Call to schedule colonoscopy with GI Schedule to see the Liver doctor, Dr. Mendez Waggoner Start new dose of lipitor 40mg. Continue metformin at same dose Schedule to see nutrition 
_____________________ Consider signing up for Brammo to receive your results electronically. Subjective: HPI: 
Elly Son is a 62 y.o. female being seen for: Chief Complaint Patient presents with  Follow-up Rm 8: pt return for 6month check up diabetes  Immunization/Injection  
  pt wants a flu vaccine and shingrix vaccine Complete Physical today Past medical history, surgical history, social history, family history, medications, allergies reviewed and updated. · Domestic violence: denies  
 reports that she has never smoked. She has never used smokeless tobacco. 
 reports that she does not drink alcohol. Social History Social History Narrative Lives with  and son. · OB Hx/Menstrual Hx/Sexualhx: 
 reports that she currently engages in sexual activity and has had male partners. She reports using the following method of birth control/protection: Surgical. 
OB History  Para Term  AB Living 5 4   1 4 SAB TAB Ectopic Molar Multiple Live Births # Outcome Date GA Lbr Josh/2nd Weight Sex Delivery Anes PTL Lv  
5 Para           
4 AB           
3 Para     M      
2 Para     F      
1 Para     F Obstetric Comments Postmenopausal. LMP around 47 yo  
H/o abnl pap: yes Paps with PCP. Patient's last menstrual period was 2012. Lab review · Labs reviewed in detail · 10 year ascvd 9.8% · Lifetime ascvd risk 50% Health Maintenance Health Maintenance Due Topic Date Due  
  EYE EXAM RETINAL OR DILATED Q1  05/28/1970  Shingrix Vaccine Age 50> (1 of 2) 05/28/2010  BREAST CANCER SCRN MAMMOGRAM  05/15/2018  Influenza Age 5 to Adult  08/01/2018 Immunization History Administered Date(s) Administered  Influenza Vaccine 10/11/2013, 10/01/2016, 10/11/2017  Influenza Vaccine (Mdck Quadrivalent) 09/07/2018  Influenza Vaccine (Quad) PF 10/07/2014  Influenza Vaccine Split 10/15/2012  Pneumococcal Polysaccharide (PPSV-23) 08/31/2017  TDAP Vaccine 04/15/2009 · Depression: PHQ over the last two weeks 8/1/2018 Little interest or pleasure in doing things Not at all Feeling down, depressed, irritable, or hopeless Not at all Total Score PHQ 2 0  
 
· Hepatitis C (pt born [birth year]): Lab Results Component Value Date/Time Hep C Virus Ab <0.1 05/23/2017 09:49 AM  
 
· A1C:  
  
Lab Results Component Value Date/Time Hemoglobin A1c 6.4 (H) 07/30/2018 10:50 AM  
 
· Lipids: 
Lab Results Component Value Date/Time Cholesterol, total 187 07/30/2018 10:50 AM  
 HDL Cholesterol 41 07/30/2018 10:50 AM  
 LDL, calculated 94 07/30/2018 10:50 AM  
 VLDL, calculated 52 (H) 07/30/2018 10:50 AM  
 Triglyceride 258 (H) 07/30/2018 10:50 AM  
 CHOL/HDL Ratio 5.7 (H) 05/04/2010 10:53 AM  
 
 
 Review of Systems Constitutional: Negative for fatigue and fever. Respiratory: Negative for shortness of breath. Cardiovascular: Negative for chest pain. Gastrointestinal: Negative for abdominal pain, diarrhea and nausea. Skin:  
     Heel pain, worse when first standing. Thinking of seeing podiatry. Has tried heel cups, ice, massage Otherwise as noted in HPI Patient Active Problem List  
Diagnosis Code  Vitamin D deficiency E55.9  Heel spur M77.30  Iron deficiency anemia D50.9  Fibrocystic breast changes N60.19  
 Other and unspecified hyperlipidemia E78.5  Back pain M54.9  Leg pain, left M79.605  Hyperglycemia R73.9  Hypertension I10  Type 2 diabetes mellitus without complication, without long-term current use of insulin (HCC) E11.9  High triglycerides E78.1  Fatty liver K76.0  
 Positive FIT (fecal immunochemical test) R19.5 Allergies Allergen Reactions  Pcn [Penicillins] Hives  Lisinopril Cough Dry cough  Nsaids (Non-Steroidal Anti-Inflammatory Drug) Other (comments) Abdominal pain Prior to Admission medications Medication Sig Start Date End Date Taking? Authorizing Provider  
atorvastatin (LIPITOR) 40 mg tablet Take 1 Tab by mouth daily. 9/7/18  Yes Angela Jha MD  
omeprazole (PRILOSEC) 10 mg capsule TAKE 1 CAPSULE BY MOUTH EVERY DAY 8/24/18  Yes Otto Andujar. Luis Fernando Jha MD  
metFORMIN ER (GLUCOPHAGE XR) 500 mg tablet Take 1 Tab by mouth daily. 2/19/18  Yes Angela Jha MD  
cholecalciferol, vitamin D3, (VITAMIN D3) 2,000 unit Tab Take 5,000 Int'l Units by mouth daily. Yes Historical Provider  
losartan (COZAAR) 50 mg tablet TAKE 1 TABLET BY MOUTH EVERY DAY 9/8/18   Otto Andujar. Luis Fernando Jha MD  
 
Social History Social History  Marital status:  Spouse name: N/A  
 Number of children: 4  
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 Social History Main Topics  Smoking status: Never Smoker  Smokeless tobacco: Never Used  Alcohol use No  
 Drug use: No  
 Sexual activity: Yes  
  Partners: Male Birth control/ protection: Surgical  
   Comment: monogamous with  since 1980 Other Topics Concern   Service No  
 Blood Transfusions No  
 Caffeine Concern Yes 3 cups tea daily, 2 in AM and 1 in PM  
 Occupational Exposure No  
 Hobby Hazards No  
 Sleep Concern Yes 9-10 hours daily, wakes up at 7:30 and after son leaves for school, takes 1 hour nap  Stress Concern Yes  
  planning elaborate wedding for daughter  Weight Concern No  
 Special Diet No  
 Back Care No  
 Exercise No  
 doesn't Social History Narrative Lives with  and son. Past Medical History:  
Diagnosis Date  Advanced care planning/counseling discussion 16  Allergic rhinitis, cause unspecified 10/2003  Atypical squamous cell changes of undetermined significance (ASCUS) on vaginal cytology 2018  Breast mass, right 10/12/11 Right. Dr. Louann Mariano. benign. Deon Gagnon  Carpal tunnel syndrome 2012  
 bilaterally. Dr. Leigh Ann Shin.  Chest pain 2012 Dr. Reta Monroy  Elevated alkaline phosphatase level 04  
 125  Essential hypertension, benign 12  Fibrocystic breast changes  Heel spur  Left. Dr. Sandy Barry  Herpes zoster 2003 Right chest.  
 Hyperglycemia 2012  Iron deficiency anemia 10/2003  Menorrhagia   
 due to endometrial polyps. Dr. Subhash Amaral Other and unspecified hyperlipidemia   Ovarian follicular cyst   
 bilaterally.  Positive FIT (fecal immunochemical test) 2018  Tennis elbow 2012 Right. Dr. Severo Mew.  Trigger finger 2012 Left thumb. Right middle. Dr. Severo Mew.  Unspecified vitamin D deficiency 2009 Past Surgical History:  
Procedure Laterality Date  HX DILATION AND CURETTAGE  2006  
 due to heavy menses, endometrial polyp benign. Dr. Pamela Casron  US GUIDED BREAST BIOPSY Right 10/12/2011 Right. Dr. Louann Stein Benign. OB History  Para Term  AB Living 5 4   1 4 SAB TAB Ectopic Molar Multiple Live Births Obstetric Comments Postmenopausal. LMP around 47 yo 
H/o abnl pap: yes Paps with PCP. Family History Problem Relation Age of Onset  Elevated Lipids Mother  Hypertension Mother  High Cholesterol Mother  Asthma Mother [facility] Arthritis-osteo Mother  Hypertension Father  Heart Disease Father  Heart Attack Father 72  
  Diabetes Father  Heart Disease Brother 60  
  pacemaker  Hypertension Brother  Diabetes Brother  Asthma Sister  Migraines Sister  No Known Problems Son  No Known Problems Daughter  No Known Problems Daughter  No Known Problems Daughter ? Objective:  
 
Visit Vitals  /77 (BP 1 Location: Right arm, BP Patient Position: Sitting)  Pulse 74  Temp 97.8 °F (36.6 °C) (Temporal)  Resp 18  Ht 5' 5.25\" (1.657 m)  Wt 177 lb 9.6 oz (80.6 kg)  SpO2 98%  BMI 29.33 kg/m2 Wt Readings from Last 3 Encounters:  
09/07/18 177 lb 9.6 oz (80.6 kg) 08/01/18 177 lb 4.8 oz (80.4 kg) 02/15/18 176 lb (79.8 kg) BP Readings from Last 3 Encounters:  
09/07/18 141/77  
08/01/18 119/79  
02/15/18 144/82 PHQ over the last two weeks 8/1/2018 Little interest or pleasure in doing things Not at all Feeling down, depressed, irritable, or hopeless Not at all Total Score PHQ 2 0 Physical Exam  
Constitutional: She appears well-developed and well-nourished. No distress. HENT:  
Right Ear: Tympanic membrane, external ear and ear canal normal.  
Left Ear: Tympanic membrane, external ear and ear canal normal.  
Mouth/Throat: Oropharynx is clear and moist. No oropharyngeal exudate. Eyes: Conjunctivae are normal. Pupils are equal, round, and reactive to light. Right eye exhibits no discharge. Left eye exhibits no discharge. No scleral icterus. Neck: Neck supple. No thyromegaly present. Cardiovascular: Normal rate, regular rhythm and normal heart sounds. Exam reveals no gallop and no friction rub. No murmur heard. Pulmonary/Chest: Effort normal and breath sounds normal. No respiratory distress. She has no decreased breath sounds. She has no wheezes. She has no rhonchi. She has no rales. Abdominal: Soft. Bowel sounds are normal. She exhibits no distension and no mass. There is no hepatosplenomegaly. There is no tenderness.  There is no rigidity, no rebound and no guarding. Musculoskeletal: She exhibits no edema. Lymphadenopathy:  
  She has no cervical adenopathy. Right: No supraclavicular adenopathy present. Left: No supraclavicular adenopathy present. Neurological: She is alert. She has normal strength and normal reflexes. No sensory deficit. She exhibits normal muscle tone. Skin: She is not diaphoretic. Psychiatric: She has a normal mood and affect.  Her behavior is normal.

## 2018-09-07 NOTE — MR AVS SNAPSHOT
[facility] [address] 
[address] [place] [address] 
[phone] Patient: Jamel Macrus MRN: [id number] Visit Information Date & Time Provider Department Dept. Phone Encounter #  
 9/7/2018  1:20 PM Coleman Salazar. Devyn Steel MD [facility] 74 [phone] [id number] Upcoming Health Maintenance Date Due  
 EYE EXAM RETINAL OR DILATED Q1 5/28/1970 BREAST CANCER SCRN MAMMOGRAM 5/15/2018 Influenza Age 5 to Adult 8/1/2018 HEMOGLOBIN A1C Q6M 1/30/2019 FOOT EXAM Q1 2/13/2019 MICROALBUMIN Q1 2/13/2019 FOBT Q 1 YEAR AGE 50-75 2/14/2019 DTaP/Tdap/Td series (2 - Td) 4/15/2019 LIPID PANEL Q1 7/30/2019 PAP AKA CERVICAL CYTOLOGY 2/15/2023 Allergies as of 9/7/2018  Review Complete On: 9/7/2018 By: Coleman Salazar. Devyn Steel MD  
  
 Severity Noted Reaction Type Reactions Pcn [Penicillins] High 04/16/2010    Hives Lisinopril  01/23/2013   Side Effect Cough Dry cough Nsaids (Non-steroidal Anti-inflammatory Drug)  12/03/2012    Other (comments) Abdominal pain Current Immunizations  Reviewed on 8/1/2018 Name Date Influenza Vaccine 10/11/2017, 10/1/2016, 10/11/2013 Influenza Vaccine (Mdck Quadrivalent)  Incomplete Influenza Vaccine (Quad) PF 10/7/2014 Influenza Vaccine Split 10/15/2012 Pneumococcal Polysaccharide (PPSV-23) 8/31/2017 TDAP Vaccine 4/15/2009 Not reviewed this visit You Were Diagnosed With   
  
 Codes Comments Positive FIT (fecal immunochemical test)    -  Primary ICD-10-CM: R19.5 ICD-9-CM: 792.1 Type 2 diabetes mellitus without complication, without long-term current use of insulin (HCC)     ICD-10-CM: E11.9 ICD-9-CM: 250.00 Fatty liver     ICD-10-CM: K76.0 ICD-9-CM: 571.8 High triglycerides     ICD-10-CM: E78.1 ICD-9-CM: 272.1 Essential hypertension     ICD-10-CM: I10 
ICD-9-CM: 401.9  BMI 29.0-29.9,adult     ICD-10-CM: W69.01 
 ICD-9-CM: V85.25 Vitals BP Pulse Temp Resp Height(growth percentile) Weight(growth percentile) 141/77 (BP 1 Location: Right arm, BP Patient Position: Sitting) 74 97.8 °F (36.6 °C) (Temporal) 18 5' 5.25\" (1.657 m) 177 lb 9.6 oz (80.6 kg) LMP SpO2 BMI OB Status Smoking Status 2012 98% 29.33 kg/m2 Postmenopausal Never Smoker BMI and BSA Data Body Mass Index Body Surface Area  
 29.33 kg/m 2 1.93 m 2 Preferred Pharmacy Pharmacy Name Phone [facility], [address] [phone] Your Updated Medication List  
  
   
This list is accurate as of 18  2:28 PM.  Always use your most recent med list.  
  
  
  
  
 atorvastatin 40 mg tablet Commonly known as:  LIPITOR Take 1 Tab by mouth daily. losartan 50 mg tablet Commonly known as:  COZAAR  
TAKE 1 TABLET BY MOUTH EVERY DAY  
  
 metFORMIN  mg tablet Commonly known as:  GLUCOPHAGE XR Take 1 Tab by mouth daily. omeprazole 10 mg capsule Commonly known as:  PRILOSEC  
TAKE 1 CAPSULE BY MOUTH EVERY DAY  
  
 varicella-zoster recombinant (PF) 50 mcg/0.5 mL Susr injection Commonly known as:  SHINGRIX (PF)  
0.5 mL by IntraMUSCular route once for 1 dose. Administer second dose 2-6 months after first dose. VITAMIN D3 2,000 unit Tab Generic drug:  cholecalciferol (vitamin D3) Take 5,000 Int'l Units by mouth daily. Prescriptions Printed Refills  
 varicella-zoster recombinant, PF, (SHINGRIX, PF,) 50 mcg/0.5 mL susr injection 1 Si.5 mL by IntraMUSCular route once for 1 dose. Administer second dose 2-6 months after first dose. Class: Print Route: IntraMUSCular Prescriptions Sent to Pharmacy Refills  
 atorvastatin (LIPITOR) 40 mg tablet 3 Sig: Take 1 Tab by mouth daily. Class: Normal  
 Pharmacy: [address], [address] [place] [address] #: [phone]  Route: Oral  
  
 We Performed the Following INFLUENZA VACCINE (CCIIV4 VACCINE ANTIBIO FREE 0.5 ML) [34598 CPT(R)] REFERRAL TO GASTROENTEROLOGY [YOH04 Custom] Comments:  
 Positive FIT test. Needs diagnostic colonoscopy REFERRAL TO LIVER HEPATOLOGY [MNZ735 Custom] Comments:  
 Elevated lfts. Hepatic steatosis. REFERRAL TO NUTRITION [REF50 Custom] Referral Information Referral ID Referred By Referred To  
  
 [id number] [facility] [place] [address] [place], [address] [facility] Visits Status Start Date End Date 1 New Request 9/7/18 9/7/19 If your referral has a status of pending review or denied, additional information will be sent to support the outcome of this decision. Referral ID Referred By Referred To  
 [id number] Beth Langford MD  
   [address] [facility] [address] Phone: [phone] Fax: [phone] Visits Status Start Date End Date 1 New Request 9/7/18 9/7/19 If your referral has a status of pending review or denied, additional information will be sent to support the outcome of this decision. Referral ID Referred By Referred To  
 [id number] [address] [place] [address] [address] Phone: [phone] Visits Status Start Date End Date 1 New Request 9/7/18 9/7/19 If your referral has a status of pending review or denied, additional information will be sent to support the outcome of this decision. Patient Instructions TODAY, please go to: 
 Flu vaccine CHECK OUT - If you received a referral, Show the  Please schedule the following appointments at [facility] OUT: 
· Follow up with melany Cavanaugh for Diabetes and blood pressure in March 2019 Today's Plan: 
Call to schedule colonoscopy with GI Schedule to see the Liver doctor, Dr. Magui Christiansen Start new dose of lipitor 40mg. Continue metformin at same dose Schedule to see nutrition 
_____________________ Consider signing up for Keepstream to receive your results electronically. Introducing [facility]! Dear Stacy Roman: Thank you for requesting a [email or web address] account. Our records indicate that you already have an active [email or web address] account. You can access your account anytime at [email or web address] Did you know that you can access your hospital and ER discharge instructions at any time in [email or web address]? You can also review all of your test results from your hospital stay or ER visit. Additional Information If you have questions, please visit the Frequently Asked Questions section of the [email or web address] website at [email or web address] Remember, [email or web address] is NOT to be used for urgent needs. For medical emergencies, dial 911. Now available from your iPhone and Android! Please provide this summary of care documentation to your next provider. Your primary care clinician is listed as Myles Sebastian. If you have any questions after today's visit, please call [phone].

## 2018-09-08 RX ORDER — LOSARTAN POTASSIUM 50 MG/1
TABLET ORAL
Qty: 90 TAB | Refills: 3 | Status: SHIPPED | OUTPATIENT
Start: 2018-09-08 | End: 2019-02-27 | Stop reason: SDUPTHER

## 2019-02-13 DIAGNOSIS — E11.9 CONTROLLED TYPE 2 DIABETES MELLITUS WITHOUT COMPLICATION, WITHOUT LONG-TERM CURRENT USE OF INSULIN (HCC): ICD-10-CM

## 2019-02-13 RX ORDER — METFORMIN HYDROCHLORIDE 500 MG/1
500 TABLET, EXTENDED RELEASE ORAL DAILY
Qty: 30 TAB | Refills: 0 | Status: SHIPPED | OUTPATIENT
Start: 2019-02-13 | End: 2019-02-27 | Stop reason: SDUPTHER

## 2019-02-13 NOTE — TELEPHONE ENCOUNTER
PCP: Kelby Moore MD    Last appt: 9/7/2018  Future Appointments   Date Time Provider Remi Moody   2/27/2019  9:00 AM Ana Cristina Delarosa PA-C [facility]       Requested Prescriptions     Pending Prescriptions Disp Refills    metFORMIN ER (GLUCOPHAGE XR) 500 mg tablet 90 Tab 3     Sig: Take 1 Tab by mouth daily.        Prior labs and Blood pressures:  BP Readings from Last 3 Encounters:   09/07/18 141/77   08/01/18 119/79   02/15/18 144/82     Lab Results   Component Value Date/Time    Sodium 140 07/30/2018 10:50 AM    Potassium 4.4 07/30/2018 10:50 AM    Chloride 100 07/30/2018 10:50 AM    CO2 26 07/30/2018 10:50 AM    Anion gap 13 05/04/2010 10:53 AM    Glucose 139 (H) 07/30/2018 10:50 AM    BUN 16 07/30/2018 10:50 AM    Creatinine 0.65 07/30/2018 10:50 AM    BUN/Creatinine ratio 25 (H) 07/30/2018 10:50 AM    GFR est  07/30/2018 10:50 AM    GFR est non-AA 98 07/30/2018 10:50 AM    Calcium 9.5 07/30/2018 10:50 AM     Lab Results   Component Value Date/Time    Hemoglobin A1c 6.4 (H) 07/30/2018 10:50 AM     Lab Results   Component Value Date/Time    Cholesterol, total 187 07/30/2018 10:50 AM    HDL Cholesterol 41 07/30/2018 10:50 AM    LDL, calculated 94 07/30/2018 10:50 AM    VLDL, calculated 52 (H) 07/30/2018 10:50 AM    Triglyceride 258 (H) 07/30/2018 10:50 AM    CHOL/HDL Ratio 5.7 (H) 05/04/2010 10:53 AM     Lab Results   Component Value Date/Time    Vitamin D 25-Hydroxy 18.7 (L) 09/14/2011 09:30 AM    VITAMIN D, 25-HYDROXY 40.2 07/30/2018 10:50 AM       Lab Results   Component Value Date/Time    TSH 4.260 08/14/2013 09:28 AM

## 2019-02-13 NOTE — TELEPHONE ENCOUNTER
Requested Prescriptions     Pending Prescriptions Disp Refills    metFORMIN ER (GLUCOPHAGE XR) 500 mg tablet 90 Tab 3     Sig: Take 1 Tab by mouth daily. Patient is scheduled for 2/27/2019 to establish a new pcp.

## 2019-02-27 ENCOUNTER — OFFICE VISIT (OUTPATIENT)
Dept: FAMILY MEDICINE CLINIC | Age: 59
End: 2019-02-27

## 2019-02-27 VITALS
RESPIRATION RATE: 18 BRPM | OXYGEN SATURATION: 98 % | WEIGHT: 176.3 LBS | TEMPERATURE: 98 F | DIASTOLIC BLOOD PRESSURE: 86 MMHG | BODY MASS INDEX: 29.37 KG/M2 | HEIGHT: 65 IN | HEART RATE: 87 BPM | SYSTOLIC BLOOD PRESSURE: 136 MMHG

## 2019-02-27 DIAGNOSIS — Z87.09 HISTORY OF REACTIVE AIRWAY DISEASE: ICD-10-CM

## 2019-02-27 DIAGNOSIS — M76.60 ACHILLES TENDON PAIN: ICD-10-CM

## 2019-02-27 DIAGNOSIS — K21.9 GASTROESOPHAGEAL REFLUX DISEASE, ESOPHAGITIS PRESENCE NOT SPECIFIED: ICD-10-CM

## 2019-02-27 DIAGNOSIS — K76.0 FATTY LIVER: ICD-10-CM

## 2019-02-27 DIAGNOSIS — I10 ESSENTIAL HYPERTENSION: ICD-10-CM

## 2019-02-27 DIAGNOSIS — E78.5 DYSLIPIDEMIA: ICD-10-CM

## 2019-02-27 DIAGNOSIS — J06.9 VIRAL UPPER RESPIRATORY TRACT INFECTION: Primary | ICD-10-CM

## 2019-02-27 DIAGNOSIS — R79.89 ELEVATED LFTS: ICD-10-CM

## 2019-02-27 DIAGNOSIS — E11.9 CONTROLLED TYPE 2 DIABETES MELLITUS WITHOUT COMPLICATION, WITHOUT LONG-TERM CURRENT USE OF INSULIN (HCC): ICD-10-CM

## 2019-02-27 RX ORDER — ALBUTEROL SULFATE 90 UG/1
2 AEROSOL, METERED RESPIRATORY (INHALATION)
Qty: 1 INHALER | Refills: 0 | Status: SHIPPED | OUTPATIENT
Start: 2019-02-27 | End: 2020-05-06

## 2019-02-27 RX ORDER — OMEPRAZOLE 10 MG/1
CAPSULE, DELAYED RELEASE ORAL
COMMUNITY
Start: 2017-06-28 | End: 2019-02-27 | Stop reason: SDUPTHER

## 2019-02-27 RX ORDER — CHOLECALCIFEROL (VITAMIN D3) 125 MCG
5000 CAPSULE ORAL
COMMUNITY
End: 2019-02-27 | Stop reason: SDUPTHER

## 2019-02-27 RX ORDER — MELOXICAM 15 MG/1
TABLET ORAL
Qty: 90 TAB | Refills: 1 | Status: SHIPPED | OUTPATIENT
Start: 2019-02-27 | End: 2020-01-30

## 2019-02-27 RX ORDER — LOSARTAN POTASSIUM 50 MG/1
TABLET ORAL
COMMUNITY
Start: 2017-06-28 | End: 2019-02-27 | Stop reason: SDUPTHER

## 2019-02-27 RX ORDER — MELOXICAM 15 MG/1
TABLET ORAL
Refills: 8 | COMMUNITY
Start: 2019-01-26 | End: 2019-02-27 | Stop reason: SDUPTHER

## 2019-02-27 RX ORDER — METFORMIN HYDROCHLORIDE 500 MG/1
500 TABLET, EXTENDED RELEASE ORAL DAILY
Qty: 90 TAB | Refills: 3 | Status: SHIPPED | OUTPATIENT
Start: 2019-02-27 | End: 2019-04-10 | Stop reason: SDUPTHER

## 2019-02-27 RX ORDER — ATORVASTATIN CALCIUM 20 MG/1
20 TABLET, FILM COATED ORAL
COMMUNITY
Start: 2016-05-24 | End: 2019-02-27 | Stop reason: SDUPTHER

## 2019-02-27 RX ORDER — ATORVASTATIN CALCIUM 40 MG/1
40 TABLET, FILM COATED ORAL DAILY
Qty: 90 TAB | Refills: 3 | Status: SHIPPED | OUTPATIENT
Start: 2019-02-27 | End: 2020-02-03 | Stop reason: SDUPTHER

## 2019-02-27 RX ORDER — LOSARTAN POTASSIUM 50 MG/1
TABLET ORAL
Qty: 90 TAB | Refills: 3 | Status: SHIPPED | OUTPATIENT
Start: 2019-02-27 | End: 2019-09-04 | Stop reason: DRUGHIGH

## 2019-02-27 RX ORDER — MELOXICAM 15 MG/1
15 TABLET ORAL
COMMUNITY
End: 2019-02-27 | Stop reason: SDUPTHER

## 2019-02-27 RX ORDER — OMEPRAZOLE 10 MG/1
CAPSULE, DELAYED RELEASE ORAL
Qty: 90 CAP | Refills: 1 | Status: SHIPPED | OUTPATIENT
Start: 2019-02-27 | End: 2019-11-13 | Stop reason: SDUPTHER

## 2019-02-27 NOTE — PROGRESS NOTES
Conrado Velazquez  Identified pt with two pt identifiers(name and ). No chief complaint on file. 1. Have you been to the ER, urgent care clinic since your last visit? No Hospitalized since your last visit? no    2. Have you seen or consulted any other health care providers outside of the [address] [place] [address] since your last visit? Include any pap smears or colon screening. no      Advance Care Planning    In the event something were to happen to you and you were unable to speak on your behalf, do you have an Advance Directive/ Living Will in place stating your wishes? NO    If yes, do we have a copy on file NO    If no, would you like information NO    Medication reconciliation up to date and corrected with patient at this time. Today's provider has been notified of reason for visit, vitals and flowsheets obtained on patients. Reviewed record in preparation for visit, huddled with provider and have obtained necessary documentation. Health Maintenance Due   Topic    EYE EXAM RETINAL OR DILATED     Shingrix Vaccine Age 50> (1 of 2)    HEMOGLOBIN A1C Q6M     FOOT EXAM Q1     MICROALBUMIN Q1     FOBT Q 1 YEAR AGE 50-75        Wt Readings from Last 3 Encounters:   18 177 lb 9.6 oz (80.6 kg)   18 177 lb 4.8 oz (80.4 kg)   02/15/18 176 lb (79.8 kg)     Temp Readings from Last 3 Encounters:   18 97.8 °F (36.6 °C) (Temporal)   18 99.1 °F (37.3 °C) (Oral)   02/15/18 98.2 °F (36.8 °C) (Oral)     BP Readings from Last 3 Encounters:   18 141/77   18 119/79   02/15/18 144/82     Pulse Readings from Last 3 Encounters:   18 74   18 93   02/15/18 84     There were no vitals filed for this visit.       Learning Assessment:  :     Learning Assessment 2018 7/10/2015   PRIMARY LEARNER Patient Patient   HIGHEST LEVEL OF EDUCATION - PRIMARY LEARNER  - 2 YEARS OF COLLEGE   BARRIERS PRIMARY LEARNER - NONE   CO-LEARNER CAREGIVER - No   PRIMARY LANGUAGE ENGLISH OTHER (COMMENT)   LEARNER PREFERENCE PRIMARY VIDEOS DEMONSTRATION     - LISTENING   ANSWERED BY patient Patient   RELATIONSHIP SELF SELF       Depression Screening:  :     3 most recent PHQ Screens 8/1/2018   Little interest or pleasure in doing things Not at all   Feeling down, depressed, irritable, or hopeless Not at all   Total Score PHQ 2 0       No flowsheet data found. Fall Risk Assessment:  :     Fall Risk Assessment, last 12 mths 9/7/2018   Able to walk? Yes   Fall in past 12 months? No       Abuse Screening:  :     Abuse Screening Questionnaire 8/1/2018   Do you ever feel afraid of your partner? N   Are you in a relationship with someone who physically or mentally threatens you? N   Is it safe for you to go home? Y       ADL Screening:  :     ADL Assessment 9/7/2018   Feeding yourself No Help Needed   Getting from bed to chair No Help Needed   Getting dressed No Help Needed   Bathing or showering No Help Needed   Walk across the room (includes cane/walker) No Help Needed   Using the telphone No Help Needed   Taking your medications No Help Needed   Preparing meals No Help Needed   Managing money (expenses/bills) No Help Needed   Moderately strenuous housework (laundry) No Help Needed   Shopping for personal items (toiletries/medicines) No Help Needed   Shopping for groceries No Help Needed   Driving No Help Needed   Climbing a flight of stairs No Help Needed   Getting to places beyond walking distances No Help Needed           BMI:  Weight Metrics 9/7/2018 8/1/2018 2/15/2018 2/13/2018 8/31/2017 5/30/2017 1/30/2017   Weight 177 lb 9.6 oz 177 lb 4.8 oz 176 lb 178 lb 181 lb 184 lb 4.8 oz 185 lb 3.2 oz   BMI 29.33 kg/m2 31.41 kg/m2 31.18 kg/m2 31.53 kg/m2 32.06 kg/m2 32.65 kg/m2 32.81 kg/m2           Medication reconciliation up to date and corrected with patient at this time.

## 2019-02-27 NOTE — PROGRESS NOTES
HPI:  62 y.o.  presents to [facility] care. She was former patient of Dr. Gurmeet Burns who left the practice, and new to me. Last visit 2018. She is . She has 4 children, 3 girls and 1 boy. 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 She c/o sore throat, nasal congestion, post nasal drip, cough, and myalgias. She has no fevers. She is taking tylenol prn. She uses Afrin at bedtime x 3 nights. No shortness of breath or wheezing. She has h/o of reactive airways as an adult, has had asthmatic bronchitis before treated albuterol inhaler. Her most recent inhaler has  it has been so long since needing it. Hypertension - compliant with medication, losartan 50 mg daily, no home monitoring. No history of heart disease or stroke. No chest pain, no shortness of breath, no headaches, no lower extremity swelling. DM: No home blood glucose monitoring. Most recent A1C 6.4% 2018 , was 7.4 on 2017. Takes medications as directed, metformin  mg once daily. No polyuria/polydipsia. Opthalmology appointment overdue this year with Dr. Kala Howell at  [facility],. Checks feet, and no sores noted. No tingling or numbness in feet.      Lab Results   Component Value Date/Time    Hemoglobin A1c 6.4 (H) 2018 10:50 AM    Hemoglobin A1c 6.3 (H) 2018 11:57 AM    Hemoglobin A1c 7.4 (H) 2017 10:05 AM         CHL: on lipitor 40 mg, generally follows a low fat, low sugar diet    Lab Results   Component Value Date/Time    Cholesterol, total 187 2018 10:50 AM    HDL Cholesterol 41 2018 10:50 AM    LDL, calculated 94 2018 10:50 AM    VLDL, calculated 52 (H) 2018 10:50 AM    Triglyceride 258 (H) 2018 10:50 AM    CHOL/HDL Ratio 5.7 (H) 2010 10:53 AM         GERD: takes prilosec 10 mg daily for acid reflux, she was initially started on by cardiologist due to chest pain, had a normal stress test by her report in , if she stops the prilosec she notices some symptoms of burning in her chest, last tried about 2-3 months ago, no issues with eating. Fatty liver: elevated LFTs, fatty liver on US 9/07/2017, she was not able to schedule with Dr. Nayeli Fontana due to being out of town for 3 months    Lab Results   Component Value Date/Time    Sodium 140 07/30/2018 10:50 AM    Potassium 4.4 07/30/2018 10:50 AM    Chloride 100 07/30/2018 10:50 AM    CO2 26 07/30/2018 10:50 AM    Anion gap 13 05/04/2010 10:53 AM    Glucose 139 (H) 07/30/2018 10:50 AM    BUN 16 07/30/2018 10:50 AM    Creatinine 0.65 07/30/2018 10:50 AM    BUN/Creatinine ratio 25 (H) 07/30/2018 10:50 AM    GFR est  07/30/2018 10:50 AM    GFR est non-AA 98 07/30/2018 10:50 AM    Calcium 9.5 07/30/2018 10:50 AM    Bilirubin, total 0.4 07/30/2018 10:50 AM    AST (SGOT) 48 (H) 07/30/2018 10:50 AM    Alk. phosphatase 119 (H) 07/30/2018 10:50 AM    Protein, total 6.8 07/30/2018 10:50 AM    Albumin 4.4 07/30/2018 10:50 AM    Globulin 3.5 05/04/2010 10:53 AM    A-G Ratio 1.8 07/30/2018 10:50 AM    ALT (SGPT) 79 (H) 07/30/2018 10:50 AM         Seasonal allergies: uses flonase    Bilateral heel pain: saw a podiatrist, was put on meloxicam      Patient Active Problem List    Diagnosis    Positive FIT (fecal immunochemical test)    Fatty liver     With elevated LFTs      Type 2 diabetes mellitus without complication, without long-term current use of insulin (HCC)    High triglycerides    Hypertension    Hyperglycemia    Back pain     Left possibly due to musculoskeletal      Leg pain, left    Heel spur     Left.   Dr. Alisha Laws Fibrocystic breast changes    Other and unspecified hyperlipidemia    Vitamin D deficiency    Iron deficiency anemia         Past Medical History:   Diagnosis Date    Advanced care planning/counseling discussion 5/24/16    Allergic rhinitis, cause unspecified 10/2003    Atypical squamous cell changes of undetermined significance (ASCUS) on vaginal cytology 2/13/2018  Breast mass, right 10/12/11    Right. Dr. Igor Morton. benign. .    Carpal tunnel syndrome 09/2012    bilaterally. Dr. Osiris Trujillo.  Chest pain 03/2012    Dr. Virginia Erazo    Elevated alkaline phosphatase level 05/16/04    125    Essential hypertension, benign 09/18/12    Fibrocystic breast changes     Heel spur 2010    Left. Dr. Eleni Chaves    Herpes zoster 07/2003    Right chest.    Hyperglycemia 8/23/2012    Iron deficiency anemia 10/2003    Menorrhagia 2006    due to endometrial polyps. Dr. General Miller Other and unspecified hyperlipidemia 8251    Ovarian follicular cyst 32/88/09    bilaterally.  Positive FIT (fecal immunochemical test) 2/22/2018    Tennis elbow 09/2012    Right. Dr. Teresa Farah.  Trigger finger 09/2012    Left thumb. Right middle. Dr. Teresa Farah.  Unspecified vitamin D deficiency 04/2009       Social History     Tobacco Use    Smoking status: Never Smoker    Smokeless tobacco: Never Used   Substance Use Topics    Alcohol use: No    Drug use: No       Outpatient Medications Marked as Taking for the 2/27/19 encounter (Office Visit) with Cris Delarosa PA-C   Medication Sig Dispense Refill    meloxicam (MOBIC) 15 mg tablet TAKE 1 TABLET BY MOUTH EVERY DAY AS NEEDED  8    metFORMIN ER (GLUCOPHAGE XR) 500 mg tablet Take 1 Tab by mouth daily. 30 Tab 0    losartan (COZAAR) 50 mg tablet TAKE 1 TABLET BY MOUTH EVERY DAY 90 Tab 3    atorvastatin (LIPITOR) 40 mg tablet Take 1 Tab by mouth daily. 90 Tab 3    omeprazole (PRILOSEC) 10 mg capsule TAKE 1 CAPSULE BY MOUTH EVERY DAY 90 Cap 3    cholecalciferol, vitamin D3, (VITAMIN D3) 2,000 unit Tab Take 5,000 Int'l Units by mouth daily. Allergies   Allergen Reactions    Pcn [Penicillins] Hives    Lisinopril Cough     Dry cough    Nsaids (Non-Steroidal Anti-Inflammatory Drug) Other (comments)     Abdominal pain       ROS:  ROS negative except as per HPI.       PE:  Visit Vitals  /77 (BP 1 Location: Right arm, BP Patient Position: Sitting)   Pulse 87   Temp 98 °F (36.7 °C) (Oral)   Resp 18   Ht 5' 5.25\" (1.657 m)   Wt 176 lb 4.8 oz (80 kg)   LMP 03/04/2012   SpO2 98%   BMI 29.11 kg/m²     Gen: alert, oriented, no acute distress  Head: normocephalic, atraumatic  Ears: external auditory canals clear, TMs without erythema or effusion  Eyes: pupils equal round reactive to light, sclera clear, conjunctiva clear  Oral: moist mucus membranes, no oral lesions, no pharyngeal inflammation or exudate  Neck: symmetric normal sized thyroid, no carotid bruits, no jugular vein distention  Resp: no increase work of breathing, lungs clear to ausculation bilaterally, no wheezing, rales or rhonchi  CV: S1, S2 normal.  No murmurs, rubs, or gallops. Abd: soft, not tender, not distended. No hepatosplenomegaly. Normal bowel sounds. Neuro: cranial nerves intact, normal strength and movement in all extremities, reflexes and sensation intact and symmetric. Skin: no lesion or rash  Extremities: no cyanosis or edema  -tenderness bilateral Achilles insertion, no nodule    Sensory exam of the foot is normal tested with the monofilament. Good pulses, no lesions or ulcers, good peripheral pulses. Diabetic foot exam:     Left: Reflexes 2+     Vibratory sensation normal    Proprioception normal   Sharp/dull discrimination normal    Filament test normal sensation with micro filament   Pulse DP: 2+ (normal)   Pulse PT: 2+ (normal)   Deformities: None  Right: Reflexes 2+   Vibratory sensation normal   Proprioception normal   Sharp/dull discrimination normal   Filament test normal sensation with micro filament   Pulse DP: 2+ (normal)   Pulse PT: 2+ (normal)   Deformities: None        No results found for this visit on 02/27/19. Assessment/Plan:      ICD-10-CM ICD-9-CM    1. Viral upper respiratory tract infection - symptomatic treatment reviewed, stop Afrin and start Flonase, mucinex DM prn   J06.9 465.9    2.  Essential hypertension - well controlled. Continue current medication (losartan 50 mg daily). Exercise, and low salt/ low caffeine diet were discussed. I10 401.9 losartan (COZAAR) 50 mg tablet      CBC WITH AUTOMATED DIFF      METABOLIC PANEL, COMPREHENSIVE   3. Controlled type 2 diabetes mellitus without complication, without long-term current use of insulin (HCC) - on metformin xr 500 mg once daily, Most recent A1C 6.4% 7/30/2018, has not had diabetic teaching so I referred, overdue for eye exam so reminded to schedule E11.9 250.00 metFORMIN ER (GLUCOPHAGE XR) 500 mg tablet      REFERRAL TO DIABETIC EDUCATION       DIABETES FOOT EXAM      LIPID PANEL      HEMOGLOBIN A1C WITH EAG      MICROALBUMIN, UR, RAND W/ MICROALB/CREAT RATIO   4. Dyslipidemia - lipitor 40 mg, generally follows a low fat, low sugar diet; LDL 94,  E78.5 272.4 atorvastatin (LIPITOR) 40 mg tablet      METABOLIC PANEL, COMPREHENSIVE      LIPID PANEL   5. Fatty liver - noted on US in 2017, she did not schedule with Dr. Domenic Timmons as referred last year by Dr. Kamila Segura due to being out of town for 3 months, she would like to see what current labs show before deciding to schedule with specialist, of note her diabetes is under better control since 2017 and her weight is down 8 lbs since 5/2017   M37.0 617.2 METABOLIC PANEL, COMPREHENSIVE   6. BMI 29.0-29.9,adult - diet and exercise discussed   Z68.29 V85.25    7. Elevated LFTs - see #5, labs 7/30/2018 show AST 48 (previously 32) , ALT 79 (previously 57)   O13.3 282.7 METABOLIC PANEL, COMPREHENSIVE   8.  Gastroesophageal reflux disease, esophagitis presence not specified - on chronic daily prilosec 10 mg for many years, she has notices symptoms if she stops her dose, rebound symptoms may occur fi stop abruptly, we discussed a taper plan of taking it every other day, if that causes symptoms then she will decrease by one dose weekly q4-8 wks; however, she is on chronic meloxicam at the moment and I have asked her to take the prilosec every day that she has a dose of meloxicam. If she can not wean the meloxicam, then she must continue the prilosec until we have her foot pain further managed. See #9   K21.9 530.81 omeprazole (PRILOSEC) 10 mg capsule   9. Achilles tendon pain - saw podiatry last autumn 2018 and was put on meloxicam 15 mg daily. I have asked her to see if she can reduce dose to no more than twice weekly prn, if unable to do so she should follow up with podiatry again, of note she thinks perhaps Aleve caused stomach pain, but she is not having stomach pain on meloxicam and prilosec. M76.60 727.89 meloxicam (MOBIC) 15 mg tablet   10. History of reactive airway disease - has had asthmatic bronchitis in the past, currently no wheezing, refilled albuterol for prn Z87.09 V12.69 albuterol (PROVENTIL HFA, VENTOLIN HFA, PROAIR HFA) 90 mcg/actuation inhaler     Health Maintenance reviewed - updated. Medications Discontinued During This Encounter   Medication Reason    meloxicam (MOBIC) 15 mg tablet Duplicate Order    losartan (COZAAR) 50 mg tablet Duplicate Order    omeprazole (PRILOSEC) 10 mg capsule Duplicate Order    cholecalciferol, vitamin D3, 9,861 unit tab Duplicate Order    atorvastatin (LIPITOR) 20 mg tablet Duplicate Order    atorvastatin (LIPITOR) 40 mg tablet Reorder    losartan (COZAAR) 50 mg tablet Reorder    metFORMIN ER (GLUCOPHAGE XR) 500 mg tablet Reorder    omeprazole (PRILOSEC) 10 mg capsule Reorder    meloxicam (MOBIC) 15 mg tablet Reorder       Current Outpatient Medications   Medication Sig Dispense Refill    atorvastatin (LIPITOR) 40 mg tablet Take 1 Tab by mouth daily. 90 Tab 3    losartan (COZAAR) 50 mg tablet TAKE 1 TABLET BY MOUTH EVERY DAY 90 Tab 3    metFORMIN ER (GLUCOPHAGE XR) 500 mg tablet Take 1 Tab by mouth daily.  90 Tab 3    omeprazole (PRILOSEC) 10 mg capsule TAKE 1 CAPSULE BY MOUTH EVERY DAY 90 Cap 1    meloxicam (MOBIC) 15 mg tablet TAKE 1 TABLET BY MOUTH EVERY DAY AS NEEDED 90 Tab 1    albuterol (PROVENTIL HFA, VENTOLIN HFA, PROAIR HFA) 90 mcg/actuation inhaler Take 2 Puffs by inhalation every six (6) hours as needed for Wheezing. 1 Inhaler 0    cholecalciferol, vitamin D3, (VITAMIN D3) 2,000 unit Tab Take 5,000 Int'l Units by mouth daily. Recommended healthy diet low in carbohydrates, fats, sodium and cholesterol. Recommended regular cardiovascular exercise 3-6 times per week for 30-60 minutes daily. Verbal and written instructions (see AVS) provided. Patient expresses understanding of diagnosis and treatment plan. Follow-up Disposition:  Return in about 6 months (around 8/27/2019).

## 2019-02-27 NOTE — PATIENT INSTRUCTIONS
Start Flonase 2 sprays each nostril once daily for 2-4 weeks    Mucinex DM for cough    Try to wean the Prilosec like we discussed    If taking meloxicam, you need the Prilosec that day to protect the stomach. If not able to limit use of meloxicam, then we need to address the foot pain again. Counting Carbohydrates: Care Instructions  Your Care Instructions    You don't have to eat special foods when you have diabetes. You just have to be careful to eat healthy foods. Carbohydrates (carbs) raise blood sugar higher and quicker than any other nutrient. Carbs are found in desserts, breads and cereals, and fruit. They're also in starchy vegetables. These include potatoes, corn, and grains such as rice and pasta. Carbs are also in milk and yogurt. The more carbs you eat at one time, the higher your blood sugar will rise. Spreading carbs all through the day helps keep your blood sugar levels within your target range. Counting carbs is one of the best ways to keep your blood sugar under control. If you use insulin, counting carbs helps you match the right amount of insulin to the number of grams of carbs in a meal. Then you can change your diet and insulin dose as needed. Testing your blood sugar several times a day can help you learn how carbs affect your blood sugar. A registered dietitian or certified diabetes educator can help you plan meals and snacks. Follow-up care is a key part of your treatment and safety. Be sure to make and go to all appointments, and call your doctor if you are having problems. It's also a good idea to know your test results and keep a list of the medicines you take. How can you care for yourself at home? Know your daily amount of carbohydrates  Your daily amount depends on several things, such as your weight, how active you are, which diabetes medicines you take, and what your goals are for your blood sugar levels.  A registered dietitian or certified diabetes educator can help you plan how many carbs to include in each meal and snack. For most adults, a guideline for the daily amount of carbs is:  · 45 to 60 grams at each meal. That's about the same as 3 to 4 carbohydrate servings. · 15 to 20 grams at each snack. That's about the same as 1 carbohydrate serving. Count carbs  Counting carbs lets you know how much rapid-acting insulin to take before you eat. If you use an insulin pump, you get a constant rate of insulin during the day. So the pump must be programmed at meals. This gives you extra insulin to cover the rise in blood sugar after meals. If you take insulin:  · Learn your own insulin-to-carb ratio. You and your diabetes health professional will figure out the ratio. You can do this by testing your blood sugar after meals. For example, you may need a certain amount of insulin for every 15 grams of carbs. · Add up the carb grams in a meal. Then you can figure out how many units of insulin to take based on your insulin-to-carb ratio. · Exercise lowers blood sugar. You can use less insulin than you would if you were not doing exercise. Keep in mind that timing matters. If you exercise within 1 hour after a meal, your body may need less insulin for that meal than it would if you exercised 3 hours after the meal. Test your blood sugar to find out how exercise affects your need for insulin. If you do or don't take insulin:  · Look at labels on packaged foods. This can tell you how many carbs are in a serving. You can also use guides from the American Diabetes Association. · Be aware of portions, or serving sizes. If a package has two servings and you eat the whole package, you need to double the number of grams of carbohydrate listed for one serving. · Protein, fat, and fiber do not raise blood sugar as much as carbs do. If you eat a lot of these nutrients in a meal, your blood sugar will rise more slowly than it would otherwise.   Eat from all food groups  · Eat at least three meals a day. · Plan meals to include food from all the food groups. The food groups include grains, fruits, dairy, proteins, and vegetables. · Talk to your dietitian or diabetes educator about ways to add limited amounts of sweets into your meal plan. · If you drink alcohol, talk to your doctor. It may not be recommended when you are taking certain diabetes medicines. Where can you learn more? Go to http://candice-carina.info/. Enter D204 in the search box to learn more about \"Counting Carbohydrates: Care Instructions. \"  Current as of: July 25, 2018  Content Version: 11.9  © 0249-3177 [employer]. Care instructions adapted under license by [employer] (which disclaims liability or warranty for this information). If you have questions about a medical condition or this instruction, always ask your healthcare professional. [address] any warranty or liability for your use of this information. Learning About Meal Planning for Diabetes  Why plan your meals? Meal planning can be a key part of managing diabetes. Planning meals and snacks with the right balance of carbohydrate, protein, and fat can help you keep your blood sugar at the target level you set with your doctor. You don't have to eat special foods. You can eat what your family eats, including sweets once in a while. But you do have to pay attention to how often you eat and how much you eat of certain foods. You may want to work with a dietitian or a certified diabetes educator. He or she can give you tips and meal ideas and can answer your questions about meal planning. This health professional can also help you reach a healthy weight if that is one of your goals. What plan is right for you? Your dietitian or diabetes educator may suggest that you start with the plate format or carbohydrate counting.   The plate format  The plate format is a simple way to help you manage how you eat. You plan meals by learning how much space each food should take on a plate. Using the plate format helps you spread carbohydrate throughout the day. It can make it easier to keep your blood sugar level within your target range. It also helps you see if you're eating healthy portion sizes. To use the plate format, you put non-starchy vegetables on half your plate. Add meat or meat substitutes on one-quarter of the plate. Put a grain or starchy vegetable (such as brown rice or a potato) on the final quarter of the plate. You can add a small piece of fruit and some low-fat or fat-free milk or yogurt, depending on your carbohydrate goal for each meal.  Here are some tips for using the plate format:  · Make sure that you are not using an oversized plate. A 9-inch plate is best. Many restaurants use larger plates. · Get used to using the plate format at home. Then you can use it when you eat out. · Write down your questions about using the plate format. Talk to your doctor, a dietitian, or a diabetes educator about your concerns. Carbohydrate counting  With carbohydrate counting, you plan meals based on the amount of carbohydrate in each food. Carbohydrate raises blood sugar higher and more quickly than any other nutrient. It is found in desserts, breads and cereals, and fruit. It's also found in starchy vegetables such as potatoes and corn, grains such as rice and pasta, and milk and yogurt. Spreading carbohydrate throughout the day helps keep your blood sugar levels within your target range. Your daily amount depends on several things, including your weight, how active you are, which diabetes medicines you take, and what your goals are for your blood sugar levels. A registered dietitian or diabetes educator can help you plan how much carbohydrate to include in each meal and snack.   A guideline for your daily amount of carbohydrate is:  · 45 to 60 grams at each meal. That's about the same as 3 to 4 carbohydrate servings. · 15 to 20 grams at each snack. That's about the same as 1 carbohydrate serving. The Nutrition Facts label on packaged foods tells you how much carbohydrate is in a serving of the food. First, look at the serving size on the food label. Is that the amount you eat in a serving? All of the nutrition information on a food label is based on that serving size. So if you eat more or less than that, you'll need to adjust the other numbers. Total carbohydrate is the next thing you need to look for on the label. If you count carbohydrate servings, one serving of carbohydrate is 15 grams. For foods that don't come with labels, such as fresh fruits and vegetables, you'll need a guide that lists carbohydrate in these foods. Ask your doctor, dietitian, or diabetes educator about books or other nutrition guides you can use. If you take insulin, you need to know how many grams of carbohydrate are in a meal. This lets you know how much rapid-acting insulin to take before you eat. If you use an insulin pump, you get a constant rate of insulin during the day. So the pump must be programmed at meals to give you extra insulin to cover the rise in blood sugar after meals. When you know how much carbohydrate you will eat, you can take the right amount of insulin. Or, if you always use the same amount of insulin, you need to make sure that you eat the same amount of carbohydrate at meals. If you need more help to understand carbohydrate counting and food labels, ask your doctor, dietitian, or diabetes educator. How do you get started with meal planning? Here are some tips to get started:  · Plan your meals a week at a time. Don't forget to include snacks too. · Use cookbooks or online recipes to plan several main meals. Plan some quick meals for busy nights. You also can double some recipes that freeze well. Then you can save half for other busy nights when you don't have time to cook.   · Make sure you have the ingredients you need for your recipes. If you're running low on basic items, put these items on your shopping list too. · List foods that you use to make breakfasts, lunches, and snacks. List plenty of fruits and vegetables. · Post this list on the refrigerator. Add to it as you think of more things you need. · Take the list to the store to do your weekly shopping. Follow-up care is a key part of your treatment and safety. Be sure to make and go to all appointments, and call your doctor if you are having problems. It's also a good idea to know your test results and keep a list of the medicines you take. Where can you learn more? Go to http://candice-carina.info/. Motley Bill in the search box to learn more about \"Learning About Meal Planning for Diabetes. \"  Current as of: July 25, 2018  Content Version: 11.9  © 0393-9589 [employer]. Care instructions adapted under license by [employer] (which disclaims liability or warranty for this information). If you have questions about a medical condition or this instruction, always ask your healthcare professional. [place] [address] any warranty or liability for your use of this information. Using a Nasal Steroid Spray: Care Instructions  Your Care Instructions    Your doctor may suggest using a corticosteroid nasal spray for your allergy symptoms or sinus problems. These sprays reduce the swelling inside the nose and sinuses. Unlike decongestant nasal sprays, steroid sprays won't lead to more swelling when you stop taking them. These sprays start working in a few days, but it may take several weeks before you get the full effect. Most side effects are minor. The most common complaint is a burning feeling in the nose right after the spray is used. Some people get nosebleeds. Follow-up care is a key part of your treatment and safety.  Be sure to make and go to all appointments, and call your doctor if you are having problems. It's also a good idea to know your test results and keep a list of the medicines you take. How can you care for yourself at home? Here are some tips for using these sprays:  · You may need to prime the sprayer before you use it. This means spraying it into the air a few times to make sure you get the right amount of medicine. Follow the directions on the label. · Blow your nose before you spray. This will help clear out your nostrils. · Gently sniff the medicine into your nose as you spray. Don't snort, or the medicine will go all the way into your throat where it won't do much good. · Aim the nozzle straight toward the outer wall of your nostril. This will help keep the medicine from irritating the inner walls of your nose, especially your septum (the wall that separates your left and right nostrils). · Don't blow your nose for 10 minutes or so after you spray. And try not to sneeze. · Be safe with medicines. Use this medicine exactly as prescribed. Call your doctor if you think you are having a problem with your medicine. · Clean your sprayer once a week. Read the label to learn how. When should you call for help? Watch closely for changes in your health, and be sure to contact your doctor if you have any problems. Where can you learn more? Go to http://candice-carina.info/. Enter O882 in the search box to learn more about \"Using a Nasal Steroid Spray: Care Instructions. \"  Current as of: March 27, 2018  Content Version: 11.9  © 6873-4433 [employer]. Care instructions adapted under license by [employer] (which disclaims liability or warranty for this information). If you have questions about a medical condition or this instruction, always ask your healthcare professional. [address] any warranty or liability for your use of this information.

## 2019-02-28 DIAGNOSIS — R79.89 ELEVATED LFTS: ICD-10-CM

## 2019-02-28 DIAGNOSIS — K76.0 FATTY LIVER: ICD-10-CM

## 2019-02-28 DIAGNOSIS — I10 ESSENTIAL HYPERTENSION: ICD-10-CM

## 2019-02-28 DIAGNOSIS — E78.5 DYSLIPIDEMIA: ICD-10-CM

## 2019-02-28 DIAGNOSIS — E11.9 CONTROLLED TYPE 2 DIABETES MELLITUS WITHOUT COMPLICATION, WITHOUT LONG-TERM CURRENT USE OF INSULIN (HCC): ICD-10-CM

## 2019-03-01 LAB
ALBUMIN SERPL-MCNC: 4.6 G/DL (ref 3.5–5.5)
ALBUMIN/CREAT UR: 20.8 MG/G CREAT (ref 0–30)
ALBUMIN/GLOB SERPL: 2 {RATIO} (ref 1.2–2.2)
ALP SERPL-CCNC: 125 IU/L (ref 39–117)
ALT SERPL-CCNC: 53 IU/L (ref 0–32)
AST SERPL-CCNC: 28 IU/L (ref 0–40)
BASOPHILS # BLD AUTO: 0 X10E3/UL (ref 0–0.2)
BASOPHILS NFR BLD AUTO: 0 %
BILIRUB SERPL-MCNC: 0.4 MG/DL (ref 0–1.2)
BUN SERPL-MCNC: 17 MG/DL (ref 6–24)
BUN/CREAT SERPL: 24 (ref 9–23)
CALCIUM SERPL-MCNC: 9.5 MG/DL (ref 8.7–10.2)
CHLORIDE SERPL-SCNC: 102 MMOL/L (ref 96–106)
CHOLEST SERPL-MCNC: 170 MG/DL (ref 100–199)
CO2 SERPL-SCNC: 26 MMOL/L (ref 20–29)
CREAT SERPL-MCNC: 0.7 MG/DL (ref 0.57–1)
CREAT UR-MCNC: 91.5 MG/DL
EOSINOPHIL # BLD AUTO: 0.2 X10E3/UL (ref 0–0.4)
EOSINOPHIL NFR BLD AUTO: 2 %
ERYTHROCYTE [DISTWIDTH] IN BLOOD BY AUTOMATED COUNT: 13.3 % (ref 12.3–15.4)
EST. AVERAGE GLUCOSE BLD GHB EST-MCNC: 146 MG/DL
GLOBULIN SER CALC-MCNC: 2.3 G/DL (ref 1.5–4.5)
GLUCOSE SERPL-MCNC: 140 MG/DL (ref 65–99)
HBA1C MFR BLD: 6.7 % (ref 4.8–5.6)
HCT VFR BLD AUTO: 43.3 % (ref 34–46.6)
HDLC SERPL-MCNC: 40 MG/DL
HGB BLD-MCNC: 14.1 G/DL (ref 11.1–15.9)
IMM GRANULOCYTES # BLD AUTO: 0 X10E3/UL (ref 0–0.1)
IMM GRANULOCYTES NFR BLD AUTO: 0 %
INTERPRETATION, 910389: NORMAL
LDLC SERPL CALC-MCNC: 85 MG/DL (ref 0–99)
LYMPHOCYTES # BLD AUTO: 2.8 X10E3/UL (ref 0.7–3.1)
LYMPHOCYTES NFR BLD AUTO: 30 %
Lab: NORMAL
MCH RBC QN AUTO: 29.6 PG (ref 26.6–33)
MCHC RBC AUTO-ENTMCNC: 32.6 G/DL (ref 31.5–35.7)
MCV RBC AUTO: 91 FL (ref 79–97)
MICROALBUMIN UR-MCNC: 19 UG/ML
MONOCYTES # BLD AUTO: 0.6 X10E3/UL (ref 0.1–0.9)
MONOCYTES NFR BLD AUTO: 6 %
NEUTROPHILS # BLD AUTO: 5.7 X10E3/UL (ref 1.4–7)
NEUTROPHILS NFR BLD AUTO: 62 %
PLATELET # BLD AUTO: 278 X10E3/UL (ref 150–379)
POTASSIUM SERPL-SCNC: 5 MMOL/L (ref 3.5–5.2)
PROT SERPL-MCNC: 6.9 G/DL (ref 6–8.5)
RBC # BLD AUTO: 4.76 X10E6/UL (ref 3.77–5.28)
SODIUM SERPL-SCNC: 144 MMOL/L (ref 134–144)
TRIGL SERPL-MCNC: 223 MG/DL (ref 0–149)
VLDLC SERPL CALC-MCNC: 45 MG/DL (ref 5–40)
WBC # BLD AUTO: 9.3 X10E3/UL (ref 3.4–10.8)

## 2019-03-08 NOTE — PROGRESS NOTES
Results reviewed and letter sent. Liver enzymes improving, AST normal at 28, ALT 53. Continue weight loss and will monitor. A1C slightly up from last check, now 6.7%, continue current metformin and schedule DM educ. Total CHL at goal, but elevated , and LDL 85 not at goal of 70. Work on diet, lower saturated fats, and the DM nutrition will help with this as well.

## 2019-04-10 DIAGNOSIS — E11.9 CONTROLLED TYPE 2 DIABETES MELLITUS WITHOUT COMPLICATION, WITHOUT LONG-TERM CURRENT USE OF INSULIN (HCC): ICD-10-CM

## 2019-04-10 RX ORDER — METFORMIN HYDROCHLORIDE 500 MG/1
TABLET, EXTENDED RELEASE ORAL
Qty: 90 TAB | Refills: 1 | Status: SHIPPED | OUTPATIENT
Start: 2019-04-10 | End: 2019-09-04 | Stop reason: SDUPTHER

## 2019-04-10 NOTE — TELEPHONE ENCOUNTER
Zander Rowley   Requested Prescriptions     Pending Prescriptions Disp Refills    metFORMIN ER (GLUCOPHAGE XR) 500 mg tablet 90 Tab 1

## 2019-04-10 NOTE — TELEPHONE ENCOUNTER
PCP: Maria Victoria Pederson PA-C    Last appt: 2/28/2019  Future Appointments   Date Time Provider Remi Parrai   8/26/2019 10:30 AM Ruslan Delarosa PA-C [facility]       Requested Prescriptions     Pending Prescriptions Disp Refills    metFORMIN ER (GLUCOPHAGE XR) 500 mg tablet 90 Tab 1     Sig: TAKE 1 TABLET BY MOUTH EVERY DAY       Prior labs and Blood pressures:  BP Readings from Last 3 Encounters:   02/27/19 136/86   09/07/18 141/77   08/01/18 119/79     Lab Results   Component Value Date/Time    Sodium 144 02/28/2019 10:09 AM    Potassium 5.0 02/28/2019 10:09 AM    Chloride 102 02/28/2019 10:09 AM    CO2 26 02/28/2019 10:09 AM    Anion gap 13 05/04/2010 10:53 AM    Glucose 140 (H) 02/28/2019 10:09 AM    BUN 17 02/28/2019 10:09 AM    Creatinine 0.70 02/28/2019 10:09 AM    BUN/Creatinine ratio 24 (H) 02/28/2019 10:09 AM    GFR est  02/28/2019 10:09 AM    GFR est non-AA 96 02/28/2019 10:09 AM    Calcium 9.5 02/28/2019 10:09 AM     Lab Results   Component Value Date/Time    Hemoglobin A1c 6.7 (H) 02/28/2019 10:09 AM     Lab Results   Component Value Date/Time    Cholesterol, total 170 02/28/2019 10:09 AM    HDL Cholesterol 40 02/28/2019 10:09 AM    LDL, calculated 85 02/28/2019 10:09 AM    VLDL, calculated 45 (H) 02/28/2019 10:09 AM    Triglyceride 223 (H) 02/28/2019 10:09 AM    CHOL/HDL Ratio 5.7 (H) 05/04/2010 10:53 AM     Lab Results   Component Value Date/Time    Vitamin D 25-Hydroxy 18.7 (L) 09/14/2011 09:30 AM    VITAMIN D, 25-HYDROXY 40.2 07/30/2018 10:50 AM       Lab Results   Component Value Date/Time    TSH 4.260 08/14/2013 09:28 AM

## 2019-04-11 NOTE — TELEPHONE ENCOUNTER
I tried to refill her RX refill request for metformin, but e-scribing failed. Can you please call in the refill order for metformin? You'll see it on the med list. Thanks.

## 2019-08-26 ENCOUNTER — OFFICE VISIT (OUTPATIENT)
Dept: FAMILY MEDICINE CLINIC | Age: 59
End: 2019-08-26

## 2019-08-26 VITALS
WEIGHT: 181.5 LBS | RESPIRATION RATE: 18 BRPM | HEART RATE: 76 BPM | HEIGHT: 65 IN | OXYGEN SATURATION: 96 % | DIASTOLIC BLOOD PRESSURE: 88 MMHG | TEMPERATURE: 97.9 F | SYSTOLIC BLOOD PRESSURE: 150 MMHG | BODY MASS INDEX: 30.24 KG/M2

## 2019-08-26 DIAGNOSIS — E78.1 HIGH TRIGLYCERIDES: ICD-10-CM

## 2019-08-26 DIAGNOSIS — E11.9 TYPE 2 DIABETES MELLITUS WITHOUT COMPLICATION, WITHOUT LONG-TERM CURRENT USE OF INSULIN (HCC): Primary | ICD-10-CM

## 2019-08-26 DIAGNOSIS — K76.0 FATTY LIVER: ICD-10-CM

## 2019-08-26 DIAGNOSIS — I10 ESSENTIAL HYPERTENSION: ICD-10-CM

## 2019-08-26 NOTE — PATIENT INSTRUCTIONS
Watch the salts in the diet in processed foods. Eat fresh, and increase water intake. DASH Diet: Care Instructions  Your Care Instructions    The DASH diet is an eating plan that can help lower your blood pressure. DASH stands for Dietary Approaches to Stop Hypertension. Hypertension is high blood pressure. The DASH diet focuses on eating foods that are high in calcium, potassium, and magnesium. These nutrients can lower blood pressure. The foods that are highest in these nutrients are fruits, vegetables, low-fat dairy products, nuts, seeds, and legumes. But taking calcium, potassium, and magnesium supplements instead of eating foods that are high in those nutrients does not have the same effect. The DASH diet also includes whole grains, fish, and poultry. The DASH diet is one of several lifestyle changes your doctor may recommend to lower your high blood pressure. Your doctor may also want you to decrease the amount of sodium in your diet. Lowering sodium while following the DASH diet can lower blood pressure even further than just the DASH diet alone. Follow-up care is a key part of your treatment and safety. Be sure to make and go to all appointments, and call your doctor if you are having problems. It's also a good idea to know your test results and keep a list of the medicines you take. How can you care for yourself at home? Following the DASH diet  · Eat 4 to 5 servings of fruit each day. A serving is 1 medium-sized piece of fruit, ½ cup chopped or canned fruit, 1/4 cup dried fruit, or 4 ounces (½ cup) of fruit juice. Choose fruit more often than fruit juice. · Eat 4 to 5 servings of vegetables each day. A serving is 1 cup of lettuce or raw leafy vegetables, ½ cup of chopped or cooked vegetables, or 4 ounces (½ cup) of vegetable juice. Choose vegetables more often than vegetable juice. · Get 2 to 3 servings of low-fat and fat-free dairy each day.  A serving is 8 ounces of milk, 1 cup of yogurt, or 1 ½ ounces of cheese. · Eat 6 to 8 servings of grains each day. A serving is 1 slice of bread, 1 ounce of dry cereal, or ½ cup of cooked rice, pasta, or cooked cereal. Try to choose whole-grain products as much as possible. · Limit lean meat, poultry, and fish to 2 servings each day. A serving is 3 ounces, about the size of a deck of cards. · Eat 4 to 5 servings of nuts, seeds, and legumes (cooked dried beans, lentils, and split peas) each week. A serving is 1/3 cup of nuts, 2 tablespoons of seeds, or ½ cup of cooked beans or peas. · Limit fats and oils to 2 to 3 servings each day. A serving is 1 teaspoon of vegetable oil or 2 tablespoons of salad dressing. · Limit sweets and added sugars to 5 servings or less a week. A serving is 1 tablespoon jelly or jam, ½ cup sorbet, or 1 cup of lemonade. · Eat less than 2,300 milligrams (mg) of sodium a day. If you limit your sodium to 1,500 mg a day, you can lower your blood pressure even more. Tips for success  · Start small. Do not try to make dramatic changes to your diet all at once. You might feel that you are missing out on your favorite foods and then be more likely to not follow the plan. Make small changes, and stick with them. Once those changes become habit, add a few more changes. · Try some of the following:  ? Make it a goal to eat a fruit or vegetable at every meal and at snacks. This will make it easy to get the recommended amount of fruits and vegetables each day. ? Try yogurt topped with fruit and nuts for a snack or healthy dessert. ? Add lettuce, tomato, cucumber, and onion to sandwiches. ? Combine a ready-made pizza crust with low-fat mozzarella cheese and lots of vegetable toppings. Try using tomatoes, squash, spinach, broccoli, carrots, cauliflower, and onions. ? Have a variety of cut-up vegetables with a low-fat dip as an appetizer instead of chips and dip. ? Sprinkle sunflower seeds or chopped almonds over salads.  Or try adding chopped walnuts or almonds to cooked vegetables. ? Try some vegetarian meals using beans and peas. Add garbanzo or kidney beans to salads. Make burritos and tacos with mashed gonzalez beans or black beans. Where can you learn more? Go to http://candice-carina.info/. Enter G629 in the search box to learn more about \"DASH Diet: Care Instructions. \"  Current as of: July 22, 2018  Content Version: 12.1  © 5231-4529 [employer]. Care instructions adapted under license by [employer] (which disclaims liability or warranty for this information). If you have questions about a medical condition or this instruction, always ask your healthcare professional. [address] any warranty or liability for your use of this information.

## 2019-08-26 NOTE — PROGRESS NOTES
Henry Radha Velazquez  Identified pt with two pt identifiers(name and ). Chief Complaint   Patient presents with    Diabetes     rm 11/fasting/hypertension       1. Have you been to the ER, urgent care clinic since your last visit? no  Hospitalized since your last visit? no    2. Have you seen or consulted any other health care providers outside of the [address] [place] [address] since your last visit? Include any pap smears or colon screening. no      Advance Care Planning    In the event something were to happen to you and you were unable to speak on your behalf, do you have an Advance Directive/ Living Will in place stating your wishes? NO    If yes, do we have a copy on file NO    If no, would you like information NO    Medication reconciliation up to date and corrected with patient at this time. Today's provider has been notified of reason for visit, vitals and flowsheets obtained on patients. Reviewed record in preparation for visit, huddled with provider and have obtained necessary documentation. Health Maintenance Due   Topic    EYE EXAM RETINAL OR DILATED     Shingrix Vaccine Age 50> (1 of 2)    FOBT Q 1 YEAR AGE 54-65     DTaP/Tdap/Td series (2 - Td)    Influenza Age 5 to Adult     HEMOGLOBIN A1C Q6M     BREAST CANCER SCRN MAMMOGRAM        Wt Readings from Last 3 Encounters:   19 176 lb 4.8 oz (80 kg)   18 177 lb 9.6 oz (80.6 kg)   18 177 lb 4.8 oz (80.4 kg)     Temp Readings from Last 3 Encounters:   19 98 °F (36.7 °C) (Oral)   18 97.8 °F (36.6 °C) (Temporal)   18 99.1 °F (37.3 °C) (Oral)     BP Readings from Last 3 Encounters:   19 136/86   18 141/77   18 119/79     Pulse Readings from Last 3 Encounters:   19 87   18 74   18 93     There were no vitals filed for this visit.       Learning Assessment:  :     Learning Assessment 2018 7/10/2015   PRIMARY LEARNER Patient Patient   HIGHEST LEVEL OF EDUCATION - PRIMARY LEARNER  - 2 YEARS OF COLLEGE   BARRIERS PRIMARY LEARNER - NONE   CO-LEARNER CAREGIVER - No   PRIMARY LANGUAGE ENGLISH OTHER (COMMENT)   LEARNER PREFERENCE PRIMARY VIDEOS DEMONSTRATION     - LISTENING   ANSWERED BY patient Patient   RELATIONSHIP SELF SELF       Depression Screening:  :     3 most recent PHQ Screens 2/27/2019   Little interest or pleasure in doing things Not at all   Feeling down, depressed, irritable, or hopeless Not at all   Total Score PHQ 2 0       No flowsheet data found. Fall Risk Assessment:  :     Fall Risk Assessment, last 12 mths 9/7/2018   Able to walk? Yes   Fall in past 12 months? No       Abuse Screening:  :     Abuse Screening Questionnaire 2/27/2019 8/1/2018   Do you ever feel afraid of your partner? N N   Are you in a relationship with someone who physically or mentally threatens you? N N   Is it safe for you to go home?  Y Y       ADL Screening:  :     ADL Assessment 9/7/2018   Feeding yourself No Help Needed   Getting from bed to chair No Help Needed   Getting dressed No Help Needed   Bathing or showering No Help Needed   Walk across the room (includes cane/walker) No Help Needed   Using the telphone No Help Needed   Taking your medications No Help Needed   Preparing meals No Help Needed   Managing money (expenses/bills) No Help Needed   Moderately strenuous housework (laundry) No Help Needed   Shopping for personal items (toiletries/medicines) No Help Needed   Shopping for groceries No Help Needed   Driving No Help Needed   Climbing a flight of stairs No Help Needed   Getting to places beyond walking distances No Help Needed           BMI:  Weight Metrics 2/27/2019 9/7/2018 8/1/2018 2/15/2018 2/13/2018 8/31/2017 5/30/2017   Weight 176 lb 4.8 oz 177 lb 9.6 oz 177 lb 4.8 oz 176 lb 178 lb 181 lb 184 lb 4.8 oz   BMI 29.11 kg/m2 29.33 kg/m2 31.41 kg/m2 31.18 kg/m2 31.53 kg/m2 32.06 kg/m2 32.65 kg/m2           Medication reconciliation up to date and corrected with patient at this time.

## 2019-08-26 NOTE — PROGRESS NOTES
HPI:  61 y.o.  presents for follow up appointment. No hospital, ER or specialist visits since last primary care visit except as noted below. Hypertension and Hyperlipidemia - compliant with medication, losartan 50 mg daily and atorvastatin 40 mg, no home monitoring. No history of heart disease or stroke. No chest pain, no shortness of breath, no headaches, no lower extremity swelling.  has not run out of meds, takes meds daily, no increased salt or caffeine     DM: No home blood glucose monitoring. Most recent A1C 6.4% 7/30/2018 , was 7.4 on 5/30/2017. Takes medications as directed, metformin  mg once daily. No polyuria/polydipsia. Opthalmology appointment April this year with Dr. Lisa Phillip at  [facility],. Checks feet, and no sores noted. No tingling or numbness in feet. She is not exercising. Patient Active Problem List    Diagnosis    Positive FIT (fecal immunochemical test)    Fatty liver     With elevated LFTs      Type 2 diabetes mellitus without complication, without long-term current use of insulin (HCC)    High triglycerides    Hypertension    Hyperglycemia    Back pain     Left possibly due to musculoskeletal      Leg pain, left    Heel spur     Left. Dr. Cristiana Robles Fibrocystic breast changes    Other and unspecified hyperlipidemia    Vitamin D deficiency    Iron deficiency anemia         Past Medical History:   Diagnosis Date    Advanced care planning/counseling discussion 5/24/16    Allergic rhinitis, cause unspecified 10/2003    Atypical squamous cell changes of undetermined significance (ASCUS) on vaginal cytology 2/13/2018    Breast mass, right 10/12/11    Right. Dr. Lalo Mckee. benign. .    Carpal tunnel syndrome 09/2012    bilaterally. Dr. Audra Hernandez.     Chest pain 03/2012    Dr. Jazmine Colon    Elevated alkaline phosphatase level 05/16/04    125    Essential hypertension, benign 09/18/12    Fibrocystic breast changes     Heel spur 2010 Left.  Dr. Olga Gan    Herpes zoster 07/2003    Right chest.    Hyperglycemia 8/23/2012    Iron deficiency anemia 10/2003    Menorrhagia 2006    due to endometrial polyps. Dr. Shobha Baltazar Other and unspecified hyperlipidemia 2849    Ovarian follicular cyst 84/92/85    bilaterally.  Positive FIT (fecal immunochemical test) 2/22/2018    Tennis elbow 09/2012    Right. Dr. Hai Haddad.  Trigger finger 09/2012    Left thumb. Right middle. Dr. Hai Haddad.  Unspecified vitamin D deficiency 04/2009       Social History     Tobacco Use    Smoking status: Never Smoker    Smokeless tobacco: Never Used   Substance Use Topics    Alcohol use: No    Drug use: No       Outpatient Medications Marked as Taking for the 8/26/19 encounter (Office Visit) with Cris Delarosa PA-C   Medication Sig Dispense Refill    metFORMIN ER (GLUCOPHAGE XR) 500 mg tablet TAKE 1 TABLET BY MOUTH EVERY DAY 90 Tab 1    atorvastatin (LIPITOR) 40 mg tablet Take 1 Tab by mouth daily. 90 Tab 3    losartan (COZAAR) 50 mg tablet TAKE 1 TABLET BY MOUTH EVERY DAY 90 Tab 3    omeprazole (PRILOSEC) 10 mg capsule TAKE 1 CAPSULE BY MOUTH EVERY DAY 90 Cap 1    meloxicam (MOBIC) 15 mg tablet TAKE 1 TABLET BY MOUTH EVERY DAY AS NEEDED 90 Tab 1    albuterol (PROVENTIL HFA, VENTOLIN HFA, PROAIR HFA) 90 mcg/actuation inhaler Take 2 Puffs by inhalation every six (6) hours as needed for Wheezing. 1 Inhaler 0    cholecalciferol, vitamin D3, (VITAMIN D3) 2,000 unit Tab Take 5,000 Int'l Units by mouth daily. Allergies   Allergen Reactions    Pcn [Penicillins] Hives    Lisinopril Cough     Dry cough    Nsaids (Non-Steroidal Anti-Inflammatory Drug) Other (comments)     Abdominal pain       ROS:  ROS negative except as per HPI.       PE:  Visit Vitals  /88 (BP 1 Location: Right arm, BP Patient Position: Sitting)   Pulse 76   Temp 97.9 °F (36.6 °C) (Oral)   Resp 18   Ht 5' 5.25\" (1.657 m)   Wt 181 lb 8 oz (82.3 kg)   LMP 03/04/2012   SpO2 96% BMI 29.97 kg/m²     Gen: alert, oriented, no acute distress  Head: normocephalic, atraumatic  Eyes: pupils equal round reactive to light, sclera clear, conjunctiva clear  Oral: moist mucus membranes, no oral lesions, no pharyngeal inflammation or exudate  Neck: symmetric normal sized thyroid, no carotid bruits, no jugular vein distention  Resp: no increase work of breathing, lungs clear to ausculation bilaterally, no wheezing, rales or rhonchi  CV: S1, S2 normal.  No murmurs, rubs, or gallops. Skin: no lesion or rash  Extremities: no cyanosis or edema    No results found for this visit on 08/26/19. Assessment/Plan:    1. Type 2 diabetes mellitus without complication, without long-term current use of insulin (HCC)  on metformin xr 500 mg once daily, Most recent A1C 6.7% (previously 6.4%)   -normal  microalbumin  -eye exam UTD   -declined attending DM education  -exercise encouraged  -on ARB and statin  - HEMOGLOBIN A1C WITH EAG  - CBC WITH AUTOMATED DIFF  - METABOLIC PANEL, COMPREHENSIVE    2. Essential hypertension  Not controlled today, 150/88  Recent stray from diet eating out a lot during travel and increased salt  Watch the salts in the diet in processed foods. Eat fresh, and increase water intake. Suggest investing in home monitor  Continue current medication (losartan 50 mg daily)  -F/U 2wks for recheck and if still elevated will add/adjust medicine  - CBC WITH AUTOMATED DIFF  - METABOLIC PANEL, COMPREHENSIVE    3. Fatty liver  Noted on US in 2017  -weight had gone down 2018, but is now back up again  -never saw Dr. Kaushik Barros as previously referred by Dr. Eric Bobby  -Liver enzymes improving at last check 2/27/2019, AST normal at 28 (previously 48), ALT 53 (previously 79.  -continue low fat diet  -she is sedentary, exercise encouraged  - METABOLIC PANEL, COMPREHENSIVE    4.  High triglycerides  lipitor 40 mg, generally follows a low fat, low sugar diet; LDL 85 (previously 94),  (previously 258)      Health Maintenance reviewed - updated. Orders Placed This Encounter    HEMOGLOBIN A1C WITH EAG    CBC WITH AUTOMATED DIFF    METABOLIC PANEL, COMPREHENSIVE       There are no discontinued medications. Current Outpatient Medications   Medication Sig Dispense Refill    metFORMIN ER (GLUCOPHAGE XR) 500 mg tablet TAKE 1 TABLET BY MOUTH EVERY DAY 90 Tab 1    atorvastatin (LIPITOR) 40 mg tablet Take 1 Tab by mouth daily. 90 Tab 3    losartan (COZAAR) 50 mg tablet TAKE 1 TABLET BY MOUTH EVERY DAY 90 Tab 3    omeprazole (PRILOSEC) 10 mg capsule TAKE 1 CAPSULE BY MOUTH EVERY DAY 90 Cap 1    meloxicam (MOBIC) 15 mg tablet TAKE 1 TABLET BY MOUTH EVERY DAY AS NEEDED 90 Tab 1    albuterol (PROVENTIL HFA, VENTOLIN HFA, PROAIR HFA) 90 mcg/actuation inhaler Take 2 Puffs by inhalation every six (6) hours as needed for Wheezing. 1 Inhaler 0    cholecalciferol, vitamin D3, (VITAMIN D3) 2,000 unit Tab Take 5,000 Int'l Units by mouth daily. Recommended healthy diet low in carbohydrates, fats, sodium and cholesterol. Recommended regular cardiovascular exercise 3-6 times per week for 30-60 minutes daily. Verbal and written instructions (see AVS) provided. Patient expresses understanding of diagnosis and treatment plan. Follow-up and Dispositions    · Return in about 2 weeks (around 9/9/2019) for HTN/BP check. No future appointments.

## 2019-08-27 LAB
ALBUMIN SERPL-MCNC: 4.9 G/DL (ref 3.5–5.5)
ALBUMIN/GLOB SERPL: 2 {RATIO} (ref 1.2–2.2)
ALP SERPL-CCNC: 144 IU/L (ref 39–117)
ALT SERPL-CCNC: 115 IU/L (ref 0–32)
AST SERPL-CCNC: 59 IU/L (ref 0–40)
BASOPHILS # BLD AUTO: 0 X10E3/UL (ref 0–0.2)
BASOPHILS NFR BLD AUTO: 1 %
BILIRUB SERPL-MCNC: 0.5 MG/DL (ref 0–1.2)
BUN SERPL-MCNC: 15 MG/DL (ref 6–24)
BUN/CREAT SERPL: 20 (ref 9–23)
CALCIUM SERPL-MCNC: 9.5 MG/DL (ref 8.7–10.2)
CHLORIDE SERPL-SCNC: 98 MMOL/L (ref 96–106)
CO2 SERPL-SCNC: 24 MMOL/L (ref 20–29)
CREAT SERPL-MCNC: 0.74 MG/DL (ref 0.57–1)
EOSINOPHIL # BLD AUTO: 0.2 X10E3/UL (ref 0–0.4)
EOSINOPHIL NFR BLD AUTO: 3 %
ERYTHROCYTE [DISTWIDTH] IN BLOOD BY AUTOMATED COUNT: 12.5 % (ref 12.3–15.4)
EST. AVERAGE GLUCOSE BLD GHB EST-MCNC: 183 MG/DL
GLOBULIN SER CALC-MCNC: 2.5 G/DL (ref 1.5–4.5)
GLUCOSE SERPL-MCNC: 150 MG/DL (ref 65–99)
HBA1C MFR BLD: 8 % (ref 4.8–5.6)
HCT VFR BLD AUTO: 41.9 % (ref 34–46.6)
HGB BLD-MCNC: 14.1 G/DL (ref 11.1–15.9)
IMM GRANULOCYTES # BLD AUTO: 0 X10E3/UL (ref 0–0.1)
IMM GRANULOCYTES NFR BLD AUTO: 0 %
LYMPHOCYTES # BLD AUTO: 2.7 X10E3/UL (ref 0.7–3.1)
LYMPHOCYTES NFR BLD AUTO: 34 %
MCH RBC QN AUTO: 29.7 PG (ref 26.6–33)
MCHC RBC AUTO-ENTMCNC: 33.7 G/DL (ref 31.5–35.7)
MCV RBC AUTO: 88 FL (ref 79–97)
MONOCYTES # BLD AUTO: 0.6 X10E3/UL (ref 0.1–0.9)
MONOCYTES NFR BLD AUTO: 7 %
NEUTROPHILS # BLD AUTO: 4.6 X10E3/UL (ref 1.4–7)
NEUTROPHILS NFR BLD AUTO: 55 %
PLATELET # BLD AUTO: 245 X10E3/UL (ref 150–450)
POTASSIUM SERPL-SCNC: 4.8 MMOL/L (ref 3.5–5.2)
PROT SERPL-MCNC: 7.4 G/DL (ref 6–8.5)
RBC # BLD AUTO: 4.74 X10E6/UL (ref 3.77–5.28)
SODIUM SERPL-SCNC: 139 MMOL/L (ref 134–144)
WBC # BLD AUTO: 8.1 X10E3/UL (ref 3.4–10.8)

## 2019-08-30 ENCOUNTER — TELEPHONE (OUTPATIENT)
Dept: FAMILY MEDICINE CLINIC | Age: 59
End: 2019-08-30

## 2019-08-30 DIAGNOSIS — R79.89 ELEVATED LFTS: Primary | ICD-10-CM

## 2019-08-30 NOTE — TELEPHONE ENCOUNTER
Verified two paint identifiers, name and . Patient provided with lab results.  No questions at this time   lab appt 10/11/19 9a

## 2019-08-30 NOTE — PROGRESS NOTES
Elevated ALP, AST, ALT. Since ALT more than 3 times elevated, stop statin. Recheck 6 wks. A1C now 8%. Follow strict diet. Work on weight loss. Will not increase metformin until hepatic panel rechecked in 6 wks. F/U office visit 3 months. [employer] message and phone call.

## 2019-08-30 NOTE — TELEPHONE ENCOUNTER
I sent her a [employer] message, but I do not know how often she checks it, so please call her with this message, since I need her to stop her \"statin\". My exact my chart message copied below. Your liver enzymes are back up again. Since the ALT is more than 3 times normal, I need to stop your atorvastatin, and recheck your labs in 6 weeks. Also, the fatty liver disease contributes to this, so work on weight loss. Follow low fat diet and begin walking. The diabetes control is worse, A1C now 8%. Again, work on your diet closely, schedule the education class. Normally I would also increase the metformin dose, but need to see if the liver enzymes improve first when checked in 6 weeks. Lab order entered in computer, make \"lab only\" appt for 6 wks. Also F/U office visit appt in 3 months.

## 2019-09-04 ENCOUNTER — OFFICE VISIT (OUTPATIENT)
Dept: FAMILY MEDICINE CLINIC | Age: 59
End: 2019-09-04

## 2019-09-04 VITALS
DIASTOLIC BLOOD PRESSURE: 89 MMHG | HEIGHT: 65 IN | RESPIRATION RATE: 18 BRPM | OXYGEN SATURATION: 99 % | HEART RATE: 80 BPM | SYSTOLIC BLOOD PRESSURE: 158 MMHG | TEMPERATURE: 97.1 F | BODY MASS INDEX: 30.32 KG/M2 | WEIGHT: 182 LBS

## 2019-09-04 DIAGNOSIS — I10 ESSENTIAL HYPERTENSION: Primary | ICD-10-CM

## 2019-09-04 DIAGNOSIS — R79.89 ELEVATED LFTS: ICD-10-CM

## 2019-09-04 DIAGNOSIS — E78.5 DYSLIPIDEMIA: ICD-10-CM

## 2019-09-04 DIAGNOSIS — E11.9 CONTROLLED TYPE 2 DIABETES MELLITUS WITHOUT COMPLICATION, WITHOUT LONG-TERM CURRENT USE OF INSULIN (HCC): ICD-10-CM

## 2019-09-04 RX ORDER — LOSARTAN POTASSIUM 100 MG/1
100 TABLET ORAL DAILY
Qty: 90 TAB | Refills: 1 | Status: SHIPPED | OUTPATIENT
Start: 2019-09-04 | End: 2020-02-10

## 2019-09-04 RX ORDER — METFORMIN HYDROCHLORIDE 500 MG/1
500 TABLET, EXTENDED RELEASE ORAL 2 TIMES DAILY WITH MEALS
Qty: 180 TAB | Refills: 1 | Status: SHIPPED | OUTPATIENT
Start: 2019-09-04 | End: 2020-02-12 | Stop reason: SDUPTHER

## 2019-09-04 NOTE — PROGRESS NOTES
Subjective:     Faiza Levine is a 61 y.o. female who presents for follow up of hypertension. On losartan 50 mg daily. Her BP was up at last visit, but she had recently traveled and been on high salt diet, so I asked her to follow low salt diet and return for rehceck. She is taking medications as directed. She generally follows a low sodium diet since last visit. She also bought a home BP monitor. Home BP Monitoring 150s/80s. She denies chest pain, headaches, shortness of breath, vision change and lower extremity edema. Reviewing labs from last week, she received phone call, and has stopped her atorvastatin due to ALT up more than 3 times normal.    A1C was up to 8%, so we'll increase her metformin 500 mg to 1000 mg. She leaves in 2 days to go out of town for a month, nephew's wedding and grand-daughter's birthday celebrations. Past Medical History:   Diagnosis Date    Advanced care planning/counseling discussion 5/24/16    Allergic rhinitis, cause unspecified 10/2003    Atypical squamous cell changes of undetermined significance (ASCUS) on vaginal cytology 2/13/2018    Breast mass, right 10/12/11    Right. Dr. Kaushik Whitley. benign. .    Carpal tunnel syndrome 09/2012    bilaterally. Dr. Katia Strange.  Chest pain 03/2012    Dr. Linda Romano    Elevated alkaline phosphatase level 05/16/04    125    Essential hypertension, benign 09/18/12    Fibrocystic breast changes     Heel spur 2010    Left. Dr. Celestina Manley    Herpes zoster 07/2003    Right chest.    Hyperglycemia 8/23/2012    Iron deficiency anemia 10/2003    Menorrhagia 2006    due to endometrial polyps. Dr. Preethi Vidales Other and unspecified hyperlipidemia 7682    Ovarian follicular cyst 66/15/27    bilaterally.  Positive FIT (fecal immunochemical test) 2/22/2018    Tennis elbow 09/2012    Right. Dr. Lazarus Shah.  Trigger finger 09/2012    Left thumb. Right middle. Dr. Lazarus Shah.     Unspecified vitamin D deficiency 04/2009     Social History     Tobacco Use    Smoking status: Never Smoker    Smokeless tobacco: Never Used   Substance Use Topics    Alcohol use: No     family history includes Arthritis-osteo in her mother; Asthma in her mother and sister; Diabetes in her brother and father; Elevated Lipids in her mother; Heart Attack (age of onset: 72) in her father; Heart Disease in her father; Heart Disease (age of onset: 61) in her brother; High Cholesterol in her mother; Hypertension in her brother, father, and mother; Migraines in her sister; No Known Problems in her daughter, daughter, daughter, and son. Outpatient Medications Marked as Taking for the 9/4/19 encounter (Office Visit) with Cris Delarosa PA-C   Medication Sig Dispense Refill    metFORMIN ER (GLUCOPHAGE XR) 500 mg tablet TAKE 1 TABLET BY MOUTH EVERY DAY 90 Tab 1    losartan (COZAAR) 50 mg tablet TAKE 1 TABLET BY MOUTH EVERY DAY 90 Tab 3    omeprazole (PRILOSEC) 10 mg capsule TAKE 1 CAPSULE BY MOUTH EVERY DAY 90 Cap 1    meloxicam (MOBIC) 15 mg tablet TAKE 1 TABLET BY MOUTH EVERY DAY AS NEEDED 90 Tab 1    cholecalciferol, vitamin D3, (VITAMIN D3) 2,000 unit Tab Take 5,000 Int'l Units by mouth daily. Allergies   Allergen Reactions    Pcn [Penicillins] Hives    Lisinopril Cough     Dry cough    Nsaids (Non-Steroidal Anti-Inflammatory Drug) Other (comments)     Abdominal pain       Review of Systems:  GEN: no weight loss, weight gain, fatigue or night sweats  CV: no PND, orthopnea, or palpitations  Resp: no wheeze, no cough  Abd: no nausea, vomiting or diarrhea  Endocrine: no hair loss, excessive thirst or polyuria    Objective:     Visit Vitals  /89 (BP 1 Location: Left arm, BP Patient Position: Sitting)   Pulse 80   Temp 97.1 °F (36.2 °C) (Oral)   Resp 18   Ht 5' 5\" (1.651 m)   Wt 182 lb (82.6 kg)   LMP 03/04/2012   SpO2 99%   BMI 30.29 kg/m²     General:   alert, cooperative and no distress   Eyes: conjunctivae/sclerae clear.  PERRL, EOM's intact   Heart: S1 and S2 normal,no murmurs noted    Lungs: Clear to auscultation bilaterally, no increased work of breathing   Abdomen: Soft, nontender. Normal bowel sounds   Extremities: No edema or cyanosis           Assessment/Plan:       ICD-10-CM ICD-9-CM    1. Essential hypertension - Not controlled today, 158/89, ([id number]), increase losartan to 100 mg, she goes out of town in 2 days and will gone for a month. She will monitor with her her BP cuff and send me results via [employer]. Follow up when she returns. I10 401.9 losartan (COZAAR) 100 mg tablet      [facility]   2. Controlled type 2 diabetes mellitus without complication, without long-term current use of insulin ([facility]) - A1C was up to 8% (previously 6.7% 6.4%); increase metformin ER from 500 mg to 1000 mg at dinner (will split dose BID with meals at first for tolerability)    E11.9 250.00 metFORMIN ER (GLUCOPHAGE XR) 500 mg tablet   3. Elevated LFTs - elevated again, AST 59 (previously 28, 48),  (previously 53, 79). Fatty liver noted on US in 2017  -weight had gone down 2018, but is now back up again  -never saw Dr. Kaushik Barros as previously referred by Dr. Eric Bobby  -with ALT more than 3x normal I have stopped her statin, she has appt to recheck labs on 10/11/2019; once she returns from out of town, she will schedule with Dr. Kaushik Barros.   R94.5 790.6    4. Dyslipidemia - had to stop statin due to elevated ALT, advised to take fish oil 1000 mg BID; last lipids 2/28/2019 total 170, HDL 40, LDL 85, TG elevated at 223; if transaminases improve off atorvastatin, then may cautiously use pravastatin and monitor E78.5 272.4          Recommended healthy diet low in carbohydrates, fats, sodium and cholesterol. Recommended regular cardiovascular exercise 3-6 times per week for 30-60 minutes daily. Verbal and written instructions (see AVS) provided. Patient expresses understanding of diagnosis and treatment plan.        Future Appointments   Date Time Provider Remi Moody   10/11/2019  9:00 AM LAB [facility] NICK QURESHI   10/16/2019  8:00 AM Cris Delarosa PA-C [facility] SCHED

## 2019-09-04 NOTE — PATIENT INSTRUCTIONS
Increase the metformin 500 mg tablet to 2 pills at dinner (total dose 1000 mg). If this gives you diarrhea, then do 1 pill at breakfast and 1 pill at dinner. If you tolerate the 2 pills at dinner, then at your next visit, I can switch you to the 1000 mg pill    Increase the losartan to 100 mg daily, new prescription pill sent to your pharmacy. EXERCISE 150 MINUTES WEEKLY. THIS CAN BE ACHIEVED BY WORKING OUT OR WALKING A MINIMUM OF 30 MINUTES FOR 5 DAYS WEEKLY. YOU CAN EXERCISE IN INCREMENTS OF 10-15 MINUTES UP TO 3 TIMES A DAY. Consider performing \"brainless exercise. \"  Choose your favorite tv program.  Five minutes before and for 5 minutes after the tv program, stretch your body. While the program is on, walk in place watching the show. When commercials come on, rest or walk around the house to do other things. When the program begins, return to walking in place. When you are able keep walking during the commercials and add light weights to your ankles or hands. By the end of the show, you would have walked 30 minutes. If you need shorter spurts of exercise, walk during the commercials and rest during the show. Drink to glasses of water prior to any exercise to prevent dehydration and to improve the results of the work out.

## 2019-10-04 DIAGNOSIS — R79.89 ELEVATED LFTS: ICD-10-CM

## 2019-11-06 DIAGNOSIS — R79.89 ELEVATED LFTS: ICD-10-CM

## 2019-11-06 DIAGNOSIS — K76.0 FATTY LIVER: Primary | ICD-10-CM

## 2019-11-06 LAB
ALBUMIN SERPL-MCNC: 4.6 G/DL (ref 3.5–5.5)
ALP SERPL-CCNC: 124 IU/L (ref 39–117)
ALT SERPL-CCNC: 175 IU/L (ref 0–32)
AST SERPL-CCNC: 96 IU/L (ref 0–40)
BILIRUB DIRECT SERPL-MCNC: 0.1 MG/DL (ref 0–0.4)
BILIRUB SERPL-MCNC: 0.3 MG/DL (ref 0–1.2)
PROT SERPL-MCNC: 6.8 G/DL (ref 6–8.5)

## 2019-11-06 NOTE — PROGRESS NOTES
Please notify that her liver enzymes continue to go up. I would like her to schedule with the liver specialist, Dr. Willie Duran, as previously discussed. I put in new referral, please give her the info.

## 2019-11-13 ENCOUNTER — OFFICE VISIT (OUTPATIENT)
Dept: FAMILY MEDICINE CLINIC | Age: 59
End: 2019-11-13

## 2019-11-13 VITALS
HEIGHT: 65 IN | TEMPERATURE: 97.9 F | WEIGHT: 177 LBS | BODY MASS INDEX: 29.49 KG/M2 | SYSTOLIC BLOOD PRESSURE: 124 MMHG | HEART RATE: 82 BPM | RESPIRATION RATE: 16 BRPM | OXYGEN SATURATION: 95 % | DIASTOLIC BLOOD PRESSURE: 70 MMHG

## 2019-11-13 DIAGNOSIS — E78.5 DYSLIPIDEMIA: ICD-10-CM

## 2019-11-13 DIAGNOSIS — E78.1 HIGH TRIGLYCERIDES: ICD-10-CM

## 2019-11-13 DIAGNOSIS — R79.89 ELEVATED LFTS: ICD-10-CM

## 2019-11-13 DIAGNOSIS — E11.65 UNCONTROLLED TYPE 2 DIABETES MELLITUS WITH HYPERGLYCEMIA (HCC): ICD-10-CM

## 2019-11-13 DIAGNOSIS — K76.0 FATTY LIVER: ICD-10-CM

## 2019-11-13 DIAGNOSIS — Z12.11 COLON CANCER SCREENING: ICD-10-CM

## 2019-11-13 DIAGNOSIS — K21.9 GASTROESOPHAGEAL REFLUX DISEASE, ESOPHAGITIS PRESENCE NOT SPECIFIED: ICD-10-CM

## 2019-11-13 DIAGNOSIS — I10 ESSENTIAL HYPERTENSION: Primary | ICD-10-CM

## 2019-11-13 RX ORDER — OMEPRAZOLE 10 MG/1
CAPSULE, DELAYED RELEASE ORAL
Qty: 90 CAP | Refills: 1 | Status: SHIPPED | OUTPATIENT
Start: 2019-11-13 | End: 2020-05-02

## 2019-11-13 NOTE — PROGRESS NOTES
Chief Complaint   Patient presents with    Hypertension     follow up    Labs         1. Have you been to the ER, urgent care clinic since your last visit? Hospitalized since your last visit? no    2. Have you seen or consulted any other health care providers outside of the [address] [place] [address] since your last visit? Include any pap smears or colon screening.   no

## 2019-11-13 NOTE — PATIENT INSTRUCTIONS
Take fish oil 1000 mg twice daily for the cholesterol. Try these lifestyle strategies to lower your cholesterol:   Decrease saturated fats in diet. Saturated fats are found in:   o meats including fatty beef, pork, lamb, chicken or turkey with skin, and ground beef,   o high fat cheese,   o whole fat diary, milk, cream and ice cream,  o butter  o most saturated fats are found in animal products   Eliminate Trans Fats from your diet. Foods that contain TF include:  o Fast foods: fried chicken, biscuits, fried fish for fried fish sandwichs, Western Erma fries  o Donuts and muffins  o Crackers and cookies  o Cake, cake icing and pies  o Canned biscuits or refrigerated dough  o Microwave popcorn  o Coffee creamer  o Frozen pizza  o Stick margarine or vegetable shortening   Increase the amount of soluble fiber in your diet. Sources of soluble fiber include:  o oats, peas, beans, apples, citrus fruits, carrots, barley and psyllium   Include omega-3 fatty acids in your diet, these are found in:   o FISH! Try and eat 2 servings of fish a week. Good examples include salmon, albacore tuna, halibut, trout and mackerel.  o Take a fish oil supplement daily   Look for foods enriched with plant-sterols. There are many products on the market which are fortified with this nutrient including buttery spreads and yogurts. Look for the Benechol and Promise brands.  Increase your physical activity to at least 150 minutes a week. This is just 5 sessions of thirty minutes a week!  Losing weight can significantly lower your cholesterol.  If you are a smoker, QUIT SMOKING, this can significantly lower your cholesterol and overall cardiovascular risk. It also can help to decrease your risk for many other conditions.

## 2019-11-13 NOTE — PROGRESS NOTES
HPI:  61 y.o.  presents for follow up appointment. No hospital, ER or specialist visits since last primary care visit except as noted below. Hypertension - at last visit 9/04/2019, /89, so I increased losartan from 50 mg to 100 mg. compliant with medication, home monitoring shows 140s-150s/70s. No history of heart disease or stroke. No chest pain, no shortness of breath, no headaches, no lower extremity swelling. CHL - was on atorvastatin, but I had to stop it due to ALT up more than 3 times normal on labs 8/26/2019. Unfortunately labs rechecked in November, and enzymes continued to rise. She is not taking fish oil. Liver enzymes continued to rise on recheck of labs 11/05/2019. She has scheduled initial visit at Dr. Sonny Meadows with NP Nyla Gonzales for 12/04/2019. She recently traveled to visit family over 1 month and did a lot of eating out. She does not drink alcohol. She has lost 5 lbs since being back home and on her home diet. DM: Checks blood glucose each morning and usually 145. Takes medications as directed. No polyuria/polydipsia. Opthalmology appointment this year 10/2019 with Dr. Simon Warner. Checks feet, and no sores noted. No tingling or numbness in feet. Her A1C was up to 8%, so we increased from metformin xr 500 mg to 1000 mg daily. She is taking both tabs at dinner; she had some diarrhea doing that so she stopped it for a month. She states she has now been doing metformin xr 1000 mg at dinner  Now x 1 month,  it is better now, but still has some abdominal cramping at times. She needs refill of omeprazole 10 mg, takes this every night. Needs referral for colonoscopy    She started treadmill at home, 5 day / week last week. She is slowly increasing up from 10 min at a time, and is now at 30 min. She stopped white bread and switched to whole wheat bread and brown rice.      Patient Active Problem List    Diagnosis    Positive FIT (fecal immunochemical test)    Fatty liver With elevated LFTs      Type 2 diabetes mellitus without complication, without long-term current use of insulin (HCC)    High triglycerides    Hypertension    Hyperglycemia    Back pain     Left possibly due to musculoskeletal      Leg pain, left    Heel spur     Left. Dr. Marylene Navy Fibrocystic breast changes    Other and unspecified hyperlipidemia    Vitamin D deficiency    Iron deficiency anemia         Past Medical History:   Diagnosis Date    Advanced care planning/counseling discussion 5/24/16    Allergic rhinitis, cause unspecified 10/2003    Atypical squamous cell changes of undetermined significance (ASCUS) on vaginal cytology 2/13/2018    Breast mass, right 10/12/11    Right. Dr. Jonny Alvarenga. benign. .    Carpal tunnel syndrome 09/2012    bilaterally. Dr. Venancio Wheeler.  Chest pain 03/2012    Dr. Verónica Jones    Elevated alkaline phosphatase level 05/16/04    125    Essential hypertension, benign 09/18/12    Fibrocystic breast changes     Heel spur 2010    Left. Dr. Alie Paz    Herpes zoster 07/2003    Right chest.    Hyperglycemia 8/23/2012    Iron deficiency anemia 10/2003    Menorrhagia 2006    due to endometrial polyps. Dr. Rupert Low Other and unspecified hyperlipidemia 4936    Ovarian follicular cyst 01/53/40    bilaterally.  Positive FIT (fecal immunochemical test) 2/22/2018    Tennis elbow 09/2012    Right. Dr. Linena Cortez.  Trigger finger 09/2012    Left thumb. Right middle. Dr. Linnea Cortez.  Unspecified vitamin D deficiency 04/2009       Social History     Tobacco Use    Smoking status: Never Smoker    Smokeless tobacco: Never Used   Substance Use Topics    Alcohol use: No    Drug use: No       Outpatient Medications Marked as Taking for the 11/13/19 encounter (Office Visit) with Cris Delarosa PA-C   Medication Sig Dispense Refill    losartan (COZAAR) 100 mg tablet Take 1 Tab by mouth daily.  90 Tab 1    metFORMIN ER (GLUCOPHAGE XR) 500 mg tablet Take 1 Tab by mouth two (2) times daily (with meals). TAKE 1 TABLET BY MOUTH EVERY  Tab 1    omeprazole (PRILOSEC) 10 mg capsule TAKE 1 CAPSULE BY MOUTH EVERY DAY 90 Cap 1    meloxicam (MOBIC) 15 mg tablet TAKE 1 TABLET BY MOUTH EVERY DAY AS NEEDED 90 Tab 1    cholecalciferol, vitamin D3, (VITAMIN D3) 2,000 unit Tab Take 5,000 Int'l Units by mouth daily. Allergies   Allergen Reactions    Pcn [Penicillins] Hives    Lisinopril Cough     Dry cough    Nsaids (Non-Steroidal Anti-Inflammatory Drug) Other (comments)     Abdominal pain       ROS:  ROS negative except as per HPI. PE:  Visit Vitals  /70 (BP 1 Location: Right arm, BP Patient Position: Sitting)   Pulse 82   Temp 97.9 °F (36.6 °C) (Oral)   Resp 16   Ht 5' 5\" (1.651 m)   Wt 177 lb (80.3 kg)   LMP 03/04/2012   SpO2 95%   BMI 29.45 kg/m²     Weight down 5 lbs from 9/04/2019    Gen: alert, oriented, no acute distress  Head: normocephalic, atraumatic  Neck: symmetric normal sized thyroid, no carotid bruits, no lymphadenopathy  Resp: no increase work of breathing, lungs clear to ausculation bilaterally, no wheezing, rales or rhonchi  CV: S1, S2 normal.  No murmurs, rubs, or gallops. Abd: soft, not tender, not distended. Normal bowel sounds. Neuro: not focal.  Skin: no lesion or rash  Extremities: no cyanosis or edema    No results found for this visit on 11/13/19. Assessment/Plan:      ICD-10-CM ICD-9-CM    1. Essential hypertension - 124/70 (prerviously 158/89, 150/88). Now on losartan 100 mg daily (iincreased from 50 mg 9/04/2019). - well controlled. Continue current medication. Exercise, and low salt/ low caffeine diet were discussed. H87 911.6 METABOLIC PANEL, COMPREHENSIVE      CBC WITH AUTOMATED DIFF   2.  Uncontrolled type 2 diabetes mellitus with hyperglycemia (HCC) - A1C was up to 8% (previously 6.7% 6.4%); increased metformin ER from 500 mg to 1000 mg at dinner, initially had abdominal pain and diarrhea so she stopped medicine completely for a month while traveling. She states she is not tolerating metformin xr 1000 mg at dinner but still has some GI cramping. She has not tried BID dosing as previously suggested. I advise she may split dose BID with meals for tolerability if needed. E11.65 250.02 HEMOGLOBIN A1C WITH EAG   3. Gastroesophageal reflux disease, esophagitis presence not specified - on omeprazole 10 mg QHS, consider slow taper in the future   K21.9 530.81 omeprazole (PRILOSEC) 10 mg capsule   4. High triglycerides - off statin since August 2019 due to elevated transaminases, work up pending, meanwhile, start Fish Oil 1000 mg BID   E78.1 272.1 LIPID PANEL   5. Dyslipidemia - had to stop statin due to elevated ALT, again advised to take fish oil 1000 mg BID as previously recommended; last lipids 2/28/2019 total 170, HDL 40, LDL 85, TG elevated at 223; unable to resume statin since enzymes went up further on recheck of labs in November after being off statin   E78.5 272.4 LIPID PANEL   6. Fatty liver - noted on US in 2017   K76.0 571.8 US ABD LTD   7. Elevated LFTs - elevated again, AST 96 (previously 59, 28, 48),  (previously 115, 53, 79) Alk Phos 124 (previously 144).   -she does not drink alcohol  - I stopped her statin in 8/30/2019 due to ALT more than 3 times normal, and on recheck her enzymes went up yet again  -of note, between labs from 8/26/2019 and 11/05/2019, she was traveling, eating out and had stopped her metformin due to GI distress, which would increase her risk of worsening fatty liver  - Fatty liver noted on US in 2017  -weight had gone down 2018, but is now back up again  -never saw Dr. Sandie Chanel as previously referred by Dr. Yaya Montoya 2 yrs ago  -She has scheduled initial visit at Dr. Patricia Sharpe with NP Ashanti Keating for 12/04/2019.    -will recheck liver US  -of note, she has lost 5 lbs since being home from vacation, has changed her diet and started exercising!! I praised her efforts and encouraged to continue!!  -recommended a goal of 5% weight loss, so try to lose 8.5 lbs   R94.5 790.6 US ABD LTD      METABOLIC PANEL, COMPREHENSIVE   8. Colon cancer screening - she has never had colonoscopy Z12.11 V76.51 REFERRAL TO [address] [place] [address] Maintenance reviewed - updated. Current Outpatient Medications   Medication Sig Dispense Refill    omeprazole (PRILOSEC) 10 mg capsule TAKE 1 CAPSULE BY MOUTH EVERY DAY 90 Cap 1    losartan (COZAAR) 100 mg tablet Take 1 Tab by mouth daily. 90 Tab 1    metFORMIN ER (GLUCOPHAGE XR) 500 mg tablet Take 1 Tab by mouth two (2) times daily (with meals). TAKE 1 TABLET BY MOUTH EVERY  Tab 1    meloxicam (MOBIC) 15 mg tablet TAKE 1 TABLET BY MOUTH EVERY DAY AS NEEDED 90 Tab 1    cholecalciferol, vitamin D3, (VITAMIN D3) 2,000 unit Tab Take 5,000 Int'l Units by mouth daily.  atorvastatin (LIPITOR) 40 mg tablet Take 1 Tab by mouth daily. 90 Tab 3    albuterol (PROVENTIL HFA, VENTOLIN HFA, PROAIR HFA) 90 mcg/actuation inhaler Take 2 Puffs by inhalation every six (6) hours as needed for Wheezing. 1 Inhaler 0       Recommended healthy diet low in carbohydrates, fats, sodium and cholesterol. Recommended regular cardiovascular exercise 3-6 times per week for 30-60 minutes daily. Verbal and written instructions (see AVS) provided. Patient expresses understanding of diagnosis and treatment plan. Follow-up and Dispositions    · Return in about 3 months (around 2/13/2020) for HTN, DM; fasting labs in late Jan 2020.        Future Appointments   Date Time Provider Remi Moody   12/4/2019 12:45 PM Dilan Rosario., NP [facility] SCHED   1/28/2020  9:00 AM [facility] SCHED   2/3/2020 11:00 AM Cris Delarosa, PABaldomeroC [facility] SCHED

## 2019-11-22 ENCOUNTER — HOSPITAL ENCOUNTER (OUTPATIENT)
Dept: ULTRASOUND IMAGING | Age: 59
Discharge: HOME | End: 2019-11-22
Payer: COMMERCIAL

## 2019-11-22 ENCOUNTER — HOSPITAL ENCOUNTER (OUTPATIENT)
Dept: MAMMOGRAPHY | Age: 59
Discharge: HOME | End: 2019-11-22
Payer: COMMERCIAL

## 2019-11-22 DIAGNOSIS — R79.89 ELEVATED LFTS: ICD-10-CM

## 2019-11-22 DIAGNOSIS — Z12.31 VISIT FOR SCREENING MAMMOGRAM: ICD-10-CM

## 2019-11-22 DIAGNOSIS — K76.0 FATTY LIVER: ICD-10-CM

## 2019-11-22 PROCEDURE — 76705 ECHO EXAM OF ABDOMEN: CPT

## 2019-11-22 PROCEDURE — 77063 BREAST TOMOSYNTHESIS BI: CPT

## 2019-11-25 DIAGNOSIS — M76.60 ACHILLES TENDON PAIN: ICD-10-CM

## 2019-11-26 ENCOUNTER — TELEPHONE (OUTPATIENT)
Dept: FAMILY MEDICINE CLINIC | Age: 59
End: 2019-11-26

## 2019-11-26 NOTE — PROGRESS NOTES
Left voicemail for patient on number listed in the chart to return call to the office.    Sent MumsWayt as well

## 2019-11-26 NOTE — PROGRESS NOTES
Please notify that liver US shows fatty liver and gallbladder sludge. Follow low fat diet in effort for weight loss. Follow up with liver specialist as planned.

## 2019-11-26 NOTE — TELEPHONE ENCOUNTER
----- Message from Nadir Vega sent at 11/26/2019  9:24 AM EST -----  Regarding: JURGEN Delarosa/Telehone      Patient returned call from practice regarding test results.  Best contact number is [phone]

## 2019-11-26 NOTE — TELEPHONE ENCOUNTER
----- Message from Can Arvizu sent at 11/26/2019 12:21 PM EST -----  Regarding: NP [place]/Refill  Best contact number(s): [phone]   Name of medication and dosage if known: Meloxicam, 15mg  Is patient out of this medication (yes/no): yes  Pharm[facility] name: [facility], [address] listed in chart? (yes/no): yes  Pharmacy phone number: [phone]  Date of last visit: 11/13/19  Details to clarify the request: Previous doctor used to prescribe this medication, but no longer accepts her insurance. Ran out of temporary medication as well.

## 2019-11-27 RX ORDER — MELOXICAM 15 MG/1
TABLET ORAL
Qty: 90 TAB | Refills: 0 | OUTPATIENT
Start: 2019-11-27

## 2019-11-27 NOTE — TELEPHONE ENCOUNTER
Verified two patient identifiers, name and date of birth.  Patient informed of amys note about the mobic refusal.

## 2019-11-27 NOTE — TELEPHONE ENCOUNTER
PCP: Will Patel PA-C    Last appt: 11/13/2019  Future Appointments   Date Time Provider Remi Parrai   12/16/2019  3:15 PM Ady Martin, NP [facility] SCH[facility]   1/28/2020  9:00 AM LAB [facility] SCH[facility]   2/3/2020 11:00 AM Cris Delarosa PA-C [facility] SCH[facility]       Requested Prescriptions     Pending Prescriptions Disp Refills    meloxicam (MOBIC) 15 mg tablet 90 Tab 0     Sig: TAKE 1 TABLET BY MOUTH EVERY DAY AS NEEDED

## 2019-11-27 NOTE — TELEPHONE ENCOUNTER
Please inform her that since her liver enzymes are elevated more than twice the normal limit, I don't want her to take this medication right now until she sees liver specialist.

## 2019-12-16 ENCOUNTER — OFFICE VISIT (OUTPATIENT)
Dept: HEMATOLOGY | Age: 59
End: 2019-12-16

## 2019-12-16 VITALS
BODY MASS INDEX: 29.16 KG/M2 | WEIGHT: 175 LBS | HEART RATE: 78 BPM | DIASTOLIC BLOOD PRESSURE: 76 MMHG | HEIGHT: 65 IN | SYSTOLIC BLOOD PRESSURE: 136 MMHG | OXYGEN SATURATION: 97 % | TEMPERATURE: 98.6 F

## 2019-12-16 DIAGNOSIS — R79.89 ELEVATED LFTS: Primary | ICD-10-CM

## 2019-12-16 NOTE — PROGRESS NOTES
Genny Norwood is a 61 y.o. female  Chief Complaint   Patient presents with    New Patient     fatty Liver         Visit Vitals  /76 (BP 1 Location: Left arm, BP Patient Position: Sitting)   Pulse 78   Temp 98.6 °F (37 °C) (Tympanic)   Ht 5' 5\" (1.651 m)   Wt 175 lb (79.4 kg)   SpO2 97%   BMI 29.12 kg/m²     3 most recent PHQ Screens 8/26/2019   Little interest or pleasure in doing things Not at all   Feeling down, depressed, irritable, or hopeless Not at all   Total Score PHQ 2 0     Learning Assessment 8/26/2019   PRIMARY LEARNER Patient   HIGHEST LEVEL OF EDUCATION - PRIMARY LEARNER  > 4 YEARS Cheryl PRIMARY LEARNER NONE   CO-LEARNER CAREGIVER -   PRIMARY LANGUAGE ENGLISH   LEARNER PREFERENCE PRIMARY LISTENING     -   ANSWERED BY Patient   RELATIONSHIP SELF     Abuse Screening Questionnaire 8/26/2019   Do you ever feel afraid of your partner? N   Are you in a relationship with someone who physically or mentally threatens you? N   Is it safe for you to go home?  Clau Spears

## 2019-12-16 NOTE — PROGRESS NOTES
3340 [facility], MD, [address]17 [address], Cite Diamante Engel, MD Natasha Palacios PA-C Janeann Barters, [facility]     Krystin Frankel, [facility]   Don Winn P-DINESH Moore, [facility]       [facility] 136    at [address], [address] [place] [address], [facility] 22.    [phone]    FAX: [address] [place] [address], [address]    [phone]    FAX: [phone]     Patient Care Team:  Aidee Hearn as PCP - General (Physician Assistant)  Deneen Saul PA-C as PCP - [facility]  Roger Boggs MD as Physician (Orthopedic Surgery)  Surya Walker (Optometry)     Patient Active Problem List   Diagnosis Code    Vitamin D deficiency E55.9    Heel spur M77.30    Iron deficiency anemia D50.9    Fibrocystic breast changes N60.19    Other and unspecified hyperlipidemia E78.5    Back pain M54.9    Leg pain, left M79.605    Hyperglycemia R73.9    Hypertension I10    Type 2 diabetes mellitus without complication, without long-term current use of insulin (HCC) E11.9    High triglycerides E78.1    Fatty liver K76.0    Positive FIT (fecal immunochemical test) R19.5    Elevated LFTs R94.5     The clinicians listed above have asked me to see Verito Gee in consultation regarding suspected fatty liver disease and its management. All medical records sent by the referring physicians were reviewed including imaging studies. The patient is a 61 y.o.  female who is suspected to have fatty liver disease based upon ultrasound.       The most recent laboratory studies indicate the liver transaminases are elevated, alkaline phosphatase is elevated, tests of hepatic synthetic and metabolic function are normal, total bilirubin is normal, indirect bilirubin is normal, INR is normal, albumin is normal and the platelet count is normal.      Serologic evaluation for markers of chronic liver disease was negative for hep B, ferritin, iron, ceruloplasmin and alpha-1 in 2018. She does not think she has ever been testing for HCV. The most recent imaging of the liver was an ultrasound performed in 12/2019. Results suggest fatty liver disease. An assessment of liver fibrosis with biopsy or elastography has not been performed. The patient had not started any new medications within 3 months preceding the elevation in liver chemistries. She has been on her statin for over a year. The patient has no symptoms which can be attributed to the liver disorder. The patient is not currently experiencing the following symptoms of liver disease: pain in the right side over the liver or yellowing of the eyes or skin. The patient completes all daily activities without any functional limitations. ASSESSMENT AND PLAN:  Fatty liver  Suspect the patient has fatty liver based upon imaging. Liver transaminases are elevated. ALP is elevated. Liver function is normal. Total bilirubin is normal. Serum albumin is normal. The platelet count is normal.     Based upon laboratory studies and imaging, the patient does not appear to have advanced liver disease. Will perform laboratory testing to monitor liver function and degree of liver injury. This included BMP, hepatic panel, CBC with platelet count and INR. Serologic testing for causes of chronic liver disease was ordered. The need to perform an assessment of liver fibrosis was discussed with the patient. The FibroScan can assess liver fibrosis and determine if a patient has advanced fibrosis or cirrhosis without the need for liver biopsy. This will be performed at the next office visit.   If the FibroScan suggests advanced fibrosis, then a liver biopsy should be considered. If the liver enzymes remain persistently elevated over the next 1-2 years, a liver biopsy should be performed to ensure there is no ongoing chronic liver disease. If the patient loses 20% of current body weight, all steatosis will resolve. Once all steatosis has resolved, all inflammation will resolve. Then all fibrosis will gradually resolve and the liver could eventually be normal.    There is currently no FDA approved medical treatment for fatty liver, NALFD or SHELTON. The only medical treatments for SHELTON are though clinical trials. The patient would like to lose weight. Counseling for diet and weight loss in patients with confirmed or suspected NAFLD  The patient was counseled regarding diet and exercise to achieve weight loss. The best diet for patients with fatty liver is one very low in carbohydrates and enriched with protein such as an Meño's program. This was discussed in detail with the patient and a handout was mailed to the patient. She has already lost 10 pounds since her PCP asked her to work on weight loss. This is fantastic and I have encouraged her to continue. She will be visiting her daughter in [place] for 3 weeks soon. Her children are very involved in her care and health. The patient was told not to consume any food products and drinks containing fructose as this enhances hepatic fat synthesis. There is no medication or vitamin supplements we advocate for SHELTON. Using glitazones in patients without diabetes mellitus has been shown to reduce fat content in the liver but has no effect on fibrosis and is associated with weight gain. Vitamin E has also been used but the data is not very good and most experts no longer advocate this. Screening for hepatocellular carcinoma  HCC screening is not necessary if the patient has no evidence of cirrhosis.     Treatment of other medical problems in patients with chronic liver disease  There are no contraindications for the patient to take most medications necessary for treatment of other medical issues. The patient can take any medications utilized for treatment of DM and/or statins to treat hypercholesterolemia. I have told her she can resume her statin. The patient does not consume alcohol on a daily basis. Normal doses of acetaminophen, as recommended on the label of the bottle, are not hepatotoxic except in the setting of daily alcohol use. Even patients with cirrhosis can utilize acetaminophen for pain. Because she does not have cirrhosis, NSAIDs are fine to use - she states she has good pain control with Meloxicam and this is fine to resume. Counseling for alcohol in patients with chronic liver disease  The patient was counseled regarding alcohol consumption and the effect of alcohol on chronic liver disease. The patient does not consume any significant amount of alcohol. Vaccinations   Vaccination for viral hepatitis B is recommended since the patient has no serologic evidence of previous exposure or vaccination with immunity. The need for vaccination against viral hepatitis A will be assessed with serologic and instituted as appropriate. Routine vaccinations against other bacterial and viral agents can be performed as indicated. Annual flu vaccination should be administered if indicated. Allergies   Allergen Reactions    Pcn [Penicillins] Hives    Lisinopril Cough     Dry cough    Nsaids (Non-Steroidal Anti-Inflammatory Drug) Other (comments)     Abdominal pain     Current Outpatient Medications on File Prior to Visit   Medication Sig Dispense Refill    omeprazole (PRILOSEC) 10 mg capsule TAKE 1 CAPSULE BY MOUTH EVERY DAY 90 Cap 1    losartan (COZAAR) 100 mg tablet Take 1 Tab by mouth daily. 90 Tab 1    metFORMIN ER (GLUCOPHAGE XR) 500 mg tablet Take 1 Tab by mouth two (2) times daily (with meals).  TAKE 1 TABLET BY MOUTH EVERY  Tab 1    albuterol (PROVENTIL HFA, VENTOLIN HFA, PROAIR HFA) 90 mcg/actuation inhaler Take 2 Puffs by inhalation every six (6) hours as needed for Wheezing. 1 Inhaler 0    cholecalciferol, vitamin D3, (VITAMIN D3) 2,000 unit Tab Take 5,000 Int'l Units by mouth daily.  atorvastatin (LIPITOR) 40 mg tablet Take 1 Tab by mouth daily. 90 Tab 3    meloxicam (MOBIC) 15 mg tablet TAKE 1 TABLET BY MOUTH EVERY DAY AS NEEDED 90 Tab 1     No current facility-administered medications on file prior to visit. SYSTEM REVIEW NOT RELATED TO LIVER DISEASE OR REVIEWED ABOVE:  Constitution systems: Negative for fever, chills, weight gain, weight loss. Eyes: Negative for visual changes. ENT: Negative for sore throat, painful swallowing. Respiratory: Negative for cough, hemoptysis, SOB. Cardiology: Negative for chest pain, palpitations. GI:  Negative for constipation or diarrhea. : Negative for urinary frequency, dysuria, hematuria, nocturia. Skin: Negative for rash. Hematology: Negative for easy bruising, blood clots. Musculo-skeletal: Negative for back pain, muscle pain, weakness. Neurologic: Negative for headaches, dizziness, vertigo, memory problems not related to HE. Psychology: Negative for anxiety, depression. FAMILY HISTORY:  The father  from AMI. The mother  from AMI. There is no family history of liver disease. There is no family history of immune disorders. SOCIAL HISTORY:  The patient is . The patient has 4 children (son lives with her) and 1 grandchild (girl - 12 months). The patient has never used tobacco products. The patient has never consumed alcohol. The patient does not work outside the home. She immigrated from [place] when she was 21years old. Visit Vitals  /76 (BP 1 Location: Left arm, BP Patient Position: Sitting)   Pulse 78   Temp 98.6 °F (37 °C) (Tympanic)   Ht 5' 5\" (1.651 m)   Wt 175 lb (79.4 kg)   SpO2 97%   BMI 29.12 kg/m²     PHYSICAL EXAMINATION:  General: No acute distress. Overweight. Eyes: Sclera anicteric. ENT: No oral lesions. Nodes: No adenopathy. Skin: No spider angiomata. No jaundice. No palmar erythema. Respiratory: Lungs clear to auscultation. Cardiovascular: Regular heart rate. No murmurs. No JVD. Abdomen: Soft non-tender, liver size normal to percussion/palpation. Spleen not palpable. No obvious ascites. Extremities: No edema. No muscle wasting. No gross arthritic changes. Neurologic: Alert and oriented. Cranial nerves grossly intact. No asterixis.     LABORATORY STUDIES:  [place]iver [place] of [address] Units 11/5/2019 8/26/2019   WBC 3.4 - 10.8 x10E3/uL  8.1   ANC 1.4 - 7.0 x10E3/uL  4.6   HGB 11.1 - 15.9 g/dL  14.1    - 450 x10E3/uL  245   INR 0.8 - 1.2     AST 0 - 40 IU/L 96 (H) 59 (H)   ALT 0 - 32 IU/L 175 (H) 115 (H)   Alk Phos 39 - 117 IU/L 124 (H) 144 (H)   Bili, Total 0.0 - 1.2 mg/dL 0.3 0.5   Bili, Direct 0.00 - 0.40 mg/dL 0.10    Albumin 3.5 - 5.5 g/dL 4.6 4.9   BUN 6 - 24 mg/dL  15   Creat 0.57 - 1.00 mg/dL  0.74   Na 134 - 144 mmol/L  139   K 3.5 - 5.2 mmol/L  4.8   Cl 96 - 106 mmol/L  98   CO2 20 - 29 mmol/L  24   Glucose 65 - 99 mg/dL  150 (H)     Liver [place] of [address] [place] [address] Ref Rng & Units 2/28/2019   WBC 3.4 - 10.8 x10E3/uL 9.3   ANC 1.4 - 7.0 x10E3/uL 5.7   HGB 11.1 - 15.9 g/dL 14.1    - 450 x10E3/uL 278   INR 0.8 - 1.2    AST 0 - 40 IU/L 28   ALT 0 - 32 IU/L 53 (H)   Alk Phos 39 - 117 IU/L 125 (H)   Bili, Total 0.0 - 1.2 mg/dL 0.4   Bili, Direct 0.00 - 0.40 mg/dL    Albumin 3.5 - 5.5 g/dL 4.6   BUN 6 - 24 mg/dL 17   Creat 0.57 - 1.00 mg/dL 0.70   Na 134 - 144 mmol/L 144   K 3.5 - 5.2 mmol/L 5.0   Cl 96 - 106 mmol/L 102   CO2 20 - 29 mmol/L 26   Glucose 65 - 99 mg/dL 140 (H)     SEROLOGIES:  Serologies Latest Ref Rng & Units 2/7/2018   Hep B Surface Ag Negative Negative   Hep B Core Ab, Total Negative Negative   Hep B Surface AB QL  Non Reactive   Ferritin 15 - 150 ng/mL 183 (H)   Iron % Saturation 15 - 55 % 19 Ceruloplasmin 19.0 - 39.0 mg/dL 26.1   Alpha-1 antitrypsin level 90 - 200 mg/dL 113     LIVER HISTOLOGY:  Not available or performed    ENDOSCOPIC PROCEDURES:  Not available or performed    RADIOLOGY:  11/2019. RUQ ultrasound. Increased echogenicity and hepatomegaly. Gallbladder sludge. OTHER TESTING:  Not available or performed    FOLLOW-UP:  All of the issues listed above in the assessment and plan were discussed with the patient. All questions were answered. The patient expressed a clear understanding of the above. [address] [place] [address] in 2-4 weeks for FibroScan, to review all data and determine the treatment plan.     Samy Espinoza, [facility]  Liver [place] [facility] [address] [place] [address], [address] [address] Jenkins  22. [phone]

## 2019-12-17 LAB
ACTIN IGG SERPL-ACNC: 5 UNITS (ref 0–19)
ANA SER QL: NEGATIVE
CERULOPLASMIN SERPL-MCNC: 27 MG/DL (ref 19–39)
FERRITIN SERPL-MCNC: 325 NG/ML (ref 15–150)
HAV AB SER QL IA: POSITIVE
IRON SATN MFR SERPL: 24 % (ref 15–55)
IRON SERPL-MCNC: 76 UG/DL (ref 27–159)
MITOCHONDRIA M2 IGG SER-ACNC: <20 UNITS (ref 0–20)
TIBC SERPL-MCNC: 319 UG/DL (ref 250–450)
UIBC SERPL-MCNC: 243 UG/DL (ref 131–425)

## 2019-12-18 LAB
ACE SERPL-CCNC: 24 U/L (ref 14–82)
HCV RNA SERPL QL NAA+PROBE: NEGATIVE

## 2019-12-24 LAB
LAB DIRECTOR NAME PROVIDER: NORMAL
SERPINA1 GENE MUT ANL BLD/T: NORMAL
SERPINA1 GENE MUT TESTED BLD/T: NORMAL

## 2020-01-24 DIAGNOSIS — I10 ESSENTIAL HYPERTENSION: ICD-10-CM

## 2020-01-24 DIAGNOSIS — E78.1 HIGH TRIGLYCERIDES: ICD-10-CM

## 2020-01-24 DIAGNOSIS — E78.5 DYSLIPIDEMIA: ICD-10-CM

## 2020-01-24 DIAGNOSIS — R79.89 ELEVATED LFTS: ICD-10-CM

## 2020-01-24 DIAGNOSIS — E11.65 UNCONTROLLED TYPE 2 DIABETES MELLITUS WITH HYPERGLYCEMIA (HCC): ICD-10-CM

## 2020-01-30 ENCOUNTER — OFFICE VISIT (OUTPATIENT)
Dept: HEMATOLOGY | Age: 60
End: 2020-01-30

## 2020-01-30 VITALS
SYSTOLIC BLOOD PRESSURE: 145 MMHG | HEIGHT: 65 IN | OXYGEN SATURATION: 96 % | BODY MASS INDEX: 29.32 KG/M2 | WEIGHT: 176 LBS | TEMPERATURE: 98.7 F | DIASTOLIC BLOOD PRESSURE: 75 MMHG | HEART RATE: 76 BPM

## 2020-01-30 DIAGNOSIS — K76.0 FATTY LIVER: Primary | ICD-10-CM

## 2020-01-30 NOTE — PROGRESS NOTES
3340 [facility], MD, MD Ajay Bernabe PA-C Jannette Pod, [facility]     Krystin Frankel, [facility]   RINA Juarez [facility]       Aga Still Count includes the [facility] 136    at [address]    217 [facility], [address] [place] [address] Baldemar Gooden  22.    [phone]    FAX: [address] [place] [address], [address]    [phone]    FAX: [phone]     Patient Care Team:  Lou Stephens as PCP - General (Physician Assistant)  40 [facility], Letitia Mendez PA-C as PCP - [facility]  Rosalind Sim MD as Physician (Orthopedic Surgery)  [place], [place] (Optometry)     Patient Active Problem List   Diagnosis Code    Vitamin D deficiency E55.9    Heel spur M77.30    Iron deficiency anemia D50.9    Fibrocystic breast changes N60.19    Other and unspecified hyperlipidemia E78.5    Back pain M54.9    Leg pain, left M79.605    Hyperglycemia R73.9    Hypertension I10    Type 2 diabetes mellitus without complication, without long-term current use of insulin (HCC) E11.9    High triglycerides E78.1    Fatty liver K76.0    Positive FIT (fecal immunochemical test) R19.5    Elevated LFTs R94.5     [facility] for management of elevated liver enzymes. The active problem list, all pertinent past medical history, medications, radiologic findings and laboratory findings related to the liver disorder were reviewed with the patient. The patient is a 61 y.o.  female who is suspected to have fatty liver disease based upon ultrasound.       The most recent laboratory studies indicate the liver transaminases are elevated, alkaline phosphatase is elevated, tests of hepatic synthetic and metabolic function are normal, total bilirubin is normal, indirect bilirubin is normal, INR is normal, albumin is normal and the platelet count is normal.      Serologic evaluation for markers of chronic liver disease was negative. The most recent imaging of the liver was an ultrasound performed in 12/2019. Results suggest fatty liver disease. An assessment of liver fibrosis with biopsy or elastography has not been performed. The FibroScan is not a covered service with her insurance. The patient has no symptoms which can be attributed to the liver disorder. The patient is not currently experiencing the following symptoms of liver disease: pain in the right side over the liver or yellowing of the eyes or skin. The patient completes all daily activities without any functional limitations. ASSESSMENT AND PLAN:  Fatty liver  Suspect the patient has fatty liver based upon imaging. Liver transaminases are elevated. ALP is elevated. Liver function is normal. Total bilirubin is normal. Serum albumin is normal. The platelet count is normal.     Based upon laboratory studies and imaging, the patient does not appear to have advanced liver disease. Will perform laboratory testing to monitor liver function and degree of liver injury. This included BMP, hepatic panel, CBC with platelet count and INR. Serologic testing for causes of chronic liver disease was negative. The need to perform an assessment of liver fibrosis was discussed with the patient. The FibroScan is not covered, so I will order a FibroSure. If the liver enzymes remain persistently elevated over the next 1-2 years, despite weight loss, a liver biopsy should be performed to ensure there is no ongoing chronic liver disease. If the patient loses 20% of current body weight, all steatosis will resolve. Once all steatosis has resolved, all inflammation will resolve.  Then all fibrosis will gradually resolve and the liver could eventually be normal.    There is currently no FDA approved medical treatment for fatty liver, NALFD or SHELTON. The only medical treatments for SHELTON are though clinical trials. The patient would like to lose weight. Counseling for diet and weight loss in patients with confirmed or suspected NAFLD  The patient was counseled regarding diet and exercise to achieve weight loss. The best diet for patients with fatty liver is one very low in carbohydrates and enriched with protein such as an Meño's program.     She had a nice visit with her daughter for 3 weeks in [place]. She lost 3 pounds as she was exercising and eating better because her daughter is also working on losing weight. Screening for hepatocellular carcinoma  HCC screening is not necessary if the patient has no evidence of cirrhosis. Treatment of other medical problems in patients with chronic liver disease  There are no contraindications for the patient to take most medications necessary for treatment of other medical issues. The patient can take any medications utilized for treatment of DM and/or statins to treat hypercholesterolemia. I have told her she can resume her statin. The patient does not consume alcohol on a daily basis. Normal doses of acetaminophen, as recommended on the label of the bottle, are not hepatotoxic except in the setting of daily alcohol use. Even patients with cirrhosis can utilize acetaminophen for pain. Because she does not have cirrhosis, NSAIDs are fine to use - she states she has good pain control with Meloxicam and this is fine to resume. Counseling for alcohol in patients with chronic liver disease  The patient was counseled regarding alcohol consumption and the effect of alcohol on chronic liver disease. The patient does not consume any significant amount of alcohol.     Vaccinations   Vaccination for viral hepatitis B is recommended since the patient has no serologic evidence of previous exposure or vaccination with immunity. Vaccination against viral hepatitis A is not needed. Routine vaccinations against other bacterial and viral agents can be performed as indicated. Annual flu vaccination should be administered if indicated. Allergies   Allergen Reactions    Pcn [Penicillins] Hives    Lisinopril Cough     Dry cough    Nsaids (Non-Steroidal Anti-Inflammatory Drug) Other (comments)     Abdominal pain     Current Outpatient Medications on File Prior to Visit   Medication Sig Dispense Refill    omeprazole (PRILOSEC) 10 mg capsule TAKE 1 CAPSULE BY MOUTH EVERY DAY 90 Cap 1    losartan (COZAAR) 100 mg tablet Take 1 Tab by mouth daily. 90 Tab 1    metFORMIN ER (GLUCOPHAGE XR) 500 mg tablet Take 1 Tab by mouth two (2) times daily (with meals). TAKE 1 TABLET BY MOUTH EVERY  Tab 1    atorvastatin (LIPITOR) 40 mg tablet Take 1 Tab by mouth daily. 90 Tab 3    meloxicam (MOBIC) 15 mg tablet TAKE 1 TABLET BY MOUTH EVERY DAY AS NEEDED 90 Tab 1    albuterol (PROVENTIL HFA, VENTOLIN HFA, PROAIR HFA) 90 mcg/actuation inhaler Take 2 Puffs by inhalation every six (6) hours as needed for Wheezing. 1 Inhaler 0    cholecalciferol, vitamin D3, (VITAMIN D3) 2,000 unit Tab Take 5,000 Int'l Units by mouth daily. No current facility-administered medications on file prior to visit. SYSTEM REVIEW NOT RELATED TO LIVER DISEASE OR REVIEWED ABOVE:  Constitution systems: Negative for fever, chills, weight gain, weight loss. Eyes: Negative for visual changes. ENT: Negative for sore throat, painful swallowing. Respiratory: Negative for cough, hemoptysis, SOB. Cardiology: Negative for chest pain, palpitations. GI:  Negative for constipation or diarrhea. : Negative for urinary frequency, dysuria, hematuria, nocturia. Skin: Negative for rash. Hematology: Negative for easy bruising, blood clots. Musculo-skeletal: Negative for back pain, muscle pain, weakness.   Neurologic: Negative for headaches, dizziness, vertigo, memory problems not related to HE. Psychology: Negative for anxiety, depression. FAMILY HISTORY:  The father  from AMI. The mother  from AMI. There is no family history of liver disease. There is no family history of immune disorders. SOCIAL HISTORY:  The patient is . The patient has 4 children (son lives with her) and 1 grandchild (girl - 12 months). The patient has never used tobacco products. The patient has never consumed alcohol. The patient does not work outside the home. She immigrated from [place] when she was 21years old. Visit Vitals  /75 (BP 1 Location: Right arm, BP Patient Position: Sitting)   Pulse 76   Temp 98.7 °F (37.1 °C) (Tympanic)   Ht 5' 5\" (1.651 m)   Wt 176 lb (79.8 kg)   SpO2 96%   BMI 29.29 kg/m²     PHYSICAL EXAMINATION:  General: No acute distress. Overweight. Eyes: Sclera anicteric. ENT: No oral lesions. Nodes: No adenopathy. Skin: No spider angiomata. No jaundice. No palmar erythema. Respiratory: Lungs clear to auscultation. Cardiovascular: Regular heart rate. No murmurs. No JVD. Abdomen: Soft non-tender, liver size normal to percussion/palpation. Spleen not palpable. No obvious ascites. Extremities: No edema. No muscle wasting. No gross arthritic changes. Neurologic: Alert and oriented. Cranial nerves grossly intact. No asterixis.     LABORATORY STUDIES:  [facility] of [address] [place] [address] 2019   WBC 3.4 - 10.8 x10E3/uL  8.1   ANC 1.4 - 7.0 x10E3/uL  4.6   HGB 11.1 - 15.9 g/dL  14.1    - 450 x10E3/uL  245   INR 0.8 - 1.2     AST 0 - 40 IU/L 96 (H) 59 (H)   ALT 0 - 32 IU/L 175 (H) 115 (H)   Alk Phos 39 - 117 IU/L 124 (H) 144 (H)   Bili, Total 0.0 - 1.2 mg/dL 0.3 0.5   Bili, Direct 0.00 - 0.40 mg/dL 0.10    Albumin 3.5 - 5.5 g/dL 4.6 4.9   BUN 6 - 24 mg/dL  15   Creat 0.57 - 1.00 mg/dL  0.74   Na 134 - 144 mmol/L  139   K 3.5 - 5.2 mmol/L  4.8   Cl 96 - 106 mmol/L  98   CO2 20 - 29 mmol/L  24   Glucose 65 - 99 mg/dL  150 (H)     Liver [place] of [address] [place] [address] Ref Rng & Units 2/28/2019   WBC 3.4 - 10.8 x10E3/uL 9.3   ANC 1.4 - 7.0 x10E3/uL 5.7   HGB 11.1 - 15.9 g/dL 14.1    - 450 x10E3/uL 278   INR 0.8 - 1.2    AST 0 - 40 IU/L 28   ALT 0 - 32 IU/L 53 (H)   Alk Phos 39 - 117 IU/L 125 (H)   Bili, Total 0.0 - 1.2 mg/dL 0.4   Bili, Direct 0.00 - 0.40 mg/dL    Albumin 3.5 - 5.5 g/dL 4.6   BUN 6 - 24 mg/dL 17   Creat 0.57 - 1.00 mg/dL 0.70   Na 134 - 144 mmol/L 144   K 3.5 - 5.2 mmol/L 5.0   Cl 96 - 106 mmol/L 102   CO2 20 - 29 mmol/L 26   Glucose 65 - 99 mg/dL 140 (H)     SEROLOGIES:  Serologies Latest Ref Rng & Units 12/16/2019 2/7/2018   Hep A Ab, Total Negative Positive (A)    Hep B Surface Ag Negative  Negative   Hep B Core Ab, Total Negative  Negative   Hep B Surface AB QL   Non Reactive   Ferritin 15 - 150 ng/mL 325 (H) 183 (H)   Iron % Saturation 15 - 55 % 24 19   DAVIAN Ab, Direct Negative Negative    ASMCA 0 - 19 Units 5    M2 Ab 0.0 - 20.0 Units <20.0    Ceruloplasmin 19.0 - 39.0 mg/dL 27.0 26.1   Alpha-1 antitrypsin level 90 - 200 mg/dL  113     LIVER HISTOLOGY:  Not available or performed    ENDOSCOPIC PROCEDURES:  Not available or performed    RADIOLOGY:  11/2019. RUQ ultrasound. Increased echogenicity and hepatomegaly. Gallbladder sludge. OTHER TESTING:  Not available or performed    FOLLOW-UP:  All of the issues listed above in the assessment and plan were discussed with the patient. All questions were answered. The patient expressed a clear understanding of the above. [facility] 87 in 4 weeks to assess weight loss.      Buffy Larson, [facility]-BC  Liver [place] [facility] [address] [facility], [address] Baldemar Jenkins  22.  [phone]

## 2020-01-30 NOTE — PROGRESS NOTES
Calvin Hallman is a 61 y.o. female  Chief Complaint   Patient presents with    Follow-up     Visit Vitals  /75 (BP 1 Location: Right arm, BP Patient Position: Sitting)   Pulse 76   Temp 98.7 °F (37.1 °C) (Tympanic)   Ht 5' 5\" (1.651 m)   Wt 176 lb (79.8 kg)   SpO2 96%   BMI 29.29 kg/m²         Visit Vitals  /75 (BP 1 Location: Left arm, BP Patient Position: Sitting)   Pulse 76   Temp 98.7 °F (37.1 °C) (Tympanic)   Ht 5' 5\" (1.651 m)   Wt 176 lb (79.8 kg)   SpO2 96%   BMI 29.29 kg/m²           3 most recent PHQ Screens 1/30/2020   Little interest or pleasure in doing things Not at all   Feeling down, depressed, irritable, or hopeless Not at all   Total Score PHQ 2 0     Learning Assessment 1/30/2020   PRIMARY LEARNER Patient   HIGHEST LEVEL OF EDUCATION - PRIMARY LEARNER  -   BARRIERS PRIMARY LEARNER NONE   CO-LEARNER CAREGIVER No   PRIMARY LANGUAGE ENGLISH   LEARNER PREFERENCE PRIMARY DEMONSTRATION     -   ANSWERED BY patient   RELATIONSHIP SELF     Abuse Screening Questionnaire 1/30/2020   Do you ever feel afraid of your partner? N   Are you in a relationship with someone who physically or mentally threatens you? N   Is it safe for you to go home? Y         1. Have you been to the ER, urgent care clinic since your last visit? Hospitalized since your last visit? No    2. Have you seen or consulted any other health care providers outside of the [address] [place] [address] since your last visit? Include any pap smears or colon screening.  No

## 2020-02-03 DIAGNOSIS — E78.5 DYSLIPIDEMIA: ICD-10-CM

## 2020-02-03 LAB
ERYTHROCYTE [DISTWIDTH] IN BLOOD BY AUTOMATED COUNT: 12.9 % (ref 11.7–15.4)
HCT VFR BLD AUTO: 42.1 % (ref 34–46.6)
HGB BLD-MCNC: 13.8 G/DL (ref 11.1–15.9)
MCH RBC QN AUTO: 29.9 PG (ref 26.6–33)
MCHC RBC AUTO-ENTMCNC: 32.8 G/DL (ref 31.5–35.7)
MCV RBC AUTO: 91 FL (ref 79–97)
PLATELET # BLD AUTO: 253 X10E3/UL (ref 150–450)
RBC # BLD AUTO: 4.61 X10E6/UL (ref 3.77–5.28)
WBC # BLD AUTO: 7.6 X10E3/UL (ref 3.4–10.8)

## 2020-02-03 NOTE — TELEPHONE ENCOUNTER
Requested Prescriptions     Pending Prescriptions Disp Refills    atorvastatin (LIPITOR) 40 mg tablet 90 Tab 3     Sig: Take 1 Tab by mouth daily.

## 2020-02-04 DIAGNOSIS — R79.89 ELEVATED LFTS: ICD-10-CM

## 2020-02-04 DIAGNOSIS — E78.5 DYSLIPIDEMIA: Primary | ICD-10-CM

## 2020-02-04 LAB
ALBUMIN SERPL-MCNC: 4.5 G/DL (ref 3.8–4.9)
ALBUMIN SERPL-MCNC: 4.8 G/DL (ref 3.8–4.9)
ALBUMIN/GLOB SERPL: 1.9 {RATIO} (ref 1.2–2.2)
ALP SERPL-CCNC: 115 IU/L (ref 39–117)
ALP SERPL-CCNC: 117 IU/L (ref 39–117)
ALT SERPL-CCNC: 147 IU/L (ref 0–32)
ALT SERPL-CCNC: 148 IU/L (ref 0–32)
AST SERPL-CCNC: 72 IU/L (ref 0–40)
AST SERPL-CCNC: 73 IU/L (ref 0–40)
BASOPHILS # BLD AUTO: 0.1 X10E3/UL (ref 0–0.2)
BASOPHILS NFR BLD AUTO: 1 %
BILIRUB DIRECT SERPL-MCNC: 0.11 MG/DL (ref 0–0.4)
BILIRUB SERPL-MCNC: 0.4 MG/DL (ref 0–1.2)
BILIRUB SERPL-MCNC: 0.4 MG/DL (ref 0–1.2)
BUN SERPL-MCNC: 14 MG/DL (ref 6–24)
BUN SERPL-MCNC: 15 MG/DL (ref 6–24)
BUN/CREAT SERPL: 20 (ref 9–23)
BUN/CREAT SERPL: 21 (ref 9–23)
CALCIUM SERPL-MCNC: 9.9 MG/DL (ref 8.7–10.2)
CALCIUM SERPL-MCNC: 9.9 MG/DL (ref 8.7–10.2)
CHLORIDE SERPL-SCNC: 102 MMOL/L (ref 96–106)
CHLORIDE SERPL-SCNC: 99 MMOL/L (ref 96–106)
CHOLEST SERPL-MCNC: 282 MG/DL (ref 100–199)
CO2 SERPL-SCNC: 21 MMOL/L (ref 20–29)
CO2 SERPL-SCNC: 24 MMOL/L (ref 20–29)
CREAT SERPL-MCNC: 0.7 MG/DL (ref 0.57–1)
CREAT SERPL-MCNC: 0.73 MG/DL (ref 0.57–1)
EOSINOPHIL # BLD AUTO: 0.2 X10E3/UL (ref 0–0.4)
EOSINOPHIL NFR BLD AUTO: 2 %
ERYTHROCYTE [DISTWIDTH] IN BLOOD BY AUTOMATED COUNT: 12.7 % (ref 11.7–15.4)
EST. AVERAGE GLUCOSE BLD GHB EST-MCNC: 140 MG/DL
GLOBULIN SER CALC-MCNC: 2.4 G/DL (ref 1.5–4.5)
GLUCOSE SERPL-MCNC: 122 MG/DL (ref 65–99)
GLUCOSE SERPL-MCNC: 130 MG/DL (ref 65–99)
HBA1C MFR BLD: 6.5 % (ref 4.8–5.6)
HCT VFR BLD AUTO: 39.8 % (ref 34–46.6)
HDLC SERPL-MCNC: 37 MG/DL
HGB BLD-MCNC: 13.9 G/DL (ref 11.1–15.9)
IMM GRANULOCYTES # BLD AUTO: 0 X10E3/UL (ref 0–0.1)
IMM GRANULOCYTES NFR BLD AUTO: 0 %
INR PPP: 1 (ref 0.8–1.2)
INTERPRETATION, 910389: NORMAL
LDLC SERPL CALC-MCNC: ABNORMAL MG/DL (ref 0–99)
LYMPHOCYTES # BLD AUTO: 2.8 X10E3/UL (ref 0.7–3.1)
LYMPHOCYTES NFR BLD AUTO: 37 %
Lab: NORMAL
MCH RBC QN AUTO: 30.8 PG (ref 26.6–33)
MCHC RBC AUTO-ENTMCNC: 34.9 G/DL (ref 31.5–35.7)
MCV RBC AUTO: 88 FL (ref 79–97)
MONOCYTES # BLD AUTO: 0.5 X10E3/UL (ref 0.1–0.9)
MONOCYTES NFR BLD AUTO: 7 %
NEUTROPHILS # BLD AUTO: 4 X10E3/UL (ref 1.4–7)
NEUTROPHILS NFR BLD AUTO: 53 %
PLATELET # BLD AUTO: 258 X10E3/UL (ref 150–450)
POTASSIUM SERPL-SCNC: 4.5 MMOL/L (ref 3.5–5.2)
POTASSIUM SERPL-SCNC: 4.5 MMOL/L (ref 3.5–5.2)
PROT SERPL-MCNC: 6.9 G/DL (ref 6–8.5)
PROT SERPL-MCNC: 6.9 G/DL (ref 6–8.5)
PROTHROMBIN TIME: 10.4 SEC (ref 9.1–12)
RBC # BLD AUTO: 4.51 X10E6/UL (ref 3.77–5.28)
SODIUM SERPL-SCNC: 142 MMOL/L (ref 134–144)
SODIUM SERPL-SCNC: 142 MMOL/L (ref 134–144)
TRIGL SERPL-MCNC: 423 MG/DL (ref 0–149)
VLDLC SERPL CALC-MCNC: ABNORMAL MG/DL (ref 5–40)
WBC # BLD AUTO: 7.6 X10E3/UL (ref 3.4–10.8)

## 2020-02-04 RX ORDER — ATORVASTATIN CALCIUM 40 MG/1
40 TABLET, FILM COATED ORAL DAILY
Qty: 90 TAB | Refills: 3 | Status: SHIPPED | OUTPATIENT
Start: 2020-02-04 | End: 2021-01-29

## 2020-02-05 LAB
A2 MACROGLOB SERPL-MCNC: 163 MG/DL (ref 110–276)
ALT SERPL W P-5'-P-CCNC: 165 IU/L (ref 0–40)
APO A-I SERPL-MCNC: 121 MG/DL (ref 116–209)
AST SERPL W P-5'-P-CCNC: 87 IU/L (ref 0–40)
BILIRUB SERPL-MCNC: 0.2 MG/DL (ref 0–1.2)
CHOLEST SERPL-MCNC: 298 MG/DL (ref 100–199)
COMMENT:: ABNORMAL
FIBROSIS SCORING:, 550107: ABNORMAL
FIBROSIS STAGE SERPL QL: ABNORMAL
GGT SERPL-CCNC: 87 IU/L (ref 0–60)
GLUCOSE SERPL-MCNC: 134 MG/DL (ref 65–99)
HAPTOGLOB SERPL-MCNC: 132 MG/DL (ref 33–346)
INTERPRETATIONS:, 550143: ABNORMAL
LIVER FIBR SCORE SERPL CALC.FIBROSURE: 0.17 (ref 0–0.21)
NASH SCORING, 550144: ABNORMAL
NECROINFLAMMATORY ACT GRADE SERPL QL: ABNORMAL
NECROINFLAMMATORY ACT SCORE SERPL: 0.5
SERVICE CMNT-IMP: ABNORMAL
STEATOSIS GRADE, 550153: ABNORMAL
STEATOSIS GRADING, 550189: ABNORMAL
STEATOSIS SCORE, 550149: 0.96 (ref 0–0.3)
TRIGL SERPL-MCNC: 455 MG/DL (ref 0–149)

## 2020-02-10 DIAGNOSIS — I10 ESSENTIAL HYPERTENSION: ICD-10-CM

## 2020-02-10 RX ORDER — LOSARTAN POTASSIUM 100 MG/1
TABLET ORAL
Qty: 30 TAB | Refills: 5 | Status: SHIPPED | OUTPATIENT
Start: 2020-02-10 | End: 2020-02-12 | Stop reason: ALTCHOICE

## 2020-02-11 NOTE — PROGRESS NOTES
To be reviewed with patient at upcoming office visit on 2/12/2020. A1C imprvoed to 6.5% from 8.0%!! CHL and TG up while off statin. Although liver enzymes remain elevated, ok per hepatology to resume statin and monitor.

## 2020-02-12 ENCOUNTER — OFFICE VISIT (OUTPATIENT)
Dept: FAMILY MEDICINE CLINIC | Age: 60
End: 2020-02-12

## 2020-02-12 VITALS
SYSTOLIC BLOOD PRESSURE: 138 MMHG | RESPIRATION RATE: 18 BRPM | WEIGHT: 174 LBS | TEMPERATURE: 96.6 F | OXYGEN SATURATION: 96 % | DIASTOLIC BLOOD PRESSURE: 80 MMHG | HEIGHT: 65 IN | BODY MASS INDEX: 28.99 KG/M2 | HEART RATE: 75 BPM

## 2020-02-12 DIAGNOSIS — E11.9 CONTROLLED TYPE 2 DIABETES MELLITUS WITHOUT COMPLICATION, WITHOUT LONG-TERM CURRENT USE OF INSULIN (HCC): Primary | ICD-10-CM

## 2020-02-12 DIAGNOSIS — E78.5 DYSLIPIDEMIA: ICD-10-CM

## 2020-02-12 DIAGNOSIS — I10 ESSENTIAL HYPERTENSION: ICD-10-CM

## 2020-02-12 DIAGNOSIS — K76.0 FATTY LIVER: ICD-10-CM

## 2020-02-12 RX ORDER — METFORMIN HYDROCHLORIDE 500 MG/1
1000 TABLET, EXTENDED RELEASE ORAL 2 TIMES DAILY WITH MEALS
Qty: 360 TAB | Refills: 1 | Status: SHIPPED | OUTPATIENT
Start: 2020-02-12 | End: 2020-07-14

## 2020-02-12 RX ORDER — NAPROXEN 500 MG/1
TABLET ORAL
COMMUNITY
Start: 2020-01-03 | End: 2020-09-28 | Stop reason: SDUPTHER

## 2020-02-12 RX ORDER — LOSARTAN POTASSIUM AND HYDROCHLOROTHIAZIDE 12.5; 1 MG/1; MG/1
1 TABLET ORAL DAILY
Qty: 90 TAB | Refills: 1 | Status: SHIPPED | OUTPATIENT
Start: 2020-02-12 | End: 2020-08-10

## 2020-02-12 NOTE — PROGRESS NOTES
Aditya Cole is a 61 y.o. female  Chief Complaint   Patient presents with    Follow-up     to go over labs     Health Maintenance Due   Topic Date Due    Eye Exam Retinal or Dilated  05/28/1970    Shingrix Vaccine Age 50> (1 of 2) 05/28/2010    FOBT Q1Y Age 50-75  02/14/2019    DTaP/Tdap/Td series (2 - Td) 04/15/2019    Foot Exam Q1  02/27/2020    MICROALBUMIN Q1  02/28/2020     Visit Vitals  /67   Pulse 75   Temp 96.6 °F (35.9 °C) (Oral)   Resp 18   Ht 5' 5\" (1.651 m)   Wt 174 lb (78.9 kg)   SpO2 96%   BMI 28.96 kg/m²     1. Have you been to the ER, urgent care clinic since your last visit? Hospitalized since your last visit? No    2. Have you seen or consulted any other health care providers outside of the [address] [place] [address] since your last visit? Include any pap smears or colon screening.  No

## 2020-02-12 NOTE — PATIENT INSTRUCTIONS
Schedule a lab visit for 6 weeks to check 2 labs tests, your liver tests and your electrolytes. You do NOT need to be fasting. [facility]. Potassium-Rich Diet: Care Instructions  Your Care Instructions    Potassium is a mineral. It helps keep the right mix of fluids in your body. It also helps your nerves and muscles work as they should. You'll find it in milk and meats. It's also in all fresh foods, including fruits and vegetables. Most adults need about 5 grams of potassium a day. The foods you eat should supply all that you need. Some health conditions can cause a loss of potassium. For example, kidney problems and stomach problems with vomiting and diarrhea can cause you to lose this mineral. Some medicines, such as water pills (diuretics), can cause low potassium. If you can't get enough potassium from what you eat, your doctor may advise you to take supplements. Follow-up care is a key part of your treatment and safety. Be sure to make and go to all appointments, and call your doctor if you are having problems. It's also a good idea to know your test results and keep a list of the medicines you take. How can you care for yourself at home? · Plan your diet around foods that are rich in potassium. Fresh, unprocessed whole foods have the most. These foods include:  ? Milk and other dairy products. ? Vegetables, especially broccoli, cooked dry beans, tomatoes, potatoes, artichokes, winter squash, and spinach. ? Fruits, especially citrus fruits, bananas, and apricots. Dried apricots contain more potassium than fresh apricots. ? Meat, poultry, and fish. · Ask your doctor about using a salt substitute or \"light\" salt. These often contain potassium. Where can you learn more? Go to http://candice-carina.info/. Enter H315 in the search box to learn more about \"Potassium-Rich Diet: Care Instructions. \"  Current as of: November 7, 2018  Content Version: 12.2  © 7057-2670 [employer]. Care instructions adapted under license by [employer] (which disclaims liability or warranty for this information). If you have questions about a medical condition or this instruction, always ask your healthcare professional. [address] any warranty or liability for your use of this information.

## 2020-02-12 NOTE — PROGRESS NOTES
HPI:  61 y.o.  presents for follow up appointment. No hospital, ER or specialist visits since last primary care visit except as noted below. Follow up of lab results from 2/03/2020:  A1C imprvoed to 6.5% from 8.0%!! CHL and TG up while off statin. Although liver enzymes remain elevated, ok per hepatology to resume statin and monitor. She saw hepatology, notes reviewed, last visit with Kaushal hCoi NP, 1/30/2020, no cirrhosis, can resume statin, start Hep B vaccine, prior exposure to Hep A, NSAIDs OK ,may continue meloxicam prn. I have been in contact with NP Ruddy Ellison, and we will closely monitor her liver enzymes together as her statin is resumed. DM: Checks blood glucose each morning and usually 130-140. A1C 6.5% on 2/03/2020 Takes medications as directed. On metformin  mg BID. No polyuria/polydipsia. Opthalmology appointment 10/2019 with Dr. Phillip Choi. Checks feet, and no sores noted. No tingling or numbness in feet. Hypertension - compliant with medication, on losartan 100 mg daily, home monitoring not checked lately, but checked it last week and recalls systolic [id number], was 248/78 at GI office last month. No history of heart disease or stroke. No chest pain, no shortness of breath, no headaches, no lower extremity swelling. Patient Active Problem List    Diagnosis    Elevated LFTs    Positive FIT (fecal immunochemical test)    Fatty liver     With elevated LFTs      Type 2 diabetes mellitus without complication, without long-term current use of insulin (HCC)    High triglycerides    Hypertension    Hyperglycemia    Back pain     Left possibly due to musculoskeletal      Leg pain, left    Heel spur     Left.   Dr. Deisy Beckwith Fibrocystic breast changes    Other and unspecified hyperlipidemia    Vitamin D deficiency    Iron deficiency anemia         Past Medical History:   Diagnosis Date    Advanced care planning/counseling discussion 5/24/16    Allergic rhinitis, cause unspecified 10/2003    Atypical squamous cell changes of undetermined significance (ASCUS) on vaginal cytology 2/13/2018    Breast mass, right 10/12/11    Right. Dr. Christiane Brito. benign. .    Carpal tunnel syndrome 09/2012    bilaterally. Dr. Lily Phillips.  Chest pain 03/2012    Dr. Gustavo Walker    Elevated alkaline phosphatase level 05/16/04    125    Essential hypertension, benign 09/18/12    Fibrocystic breast changes     Heel spur 2010    Left. Dr. Ofe Toney    Herpes zoster 07/2003    Right chest.    Hyperglycemia 8/23/2012    Iron deficiency anemia 10/2003    Menorrhagia 2006    due to endometrial polyps. Dr. Rudean Curling Other and unspecified hyperlipidemia 0440    Ovarian follicular cyst 94/30/26    bilaterally.  Positive FIT (fecal immunochemical test) 2/22/2018    Tennis elbow 09/2012    Right. Dr. Wendy Zavala.  Trigger finger 09/2012    Left thumb. Right middle. Dr. Wendy Zavala.  Unspecified vitamin D deficiency 04/2009       Social History     Tobacco Use    Smoking status: Never Smoker    Smokeless tobacco: Never Used   Substance Use Topics    Alcohol use: No    Drug use: No       Outpatient Medications Marked as Taking for the 2/12/20 encounter (Office Visit) with Cris Delarosa PA-C   Medication Sig Dispense Refill    naproxen (NAPROSYN) 500 mg tablet TAKE 1 TABLET BY MOUTH TWICE A DAY AS NEEDED      losartan (COZAAR) 100 mg tablet TAKE 1 TABLET BY MOUTH EVERY DAY 30 Tab 5    atorvastatin (LIPITOR) 40 mg tablet Take 1 Tab by mouth daily. 90 Tab 3    omeprazole (PRILOSEC) 10 mg capsule TAKE 1 CAPSULE BY MOUTH EVERY DAY 90 Cap 1    metFORMIN ER (GLUCOPHAGE XR) 500 mg tablet Take 1 Tab by mouth two (2) times daily (with meals). TAKE 1 TABLET BY MOUTH EVERY  Tab 1    cholecalciferol, vitamin D3, (VITAMIN D3) 2,000 unit Tab Take 5,000 Int'l Units by mouth daily.          Allergies   Allergen Reactions    Pcn [Penicillins] Hives    Lisinopril Cough Dry cough    Nsaids (Non-Steroidal Anti-Inflammatory Drug) Other (comments)     Abdominal pain       ROS:  ROS negative except as per HPI. PE:  Visit Vitals  /67   Pulse 75   Temp 96.6 °F (35.9 °C) (Oral)   Resp 18   Ht 5' 5\" (1.651 m)   Wt 174 lb (78.9 kg)   LMP 03/04/2012   SpO2 96%   BMI 28.96 kg/m²     Gen: alert, oriented, no acute distress  Head: normocephalic, atraumatic  Resp: no increase work of breathing, lungs clear to ausculation bilaterally, no wheezing, rales or rhonchi  CV: S1, S2 normal.  No murmurs, rubs, or gallops. Abd: soft, not tender, not distended. Neuro: not focal  Skin: no lesion or rash  Extremities: no cyanosis or edema    No results found for this visit on 02/12/20. Assessment/Plan:      ICD-10-CM ICD-9-CM    1. Controlled type 2 diabetes mellitus without complication, without long-term current use of insulin (HCC) - A1C 6.5%, (previously 8%, 6.7% 6.4%); now on metformin ER 1000 mg daily, with fatty liver, will maximize metformin and she will titrate up to metformin ER 1000 mg BID E11.9 250.00 metFORMIN ER (GLUCOPHAGE XR) 500 mg tablet   2. Dyslipidemia - CHL and TG up while off statin. Although liver enzymes remain elevated, ok per hepatology to resume statin and monitor. She will restart atorvastatin 40 mg and recheck hepatic panel 6 wks E78.5 272.4    3. Fatty liver - followed by hepatology NP Lala Brownlee, I will maximize her DM therapy, resume statin and recheck labs 6 wks K76.0 571.8    4. Essential hypertension - 138/80 (prerviously 124/70, 158/89, 150/88). Gets readings at home in the 130s-140s. On losartan 100 mg. Will switch to Hyzaar 100/12.5, recheck BMP with next labs, [facility] 401.9 losartan-hydroCHLOROthiazide (HYZAAR) 100-12.5 mg per tablet      METABOLIC PANEL, BASIC      [facility]             Health Maintenance reviewed - updated.         Medications Discontinued During This Encounter   Medication Reason    losartan (COZAAR) 100 mg tablet Alternate Therapy    metFORMIN ER (GLUCOPHAGE XR) 500 mg tablet Reorder       Current Outpatient Medications   Medication Sig Dispense Refill    naproxen (NAPROSYN) 500 mg tablet TAKE 1 TABLET BY MOUTH TWICE A DAY AS NEEDED      losartan-hydroCHLOROthiazide (HYZAAR) 100-12.5 mg per tablet Take 1 Tab by mouth daily. THIS IS NEW. TAKE THIS INSTEAD OF PLAIN LOSARTAN 90 Tab 1    metFORMIN ER (GLUCOPHAGE XR) 500 mg tablet Take 2 Tabs by mouth two (2) times daily (with meals). TAKE 1 TABLET BY MOUTH EVERY  Tab 1    atorvastatin (LIPITOR) 40 mg tablet Take 1 Tab by mouth daily. 90 Tab 3    omeprazole (PRILOSEC) 10 mg capsule TAKE 1 CAPSULE BY MOUTH EVERY DAY 90 Cap 1    cholecalciferol, vitamin D3, (VITAMIN D3) 2,000 unit Tab Take 5,000 Int'l Units by mouth daily.  albuterol (PROVENTIL HFA, VENTOLIN HFA, PROAIR HFA) 90 mcg/actuation inhaler Take 2 Puffs by inhalation every six (6) hours as needed for Wheezing. 1 Inhaler 0       Recommended healthy diet low in carbohydrates, fats, sodium and cholesterol. Recommended regular cardiovascular exercise 3-6 times per week for 30-60 minutes daily. Verbal and written instructions (see AVS) provided. Patient expresses understanding of diagnosis and treatment plan. Follow-up and Dispositions    · Return in about 3 months (around 5/12/2020) for HTN, DM.        Future Appointments   Date Time Provider Remi Parrai   3/9/2020 11:15 AM Tracey Taylor, AB[facility]   3/24/2020 10:00 AM LAB [facility] SCHED   5/6/2020 11:00 AM Cris Delarosa, PA-C [facility] SCHED

## 2020-03-11 DIAGNOSIS — I10 ESSENTIAL HYPERTENSION: ICD-10-CM

## 2020-04-30 DIAGNOSIS — K21.9 GASTROESOPHAGEAL REFLUX DISEASE, ESOPHAGITIS PRESENCE NOT SPECIFIED: ICD-10-CM

## 2020-05-02 RX ORDER — OMEPRAZOLE 10 MG/1
CAPSULE, DELAYED RELEASE ORAL
Qty: 90 CAP | Refills: 1 | Status: SHIPPED | OUTPATIENT
Start: 2020-05-02 | End: 2020-12-02

## 2020-05-05 DIAGNOSIS — Z87.09 HISTORY OF REACTIVE AIRWAY DISEASE: ICD-10-CM

## 2020-05-06 RX ORDER — ALBUTEROL SULFATE 90 UG/1
AEROSOL, METERED RESPIRATORY (INHALATION)
Qty: 8.5 INHALER | Refills: 0 | Status: SHIPPED | OUTPATIENT
Start: 2020-05-06

## 2020-06-05 ENCOUNTER — TELEPHONE (OUTPATIENT)
Dept: FAMILY MEDICINE CLINIC | Age: 60
End: 2020-06-05

## 2020-06-05 NOTE — TELEPHONE ENCOUNTER
Patient currently taking metformin and saw a news report that this is being recalled. Wants to know what she should do about her medication?

## 2020-06-08 NOTE — TELEPHONE ENCOUNTER
Please call pt: The metformin medication itself is still safe, it was just recalled from certain manufacturers. She should ask the pharmacy for replacement from different . If her pharmacy is currently out of stock or unable to get the medicine, she may need to call other pharmacies to inquire about availability. If needed I can send in new RX for the metformin, just let me know to which pharmacy.

## 2020-06-09 NOTE — TELEPHONE ENCOUNTER
Called pt regarding Metformin recall. Pt states that she asked [facility] was her Brand safe and they stated that it was. Pt has no other questions or concerns at this time.

## 2020-07-12 DIAGNOSIS — E11.9 CONTROLLED TYPE 2 DIABETES MELLITUS WITHOUT COMPLICATION, WITHOUT LONG-TERM CURRENT USE OF INSULIN (HCC): ICD-10-CM

## 2020-07-14 RX ORDER — METFORMIN HYDROCHLORIDE 500 MG/1
TABLET, EXTENDED RELEASE ORAL
Qty: 120 TAB | Refills: 5 | Status: SHIPPED | OUTPATIENT
Start: 2020-07-14 | End: 2021-08-16

## 2020-08-09 DIAGNOSIS — I10 ESSENTIAL HYPERTENSION: ICD-10-CM

## 2020-08-10 RX ORDER — LOSARTAN POTASSIUM AND HYDROCHLOROTHIAZIDE 12.5; 1 MG/1; MG/1
TABLET ORAL
Qty: 30 TAB | Refills: 8 | Status: SHIPPED | OUTPATIENT
Start: 2020-08-10 | End: 2021-06-04

## 2020-09-28 ENCOUNTER — OFFICE VISIT (OUTPATIENT)
Dept: FAMILY MEDICINE CLINIC | Age: 60
End: 2020-09-28
Payer: COMMERCIAL

## 2020-09-28 VITALS
BODY MASS INDEX: 29.34 KG/M2 | DIASTOLIC BLOOD PRESSURE: 82 MMHG | OXYGEN SATURATION: 95 % | WEIGHT: 176.1 LBS | TEMPERATURE: 97.8 F | RESPIRATION RATE: 17 BRPM | SYSTOLIC BLOOD PRESSURE: 136 MMHG | HEIGHT: 65 IN | HEART RATE: 71 BPM

## 2020-09-28 DIAGNOSIS — K76.0 FATTY LIVER: ICD-10-CM

## 2020-09-28 DIAGNOSIS — E11.9 CONTROLLED TYPE 2 DIABETES MELLITUS WITHOUT COMPLICATION, WITHOUT LONG-TERM CURRENT USE OF INSULIN (HCC): Primary | ICD-10-CM

## 2020-09-28 DIAGNOSIS — I10 ESSENTIAL HYPERTENSION: ICD-10-CM

## 2020-09-28 DIAGNOSIS — R79.89 ELEVATED LFTS: ICD-10-CM

## 2020-09-28 DIAGNOSIS — E78.5 DYSLIPIDEMIA: ICD-10-CM

## 2020-09-28 DIAGNOSIS — M77.30 CALCANEAL SPUR, UNSPECIFIED LATERALITY: ICD-10-CM

## 2020-09-28 LAB
ALBUMIN SERPL-MCNC: 4.8 G/DL (ref 3.5–5)
ALBUMIN/GLOB SERPL: 1.6 {RATIO} (ref 1.1–2.2)
ALP SERPL-CCNC: 119 U/L (ref 45–117)
ALT SERPL-CCNC: 108 U/L (ref 12–78)
AST SERPL-CCNC: 55 U/L (ref 15–37)
BILIRUB DIRECT SERPL-MCNC: 0.1 MG/DL (ref 0–0.2)
BILIRUB SERPL-MCNC: 0.6 MG/DL (ref 0.2–1)
CHOLEST SERPL-MCNC: 172 MG/DL
EST. AVERAGE GLUCOSE BLD GHB EST-MCNC: 137 MG/DL
GLOBULIN SER CALC-MCNC: 3 G/DL (ref 2–4)
HBA1C MFR BLD: 6.4 % (ref 4–5.6)
HDLC SERPL-MCNC: 44 MG/DL
HDLC SERPL: 3.9 {RATIO} (ref 0–5)
LDLC SERPL CALC-MCNC: 77.6 MG/DL (ref 0–100)
LIPID PROFILE,FLP: ABNORMAL
PROT SERPL-MCNC: 7.8 G/DL (ref 6.4–8.2)
TRIGL SERPL-MCNC: 252 MG/DL (ref ?–150)
VLDLC SERPL CALC-MCNC: 50.4 MG/DL

## 2020-09-28 PROCEDURE — 99214 OFFICE O/P EST MOD 30 MIN: CPT | Performed by: PHYSICIAN ASSISTANT

## 2020-09-28 RX ORDER — NAPROXEN 500 MG/1
TABLET ORAL
Qty: 90 TAB | Refills: 1 | Status: SHIPPED | OUTPATIENT
Start: 2020-09-28 | End: 2020-12-31

## 2020-09-28 NOTE — PROGRESS NOTES
HPI:  61 y.o.  presents for follow up appointment. No hospital, ER or specialist visits since last primary care visit except as noted below. DM:  A1C 6.5% on 2/03/2020 Takes medications as directed. On metformin XR 1000 mg QHS. (At last visit 2/12/20 I wanted to maximize her DM therapy due to fatty liver and asked her to increase her metformin ER from 1000 mg daily up to metformin ER 1000 mg BID; she did not increase, states she was just \"lazy\", mentioned stomach upset at first but on further questioning she did not increase the dose and feels well on 2 pills at dinner, we discussed strategy to slowly titrate up her metformin). No polyuria/polydipsia. Opthalmology appointment 2/2020 Dr. Apple Cabrera Checks feet, and no sores noted. No tingling or numbness in feet. Lab Results   Component Value Date/Time    Hemoglobin A1c 6.5 (H) 02/03/2020 09:42 AM    Hemoglobin A1c 8.0 (H) 08/26/2019 12:35 PM    Hemoglobin A1c 6.7 (H) 02/28/2019 10:09 AM     Trying to lose weight and did well with walking and exercise at the beginning of the pandemic when able to do so with her , but has not been walking recently.     Hypertension - compliant with medication, on hyzaar 100/12.5 mg daily (changed from losartan 100 mg at last visit 2/12/20), home monitoring recalls 130s-140s/70s-80s  No history of heart disease or stroke. No chest pain, no shortness of breath, no headaches, no lower extremity swelling. XOL - I had stopped her statin due to elevated liver enzymes, her CHL and TG went up while off statin, she is seeing hepatology due to fatty liver; I had reviewed with NP Yarelis Willingham and ok to resume statin with careful monitoring, she resumed atorvastatin 40 mg in Feb 2020.   Lab Results   Component Value Date/Time    Cholesterol, total 298 (H) 02/03/2020 09:50 AM    HDL Cholesterol 37 (L) 02/03/2020 09:42 AM    LDL, calculated Comment 02/03/2020 09:42 AM    VLDL, calculated Comment 02/03/2020 09:42 AM    Triglyceride 455 (H) 02/03/2020 09:50 AM    CHOL/HDL Ratio 5.7 (H) 05/04/2010 10:53 AM         Fatty liver and elevated liver enzymes, per my last note:  She saw hepatology, notes reviewed, last visit with Faiza Pinedo NP, 1/30/2020, no cirrhosis, can resume statin, start Hep B vaccine, prior exposure to Hep A, NSAIDs OK ,may continue meloxicam prn. I have been in contact with ABHAY Vergara, and we will closely monitor her liver enzymes together as her statin is resumed. No further visit with hepatology since last visit noted 1/30/20    Lab Results   Component Value Date/Time    ALT (SGPT) 148 (H) 02/03/2020 09:50 AM    ALT (SGPT) 165 (H) 02/03/2020 09:50 AM    AST (SGOT) 72 (H) 02/03/2020 09:50 AM    AST (SGOT) 87 (H) 02/03/2020 09:50 AM    Alk. phosphatase 115 02/03/2020 09:50 AM    Bilirubin, direct 0.11 02/03/2020 09:50 AM    Bilirubin, total 0.4 02/03/2020 09:50 AM    Bilirubin, total 0.2 02/03/2020 09:50 AM         Heel spur - take naproxen QHS, tries to take every other day for the most part but occasionally may take it every night, has used gel heel cup and cushioned socks. Another NSAID caused abdominal pain but naproxen is ok. NSAID ok per hepatology        Patient Active Problem List    Diagnosis    Elevated LFTs    Positive FIT (fecal immunochemical test)    Fatty liver     With elevated LFTs      Type 2 diabetes mellitus without complication, without long-term current use of insulin (HCC)    High triglycerides    Hypertension    Hyperglycemia    Back pain     Left possibly due to musculoskeletal      Leg pain, left    Heel spur     Left.   Dr. Reid Díaz Fibrocystic breast changes    Other and unspecified hyperlipidemia    Vitamin D deficiency    Iron deficiency anemia         Past Medical History:   Diagnosis Date    Advanced care planning/counseling discussion 5/24/16    Allergic rhinitis, cause unspecified 10/2003    Atypical squamous cell changes of undetermined significance (ASCUS) on vaginal cytology 2/13/2018    Breast mass, right 10/12/11    Right. Dr. Destinee Corrales. benign. .    Carpal tunnel syndrome 09/2012    bilaterally. Dr. Brian Hill.  Chest pain 03/2012    Dr. Rosangela Baugh    Elevated alkaline phosphatase level 05/16/04    125    Essential hypertension, benign 09/18/12    Fibrocystic breast changes     Heel spur 2010    Left. Dr. Ingrid Paul    Herpes zoster 07/2003    Right chest.    Hyperglycemia 8/23/2012    Iron deficiency anemia 10/2003    Menorrhagia 2006    due to endometrial polyps. Dr. Carline John Other and unspecified hyperlipidemia 7444    Ovarian follicular cyst 81/18/78    bilaterally.  Positive FIT (fecal immunochemical test) 2/22/2018    Tennis elbow 09/2012    Right. Dr. Enoch Koyanagi.  Trigger finger 09/2012    Left thumb. Right middle. Dr. Enoch Koyanagi.  Unspecified vitamin D deficiency 04/2009       Social History     Tobacco Use    Smoking status: Never Smoker    Smokeless tobacco: Never Used   Substance Use Topics    Alcohol use: No    Drug use: No       Outpatient Medications Marked as Taking for the 9/28/20 encounter (Office Visit) with Cris Delarosa PA-C   Medication Sig Dispense Refill    losartan-hydroCHLOROthiazide (HYZAAR) 100-12.5 mg per tablet TAKE 1 TABLET BY MOUTH EVERY DAY 30 Tab 8    metFORMIN ER (GLUCOPHAGE XR) 500 mg tablet TAKE 2 TABLETS BY MOUTH TWICE A DAY WITH MEALS 120 Tab 5    albuterol (PROVENTIL HFA, VENTOLIN HFA, PROAIR HFA) 90 mcg/actuation inhaler INHALE 2 PUFFS EVERY 6 HOURS AS NEEDED FOR WHEEZE 8.5 Inhaler 0    omeprazole (PRILOSEC) 10 mg capsule TAKE 1 CAPSULE BY MOUTH EVERY DAY 90 Cap 1    naproxen (NAPROSYN) 500 mg tablet TAKE 1 TABLET BY MOUTH TWICE A DAY AS NEEDED      atorvastatin (LIPITOR) 40 mg tablet Take 1 Tab by mouth daily. 90 Tab 3    cholecalciferol, vitamin D3, (VITAMIN D3) 2,000 unit Tab Take 5,000 Int'l Units by mouth daily.          Allergies   Allergen Reactions    Pcn [Penicillins] Hives    Lisinopril Cough     Dry cough    Nsaids (Non-Steroidal Anti-Inflammatory Drug) Other (comments)     Abdominal pain       ROS:  ROS negative except as per HPI. PE:  Visit Vitals  /82 (BP 1 Location: Left arm, BP Patient Position: Sitting)   Pulse 71   Temp 97.8 °F (36.6 °C) (Temporal)   Resp 17   Ht 5' 5\" (1.651 m)   Wt 176 lb 1.6 oz (79.9 kg)   LMP 03/04/2012   SpO2 95%   BMI 29.30 kg/m²     Gen: alert, oriented, no acute distress  Head: normocephalic, atraumatic  Eyes: pupils equal round reactive to light, sclera clear, conjunctiva clear  Oral: mask on  Neck: symmetric normal sized thyroid, no carotid bruits, no jugular vein distention  Resp: no increase work of breathing, lungs clear to ausculation bilaterally, no wheezing, rales or rhonchi  CV: S1, S2 normal.  No murmurs, rubs, or gallops. Neuro: not focal  Skin: no lesion or rash  Extremities: no cyanosis or edema    Diabetic foot exam:     Left Foot:   Visual Exam: normal    Pulse DP: 2+ (normal)   Filament test: normal sensation          Right Foot:   Visual Exam: normal    Pulse DP: 2+ (normal)   Filament test: normal sensation            No results found for this visit on 09/28/20. Assessment/Plan:    1. Controlled type 2 diabetes mellitus without complication, without long-term current use of insulin (HCC)  - A1C 6.5%, (previously 8%, 6.7% 6.4%);   -now on metformin ER 1000 mg daily  -with fatty liver I wanted to maximize her metformin therapy but she did not titrate up after last visit for no particular reason  -reviewed slow titration up to metformin ER 1000 mg BID  -on statin  -on ARB  -eye exam UTD  -foot exam today normal  -microalbumin negative 2/2019, will check today  -DM, trouble with diet and weight loss, fatty liver, so will refer to DEP combined with both MNT as well  - HEMOGLOBIN A1C WITH EAG;  Future  - MICROALBUMIN, UR, RAND W/ MICROALB/CREAT RATIO; Future  - HM DIABETES FOOT EXAM  - REFERRAL TO DIABETIC EDUCATION; Standing    2. Dyslipidemia  -CHL and TG up while off statin. Although liver enzymes remain elevated, ok per hepatology to resume statin and monitor. She restarted atorvastatin 40 mg 2/2020  -recheck labs today  - LIPID PANEL; Future  - HEPATIC FUNCTION PANEL; Future  - REFERRAL TO DIABETIC EDUCATION; Standing    3. Fatty liver  -followed by hepatology NP Mine Guillen, I will maximize her DM therapy, statin as been resumed  -labs today after restarting statin and will review with ABHAY Guillen once results are in   -refer to MNT to help with weight loss  - HEPATIC FUNCTION PANEL; Future  - REFERRAL TO DIABETIC EDUCATION; Standing    4. Elevated LFTs  -due to fatty liver, monitoring on statin and followed by hepatology ABHAY Guillen  - HEPATIC FUNCTION PANEL; Future    5. Essential hypertension  136/82 (previously 138/80 124/70, 158/89, 150/88). - on hyzaar 100/12.5 mg daily (changed from losartan 100 mg at last visit 2/12/20)  -well controlled. Continue current medication. Exercise, and low salt/ low caffeine diet were discussed. 6. Calcaneal spur, unspecified laterality  Limited use of naproxen OK  - naproxen (NAPROSYN) 500 mg tablet; TAKE 1 TABLET BY MOUTH TWICE A DAY AS NEEDED  Dispense: 90 Tab; Refill: 1      Health Maintenance reviewed - updated.     Orders Placed This Encounter    LIPID PANEL     Standing Status:   Future     Standing Expiration Date:   9/28/2021    HEMOGLOBIN A1C WITH EAG     Standing Status:   Future     Standing Expiration Date:   9/28/2021    HEPATIC FUNCTION PANEL     Standing Status:   Future     Standing Expiration Date:   9/28/2021    MICROALBUMIN, UR, RAND W/ MICROALB/CREAT RATIO     Standing Status:   Future     Standing Expiration Date:   9/28/2021       Medications Discontinued During This Encounter   Medication Reason    naproxen (NAPROSYN) 500 mg tablet Reorder       Current Outpatient Medications   Medication Sig Dispense Refill    naproxen (NAPROSYN) 500 mg tablet TAKE 1 TABLET BY MOUTH TWICE A DAY AS NEEDED 90 Tab 1    losartan-hydroCHLOROthiazide (HYZAAR) 100-12.5 mg per tablet TAKE 1 TABLET BY MOUTH EVERY DAY 30 Tab 8    metFORMIN ER (GLUCOPHAGE XR) 500 mg tablet TAKE 2 TABLETS BY MOUTH TWICE A DAY WITH MEALS 120 Tab 5    albuterol (PROVENTIL HFA, VENTOLIN HFA, PROAIR HFA) 90 mcg/actuation inhaler INHALE 2 PUFFS EVERY 6 HOURS AS NEEDED FOR WHEEZE 8.5 Inhaler 0    omeprazole (PRILOSEC) 10 mg capsule TAKE 1 CAPSULE BY MOUTH EVERY DAY 90 Cap 1    atorvastatin (LIPITOR) 40 mg tablet Take 1 Tab by mouth daily. 90 Tab 3    cholecalciferol, vitamin D3, (VITAMIN D3) 2,000 unit Tab Take 5,000 Int'l Units by mouth daily. Recommended healthy diet low in carbohydrates, fats, sodium and cholesterol. Recommended regular cardiovascular exercise 3-6 times per week for 30-60 minutes daily. Verbal and written instructions (see AVS) provided. Patient expresses understanding of diagnosis and treatment plan. Follow-up and Dispositions    · Return in about 3 months (around 12/28/2020) for HTN, DM.

## 2020-09-28 NOTE — PROGRESS NOTES
Parris Gasca is a 61 y.o. female    HIPAA verified by two patient identifiers. Chief Complaint   Patient presents with    Blood Pressure Check    Follow-up     3 month f/u    Labs     1. Have you been to the ER, urgent care clinic since your last visit? Hospitalized since your last visit? No    2. Have you seen or consulted any other health care providers outside of the [address] [place] [address] since your last visit? Include any pap smears or colon screening.  No    Visit Vitals  /82 (BP 1 Location: Left arm, BP Patient Position: Sitting)   Pulse 71   Temp 97.8 °F (36.6 °C) (Temporal)   Resp (!) 95   Ht 5' 5\" (1.651 m)   Wt 176 lb 1.6 oz (79.9 kg)   LMP 03/04/2012   BMI 29.30 kg/m²       Pain Scale: 0 - No pain/10  Pain Location:

## 2020-09-28 NOTE — PATIENT INSTRUCTIONS
Send us records from: [facility] with all of your immunizations Eye doctor with the diabetic eye exam 
 
Slowly increase the metformin like we discussed: 
Stay on the 2 tablets at dinner and add one tablet at breakfast, stay at this dose for several weeks to make sure your stomach is ok. Then when ready add the second pill at breakfast. If this upsets your stomach a lot, you could take the 2nd pill at lunch instead, while still taking 1 at breakfast and 2 at dinner.

## 2020-09-29 LAB
CREAT UR-MCNC: 114 MG/DL
MICROALBUMIN UR-MCNC: 0.68 MG/DL
MICROALBUMIN/CREAT UR-RTO: 6 MG/G (ref 0–30)

## 2020-10-05 NOTE — PROGRESS NOTES
Paradine message sent to patient. The diabetes control is stable to slightly improved! The A1C is now 6.4% and was 6.5% on last check. The urine test is normal.  The cholesterol is much improved now back on the atorvastatin and the liver enzymes are still elevated, but stable to slightly better than last check. The triglycerides are still elevated but much improved. Let's hold steady at the same atorvastatin dose and the triglycerides will likely improve with dietary measures after you have had the Diabetes Education Classes.

## 2020-10-07 ENCOUNTER — VIRTUAL VISIT (OUTPATIENT)
Dept: DIABETES SERVICES | Age: 60
End: 2020-10-07

## 2020-10-07 DIAGNOSIS — E11.9 TYPE 2 DIABETES MELLITUS WITHOUT COMPLICATION, WITHOUT LONG-TERM CURRENT USE OF INSULIN (HCC): ICD-10-CM

## 2020-10-07 PROCEDURE — G0108 DIAB MANAGE TRN  PER INDIV: HCPCS | Performed by: DIETITIAN, REGISTERED

## 2020-10-07 NOTE — PROGRESS NOTES
[employer]  Diabetes Self-Management Education   Pre-program Assessment    Reason for Referral: DSMES  Referral Source: [facility]    Metric Patient responses (10/7/2020)   A1c  (Goal: 7%)   Recent value:   Lab Results   Component Value Date/Time    Hemoglobin A1c 6.4 (H) 09/28/2020 12:00 PM        Healthy Eating     24-hour Dietary Recall:  Breakfast: bagel and cream cheese  Lunch: rice with shrimp  Dinner: slice of pizza and one chicken wing  Snacks: 2 slices of bread with piece of chicken  Beverages: tea with sugar and milk  Alcohol: none    Stage of change: Contemplation     Being Active     Physical Activity Vital Sign:  How many days during the past week have you performed physical activity where your heart beats faster and your breathing is harder than normal for 30 minutes or more?  0 days    How many days in a typical week do you perform activity such as this?  0 days    Stage of change: Precontemplation     Monitoring Do you monitor your blood sugar? Yes    How often do you monitor? <1x/day    What are the range of readings? 125-150 mg/dL  Breakfast: 140  Lunch: does not check  Dinner: does not check  Bedtime: does not check    Do you know your last A1c measurement? Yes    Do you know the meaning of the A1c? No    Stage of change: Action     Taking Medications Medication Management:  Do you understand what your diabetes medications do? No    Can you afford your diabetes medications?  Yes    How often do you miss doses of your diabetes medications? pt says she never misses a dose, but has not started taking metformin BID as prescribed, only once a day    Current dosing:   Key Antihyperglycemic Medications             metFORMIN ER (GLUCOPHAGE XR) 500 mg tablet TAKE 2 TABLETS BY MOUTH TWICE A DAY WITH MEALS          Blood Pressure Management:  Key ACE/ARB Medications             losartan-hydroCHLOROthiazide (HYZAAR) 100-12.5 mg per tablet TAKE 1 TABLET BY MOUTH EVERY DAY Lipid Management:  Key Antihyperlipidemia Meds             atorvastatin (LIPITOR) 40 mg tablet Take 1 Tab by mouth daily. Clot Prevention:  Key Anti-Platelet Anticoagulant Meds     The patient is on no antiplatelet meds or anticoagulants. Stage of change: Action     Healthy Coping Diabetes Skills, Confidence and Preparedness Index: Total score: 6.0  Skills: 5.9  Confidence: 6.0  Preparedness: 6.0    Stage of change: Preparation     Reducing Risks Vaccines:  Influenza:   Immunization History   Administered Date(s) Administered    Influenza Vaccine 10/11/2013, 10/01/2016, 10/11/2017, 08/14/2019, 09/14/2020    Influenza Vaccine (Mdck Quadrivalent)(>4 Yrs Flucelvax Quad vial [id number]) 09/07/2018    Influenza Vaccine MolecularMD) PF (>6 Mo Flulaval, Fluarix, and >3 Yrs Afluria, Fluzone [id number]) 10/07/2014, 08/14/2019       Pneumococcal:   Immunization History   Administered Date(s) Administered    Pneumococcal Polysaccharide (PPSV-23) 08/31/2017        Hepatitis: There is no immunization history for the selected administration types on file for this patient. Examinations:  Diabetic Foot and Eye Exam HM Status   Topic Date Due    Eye Exam  05/28/1970    Diabetic Foot Care  09/28/2021        Dental exam: Last appointment was: week of 9/28    Foot exam: Last appointment was: 9/28    Heart Protection:  BP Readings from Last 2 Encounters:   09/28/20 136/82   02/12/20 138/80        Lab Results   Component Value Date/Time    LDL, calculated 77.6 09/28/2020 12:00 PM        Kidney Protection:  Lab Results   Component Value Date/Time    Microalbumin/Creat ratio (mg/g creat) 6 09/28/2020 12:00 PM    Microalbumin,urine random 0.68 09/28/2020 12:00 PM       Patient-reported diabetes complications:  peripheral neuropathy    Stage of change: Action     Problem Solving Hypoglycemia Management:  What are signs and symptoms of hypoglycemia that you experience?  Pt reported being unaware of s/s of hypoglycemia    How do you prevent hypoglycemia? Consistent meals/snack times    How do you treat hypoglycemia? candy    Hyperglycemia Management:  What are signs and symptoms of hyperglycemia that you experience? Pt reported being unaware of s/s of hyperglycemia    How can you prevent hyperglycemia? Pt reported being unaware of how to prevent hyperglycemia    Sick Day Management:  What do you do differently on sick days? Pt reported being unaware of self-management on sick days    Pattern Management:  Do you notice blood glucose patterns when you look at the readings in your meter or logbook? No    How do you use the blood glucose readings from your meter or logbook? Pt reported being unaware of pattern management skills    Stage of change: Contemplation   Note: Content derived from the American Association of Diabetes Educators' Diabetes Education Curriculum: A Guide to Successful Self-Management (2nd edition)         Diabetes Educator Recommendations:   - Address diabetes self-care behaviors through education and support using AADE7 Curriculum. Pt to attend weekly individual sessions on reducing risks, healthy eating, being active, monitoring, taking medications, healthy coping and problem solving.     - Patient intends to focus on these diabetes self-care practices: Healthy eating and Problem solving      - Pt to follow up with provider on the following: pt reports having muscle pain for 6 mos or more that she thought was associated with her heel spur and was told to call provider       Diabetes Self-Management Education Follow-up Visit: 10/14/20 at 3:15 pm    [address] Emergency [employer] for Telehealth:  Nehemias Ramos is a 61 y.o. female being evaluated through a synchronous (real-time) audio-video technology platform, as a substitution for an in-person encounter, to address the healthcare issues mentioned above. I was in the office. The patient was at home.  A caregiver was present when appropriate. The patient and/or her healthcare decision maker, is aware that this patient-initiated, Telehealth encounter on 10/7/2020 is a billable service, with coverage as determined by her insurance carrier. She is aware that she may receive a bill and has provided verbal consent to proceed: Yes. This telehealth encounter occurred during the COVID-19 pandemic and public health emergency. Evaluation of the following organ systems was limited: Vitals/Constitutional/EENT/Resp/CV/GI//MS/Neuro/Skin/Heme-Lymph-Imm. Pursuant to the emergency declaration under the [address] [place] [address], [address] [place] [address] authority and the [employer] and [employer] Act, this Virtual Visit was conducted with patient's (and/or legal guardian's) consent, to reduce the risk of exposure to COVID-19 and provide necessary medical care. --Martín Gomez RD on 10/7/2020 at 2:45 PM    An electronic signature was used to authenticate this note. I was in the office for the appointment and time spent: 35 minutes    Overall SCPI score: 6.0 Skills Score: 5.9  Low: Healthy Eating(Q1),Blood Sugar Monitoring(Q8) Confidence Score: 6.0  Low: Healthy Eating(Q1),Healthy Coping(Q2),Reducing Risks(Q3),Healthy Eating(Q4),Being Active(Q5),Blood Sugar Monitoring(Q6),Problem Solving(Q7) Preparedness Score: 6.0  Low: Reducing Risks(Q4)  Healthy Eating Score: 5.8  Low: Skills(Q1) Taking Medication Score: 6.0  Low: Skills(Q2) Blood Sugar Monitoring Score: 5.8  Low: Skills(Q8) Reducing Risks Score: 6.0  Low: Preparedness(Q4)  Problem Solving Score: 6.0  Low: Skills(Q6),Confidence(Q7),Preparedness(Q7) Healthy Coping Score: 6.0  Low: Skills(Q7),Confidence(Q2),Preparedness(Q3) Being Active Score: 6.5  Low: Confidence(Q5)    Skills/Knowledge Questions  1. I know how to plan meals that have the best balance between carbohydrates, proteins and vegetables. 5  2.  I know how my diabetes medications (pills, injectables and/or insulin) work in my body. 6  3. I know when to check my blood sugar if I want to see how my body responded to a meal. 6  4. I know when to check my blood sugars to determine if my medication or insulin doses are correct. 6  5. I know what to do to prevent a low blood sugar when I exercise (either before, during, or after). 6  6. When I am sick, I know what to do differently with my diabetes management. 6  7. I know how stress can affect my diabetes management. 6  8. When I look at my blood sugars over a given week, I can explain what my blood sugar pattern is. 5  9. I know what my target levels are for A1c, blood pressure and cholesterol. 7  Confidence Questions  1. I am confident that I can plan balanced meals and snacks. 6  2. I am confident that I can manage my stress. 6  3. I am confident that I can prevent a low blood sugar during or after exercise. 6  4. I am confident that the next time I eat out, I will be able to choose foods that best keep my blood sugars in target. 6  5. I am confident I can include exercise into my schedule. 6  6. I am confident that I can use my daily blood sugars to adjust my diet, my activity, and/or my insulin. 6  7. When something out of my normal routine happens, I am confident that I can problem-solve and keep my diabetes on track. 6  Preparedness Questions  1. Within the next month, I will begin to eat more balanced meals and snacks. 6  2. Within the next month, I will choose an exercise activity and I will start fitting it into my schedule. 7  3. Within the next month, I will make a list of stress management options that work for me. 6  4. Within the next month, I will consistently plan ahead to prevent low blood sugars. 5  5. Within the next month, I will start adjusting my insulin doses on my own. 0  6. Within the next month, I will begin making changes to my diabetes management based on my daily blood sugars (eg - eating, activity and/or insulin). 6  7. Within the next month, I will begin making changes to my diabetes management to meet my overall goals (eg - eating, activity and/or insulin).  6

## 2020-10-14 ENCOUNTER — VIRTUAL VISIT (OUTPATIENT)
Dept: DIABETES SERVICES | Age: 60
End: 2020-10-14

## 2020-10-14 DIAGNOSIS — E11.65 TYPE 2 DIABETES MELLITUS WITH HYPERGLYCEMIA, WITHOUT LONG-TERM CURRENT USE OF INSULIN (HCC): Primary | ICD-10-CM

## 2020-10-14 PROCEDURE — G0108 DIAB MANAGE TRN  PER INDIV: HCPCS | Performed by: DIETITIAN, REGISTERED

## 2020-10-14 NOTE — PROGRESS NOTES
[facility]  Diabetes Self-Management Education   Education and Goal Plan for Session #1    AADE7 Self-Care Behaviors:  Behavior Education Completed (10/14/2020)   Healthy Eating   Not addressed during this session. Being Active   Not addressed during this session. Monitoring     Not addressed during this session. Taking Medications Not addressed during this session. Healthy Coping Not addressed during this session. Reducing Risks - Prevention of influenza  - Foot care and foot exam  - Dilated eye exam  - Prevention of pneumonia  - Prevention of hepatitis B  - Dental care and dental exam  - Hgb A1c target  - Long-term complications   Problem Solving Not addressed during this session. Note: Content derived from the American Association of Diabetes Educators' Diabetes Education Curriculum: A Guide to Successful Self-Management (2nd edition)         Diabetes Educator Recommendations:     - Continue addressing diabetes self-care behaviors through education and support in AADE7 Curriculum individual sessions on reducing risks, healthy eating, being active, monitoring, taking medications, healthy coping and problem solving.     - Continue practicing knowledge and skills relating to healthy eating, monitoring and being active to improve diabetes self-management. Discussed patient's lipid panel with her and she was very pleased how much her cholesterol, TG, HDL, and LDL had improved since February. Discussed her providers want to keep an eye on her liver function since those numbers were elevated, but hoping that learning the information from this program will help improve her numbers even more.     Pt and her  are going to check their blood sugars before breakfast, post lunch, post dinner, and before bed and discuss patterns along with food intake for next session.     - Patient's Identified SMART Goal(s): - Increase physical activity by walking for 30 minutes 3 times over the next week. - Pt to follow up with provider on the following: pt to call provider about muscle pain for last 6 months most likely caused by statin. Pt to ask provider for rx for meter because her and her  have been sharing one     Diabetes Self-Management Education Follow-up Visit: 10/21/2020 at 3 pm    [facility] for Telehealth:    Diane Pimentel is a 61 y.o. female being evaluated through a synchronous (real-time) audio-video technology platform, as a substitution for an in-person encounter, to address the healthcare issues mentioned above. I was in the office. The patient was at home. A caregiver was present when appropriate. The patient and/or her healthcare decision maker, is aware that this patient-initiated, Telehealth encounter on 10/14/2020 is a billable service, with coverage as determined by her insurance carrier. She is aware that she may receive a bill and has provided verbal consent to proceed: Yes. This telehealth encounter occurred during the COVID-19 pandemic and public health emergency. Evaluation of the following organ systems was limited: Vitals/Constitutional/EENT/Resp/CV/GI//MS/Neuro/Skin/Heme-Lymph-Imm. Pursuant to the emergency declaration under the [address] [place] [address], [address] [place] [address] waiver authority and the [employer] and [employer] Act, this Virtual Visit was conducted with patient's (and/or legal guardian's) consent, to reduce the risk of exposure to COVID-19 and provide necessary medical care. --Donovan Samuels RD on 10/14/2020 at 4:21 PM    An electronic signature was used to authenticate this note.  I was in the office for the appointment and time spent: 62 minutes

## 2020-10-21 ENCOUNTER — VIRTUAL VISIT (OUTPATIENT)
Dept: DIABETES SERVICES | Age: 60
End: 2020-10-21

## 2020-10-21 DIAGNOSIS — E11.9 TYPE 2 DIABETES MELLITUS WITHOUT COMPLICATION, WITHOUT LONG-TERM CURRENT USE OF INSULIN (HCC): Primary | ICD-10-CM

## 2020-10-21 PROCEDURE — G0108 DIAB MANAGE TRN  PER INDIV: HCPCS | Performed by: DIETITIAN, REGISTERED

## 2020-10-21 NOTE — PROGRESS NOTES
[facility]  Diabetes Self-Management Education   Education and Goal Plan for Session #2a    AADE7 Self-Care Behaviors:  Behavior Education Completed (10/21/2020)   Healthy Eating   - Impact of food on blood glucose levels  - Food sources of carbohydrates  - Meal size and portions  - Reading food labels  - Balanced plate  - Meal and snack timing  - Carbohydrate counting   - Importance of a variety of foods  - Heart healthy fats  - Reducing sodium  - Importance of not skipping meals  - Eating breakfast  - Controlling portions of carbohydrates  - Reducing sugar-sweetened beverages  - Comparing food choices  - Meal consistency     Chicken strip and salad - 162 2 hrs later  Rice and lentils - 217 2 hrs later  2 pieces of chicken leg and vegetable soup - 156 2 hrs later  Burrito and 1 small taco  Fish and 1/2 c rice  Salad and 3 chicken wings    Pt and  like carbs and protein at breakfast, and she does put vegetables in the eggs sometimes    At lunch, the couple does not seem to be getting enough vegetables with their protein and carbohydrates. Being Active   Not addressed during this session. Monitoring     Not addressed during this session. Taking Medications Not addressed during this session. Healthy Coping Not addressed during this session. Reducing Risks Education delivered in previous session. Problem Solving Not addressed during this session.    Note: Content derived from the American Association of Diabetes Educators' Diabetes Education Curriculum: A Guide to Successful Self-Management (2nd edition)         Diabetes Educator Recommendations:     - Continue addressing diabetes self-care behaviors through education and support in AADE7 Curriculum individual sessions on reducing risks, healthy eating, being active, monitoring, taking medications, healthy coping and problem solving.     - Continue practicing knowledge and skills relating to healthy eating, monitoring and being active to improve diabetes self-management. Pt shared her recall with me and it seems as though they are not getting enough carbs at dinner and not enough vegetables with lunch. She had 3 post dinner readings for me and 162, 217, and 156. Her fasting sugars are as follows: 140, 136, 118, 126, 134, 141, 150. She did have one reading post lunch and it was 146. She was worried her fasting sugar of 150 was too high and I assured her it was okay. Pt had a lot of questions about food and did most of the talking compared to her  because she does most of the cooking. We discussed saturated and trans fats a good amount due to her liver values. As mentioned, it seems as though they are not getting enough carbohydrates at dinner and not enough vegetables at lunch, which is something they realized as we were talking about the healthy plate. Pt and I went through tonight's dinner together and what would be about 45 g of carbs and she seemed okay with that considering their carb would be this bread she just bought. She even brought the bread over for me to look at, and since it was from the bakery there was no nutrition label, but I said since the bread is a pound, generally a serving of bread (in carbs) is 1 oz so try cutting the bread into 16 slices as best she can and that will be as close to 15 g of carbs as they could get it. Pt also asked about guava and if they could eat it at night for a dessert or snack if they got hungry because it is what they used to do. I said they can but it is good to know how much guava is 15 g of carbs so they can have that and add some protein to the snack to help with feeling more full and aiding in slowing down the rise in blood sugar.  Pt also said she makes fresh OJ sometimes and I explained how that is a fast acting carb and if they really want it try and water it down or only have about 4 oz and always have a protein or fat with it to slow down the quick rise in blood sugar. I also said it is something we normally suggest having when blood sugar is low and she understood. Spent the hour talking about food and will complete the monitoring portion of the class next week. - Patient's Identified SMART Goal(s): - Increase physical activity by walking for 30 minutes 3 times over the next week. Pt only walked one time in the last week and said she would keep trying.     - Pt to follow up with provider on the following: Pt did not have anything to follow up with provider about. She said the prescription for the meter was ready to  and she will go get it. Diabetes Self-Management Education Follow-up Visit: 10/28/2020 at 3 pm    [facility] for Telehealth:    Dar Nesbitt is a 61 y.o. female being evaluated through a synchronous (real-time) audio-video technology platform, as a substitution for an in-person encounter, to address the healthcare issues mentioned above. I was in the office. The patient was at home. A caregiver was present when appropriate. The patient and/or her healthcare decision maker, is aware that this patient-initiated, Telehealth encounter on 10/21/2020 is a billable service, with coverage as determined by her insurance carrier. She is aware that she may receive a bill and has provided verbal consent to proceed: Yes. This telehealth encounter occurred during the COVID-19 pandemic and public health emergency. Evaluation of the following organ systems was limited: Vitals/Constitutional/EENT/Resp/CV/GI//MS/Neuro/Skin/Heme-Lymph-Imm. Pursuant to the emergency declaration under the [facility], [address] [place] [address] authority and the [employer] and [employer] Act, this Virtual Visit was conducted with patient's (and/or legal guardian's) consent, to reduce the risk of exposure to COVID-19 and provide necessary medical care.      --Camella Ganser Laurie Colin RD on 10/21/2020 at 4:34 PM    An electronic signature was used to authenticate this note.  I was in the office for the appointment and time spent: 61 minutes

## 2020-10-28 ENCOUNTER — VIRTUAL VISIT (OUTPATIENT)
Dept: DIABETES SERVICES | Age: 60
End: 2020-10-28

## 2020-10-28 DIAGNOSIS — E11.9 TYPE 2 DIABETES MELLITUS WITHOUT COMPLICATION, WITHOUT LONG-TERM CURRENT USE OF INSULIN (HCC): Primary | ICD-10-CM

## 2020-10-28 PROCEDURE — G0108 DIAB MANAGE TRN  PER INDIV: HCPCS | Performed by: DIETITIAN, REGISTERED

## 2020-10-28 NOTE — PROGRESS NOTES
[facility]  Diabetes Self-Management Education   Education and Goal Plan for Session #2b and 3a    AADE7 Self-Care Behaviors:  Behavior Education Completed (10/28/2020)   Healthy Eating   Education delivered in previous session. Being Active   - Goals for exercise     Monitoring     - ADA blood glucose targets  - Frequency of monitoring  - Blood glucose schedule  - A1c interpretation  - Monitoring blood glucose trends per meter  - Alternative site testing  - Glucose monitoring technique  - Disposal of used lancets or needles  - Logging blood glucose results   Taking Medications - Medication review  - Medication schedule  - Insulin: Rapid-acting and long-acting   Healthy Coping Not addressed during this session. Reducing Risks Education delivered in previous session. Problem Solving Not addressed during this session. Note: Content derived from the American Association of Diabetes Educators' Diabetes Education Curriculum: A Guide to Successful Self-Management (2nd edition)         Diabetes Educator Recommendations:     - Continue addressing diabetes self-care behaviors through education and support in AADE7 Curriculum individual sessions on reducing risks, healthy eating, being active, monitoring, taking medications, healthy coping and problem solving.     - Continue practicing knowledge and skills relating to healthy eating, monitoring and being active to improve diabetes self-management. Pt took morning blood sugars every day but Sunday. They are as follows starting on Thursday 10/22:  154, 142, 140, 146, 130, 150    Per pt report, she is eating \"2 or so spoonfuls\" of rice at certain meals. Discussed importance of carbs at each meal. Last week, discussed being able to eat 3 slices of bread at her meal if she really wanted to and had no other carbs at her meal, but we spoke today and she said she actually couldn't even eat all 3 slices.  Pt also admits she probably does not eat enough vegetables, especially with lunch, so reinforced how eating vegetables with fiber can help slow down the carb digestion and not raise blood sugars as quickly. In regard to pt's metformin, she said she takes it at night but is worried about taking it in the morning because her daughter told her she does not want her taking \"all these medications. \" This also happened the same day she took it and had some stomach issues. We discussed that for right now she is only on metformin for her diabetes and if she adds the second dose to her regimen, it may help lower her A1C and morning blood sugars. Pt thinks they are really high and after discussing the target ranges she seems to understand that they are very close to where they need to be. Pt thought the metformin had to be taken closer to 12 hours apart but we discussed that she can take one dose before breakfast and one dose before dinner, even if it is less or more than 12 hours apart. Discussed finding patterns and trends in blood sugar by checking it different times throughout the day on different days as to avoid checking more than twice a day. Also discussed increasing physical activity may impact her sugars, as well as metformin dosing, illness, and stress. Pt was sticking her fingers on the front pad and not the side so we discussed proper technique. Covered rule of 15 for hypoglycemia. Pt also did not know proper disposal for lancets and asked where she can dispose of unused prescription pills. Informed patient of proper disposal of monitoring material and medications. Pt asked how to treat hyperglycemia because she remembers one time being in the 300s. We discussed drinking plenty of water, not eating more carbohydrates, and walking around her house/performing some sort of movement or activity if she was able. - Patient's Identified SMART Goal(s): - Increase physical activity by walking for 30 minutes 3 times over the next week.      Pt was only able to walk one time over the last week for 30 minutes because of the weather, but she wants to keep her goal the same. Pt and her  are going to [place] to visit daughters for a month or more and they say they walk almost every day there. - Pt to follow up with provider on the following: Pt is going to take her morning dose of metformin and told her to let provider know if GI issues persists more than 2 weeks. Diabetes Self-Management Education Follow-up Visit: 11/4/2020 at 2 pm    [facility] for Telehealth:    Loan Wadsworth is a 61 y.o. female being evaluated through a synchronous (real-time) audio-video technology platform, as a substitution for an in-person encounter, to address the healthcare issues mentioned above. I was in the office. The patient was at home. A caregiver was present when appropriate. The patient and/or her healthcare decision maker, is aware that this patient-initiated, Telehealth encounter on 10/28/2020 is a billable service, with coverage as determined by her insurance carrier. She is aware that she may receive a bill and has provided verbal consent to proceed: Yes. This telehealth encounter occurred during the COVID-19 pandemic and public health emergency. Evaluation of the following organ systems was limited: Vitals/Constitutional/EENT/Resp/CV/GI//MS/Neuro/Skin/Heme-Lymph-Imm. Pursuant to the emergency declaration under the [facility], [address] [place] [address] authority and the [address] and [employer] Act, this Virtual Visit was conducted with patient's (and/or legal guardian's) consent, to reduce the risk of exposure to COVID-19 and provide necessary medical care. --Domenic Dutta RD on 10/28/2020 at 4:00 PM    An electronic signature was used to authenticate this note.  I was in the office for the appointment and time spent: 53 minutes

## 2020-11-11 ENCOUNTER — DOCUMENTATION ONLY (OUTPATIENT)
Dept: DIABETES SERVICES | Age: 60
End: 2020-11-11

## 2020-11-11 NOTE — PROGRESS NOTES
11/12 1:30 PM    Called to reschedule appt with her and . Pt said they are in [place] visiting family and would like to do the visit when they come back, probably around the end of November. Pt said she will call back when they are back in [place].

## 2020-12-02 DIAGNOSIS — K21.9 GASTROESOPHAGEAL REFLUX DISEASE: ICD-10-CM

## 2020-12-02 RX ORDER — OMEPRAZOLE 10 MG/1
CAPSULE, DELAYED RELEASE ORAL
Qty: 30 CAP | Refills: 2 | Status: SHIPPED | OUTPATIENT
Start: 2020-12-02 | End: 2021-02-22

## 2020-12-16 ENCOUNTER — DOCUMENTATION ONLY (OUTPATIENT)
Dept: DIABETES SERVICES | Age: 60
End: 2020-12-16

## 2020-12-16 NOTE — PROGRESS NOTES
2:11 PM  I spoke with Ms. Marylen Pina, she and her  just returned home from [place] and will soon be going to [place] until early January. She has the [facility] scheduling number and stated she will call to make appointments for her and her  when they return. I will follow up.

## 2021-01-29 DIAGNOSIS — M77.30 CALCANEAL SPUR, UNSPECIFIED LATERALITY: ICD-10-CM

## 2021-02-06 RX ORDER — NAPROXEN 500 MG/1
TABLET ORAL
Qty: 60 TAB | Refills: 0 | Status: SHIPPED | OUTPATIENT
Start: 2021-02-06 | End: 2021-03-05

## 2021-02-15 ENCOUNTER — TRANSCRIBE ORDER (OUTPATIENT)
Dept: SCHEDULING | Age: 61
End: 2021-02-15

## 2021-02-15 DIAGNOSIS — Z12.31 VISIT FOR SCREENING MAMMOGRAM: Primary | ICD-10-CM

## 2021-02-22 DIAGNOSIS — K21.9 GASTROESOPHAGEAL REFLUX DISEASE: ICD-10-CM

## 2021-02-22 RX ORDER — OMEPRAZOLE 10 MG/1
CAPSULE, DELAYED RELEASE ORAL
Qty: 30 CAP | Refills: 2 | Status: SHIPPED | OUTPATIENT
Start: 2021-02-22 | End: 2021-06-04

## 2021-03-04 DIAGNOSIS — M77.30 CALCANEAL SPUR, UNSPECIFIED LATERALITY: ICD-10-CM

## 2021-03-04 NOTE — TELEPHONE ENCOUNTER
PCP: Salvador Hankins PA-C    Last appt: 9/28/2020  Future Appointments   Date Time Provider Remi Moody   3/4/2021  1:00 PM SPT MAMMO 1 SPTMAMMO SPT   3/22/2021  9:30 AM Cris Delarosa PA-C [facility] BS AMB       Requested Prescriptions     Pending Prescriptions Disp Refills    naproxen (NAPROSYN) 500 mg tablet [Pharmacy Med Name: NAPROXEN 500 MG TABLET] 60 Tab 0     Sig: TAKE 1 TABLET BY MOUTH TWICE A DAY AS NEEDED     No visits with results within 3 Month(s) from this visit. Latest known visit with results is:   Office Visit on 09/28/2020   Component Date Value Ref Range Status    Hemoglobin A1c 09/28/2020 6.4* 4.0 - 5.6 % Final    Comment: NEW METHOD PLEASE NOTE NEW REFERENCE RANGE  (NOTE)  HbA1C Interpretive Ranges  <5.7              Normal  5.7 - 6.4         Consider Prediabetes  >6.5              Consider Diabetes      Est. average glucose 09/28/2020 137  mg/dL Final    LIPID PROFILE 09/28/2020        Final    Cholesterol, total 09/28/2020 172  <200 MG/DL Final    Triglyceride 09/28/2020 252* <150 MG/DL Final    Comment: Based on NCEP-ATP III:  Triglycerides <150 mg/dL  is considered normal, 150-199  mg/dL  borderline high,  200-499 mg/dL high and  greater than or equal to 500  mg/dL very high.  HDL Cholesterol 09/28/2020 44  MG/DL Final    Comment: Based on NCEP ATP III, HDL Cholesterol <40 mg/dL is considered low and >60  mg/dL is elevated.  LDL, calculated 09/28/2020 77.6  0 - 100 MG/DL Final    Comment: Based on the NCEP-ATP: LDL-C concentrations are considered  optimal <100 mg/dL,  near optimal/above Normal 100-129 mg/dL Borderline High: 130-159, High: 160-189  mg/dL Very High: Greater than or equal to 190 mg/dL      VLDL, calculated 09/28/2020 50.4  MG/DL Final    CHOL/HDL Ratio 09/28/2020 3.9  0.0 - 5.0   Final    Microalbumin,urine random 09/28/2020 0.68  MG/DL Final    No reference range has been established.     Creatinine, urine 09/28/2020 114.00  mg/dL Final    No reference range has been established.  Microalbumin/Creat ratio (mg/g cre* 09/28/2020 6  0 - 30 mg/g Final    Protein, total 09/28/2020 7.8  6.4 - 8.2 g/dL Final    Albumin 09/28/2020 4.8  3.5 - 5.0 g/dL Final    Globulin 09/28/2020 3.0  2.0 - 4.0 g/dL Final    A-G Ratio 09/28/2020 1.6  1.1 - 2.2   Final    Bilirubin, total 09/28/2020 0.6  0.2 - 1.0 MG/DL Final    Bilirubin, direct 09/28/2020 0.1  0.0 - 0.2 MG/DL Final    Alk.  phosphatase 09/28/2020 119* 45 - 117 U/L Final    AST (SGOT) 09/28/2020 55* 15 - 37 U/L Final    ALT (SGPT) 09/28/2020 108* 12 - 78 U/L Final

## 2021-03-05 RX ORDER — NAPROXEN 500 MG/1
TABLET ORAL
Qty: 60 TAB | Refills: 0 | Status: SHIPPED | OUTPATIENT
Start: 2021-03-05 | End: 2021-04-03

## 2021-04-08 ENCOUNTER — NURSE TRIAGE (OUTPATIENT)
Dept: OTHER | Facility: CLINIC | Age: 61
End: 2021-04-08

## 2021-04-08 NOTE — TELEPHONE ENCOUNTER
Reason for Disposition   Wheezing is present    Answer Assessment - Initial Assessment Questions  1. ONSET: \"When did the cough begin? \"       ! 5 days ago, then I got better, now it's not getting better. 2. SEVERITY: \"How bad is the cough today? \"       Pretty bad    3. RESPIRATORY DISTRESS: \"Describe your breathing. \"       Wheezing and short of breath with exertion    4. FEVER: \"Do you have a fever? \" If so, ask: \"What is your temperature, how was it measured, and when did it start? \"      No    5. HEMOPTYSIS: \"Are you coughing up any blood? \" If so ask: \"How much? \" (flecks, streaks, tablespoons, etc.)      No    6. TREATMENT: \"What have you done so far to treat the cough? \" (e.g., meds, fluids, humidifier)      Vicks rub, steam, Mucinex for 2-3 days    7. CARDIAC HISTORY: \"Do you have any history of heart disease? \" (e.g., heart attack, congestive heart failure)       No    8. LUNG HISTORY: \"Do you have any history of lung disease? \"  (e.g., pulmonary embolus, asthma, emphysema)      No    9. PE RISK FACTORS: \"Do you have a history of blood clots? \" (or: recent major surgery, recent prolonged travel, bedridden)      No    10. OTHER SYMPTOMS: \"Do you have any other symptoms? (e.g., runny nose, wheezing, chest pain)        Wheezing, chest tightness    11. PREGNANCY: \"Is there any chance you are pregnant? \" \"When was your last menstrual period? \"        No    12. TRAVEL: \"Have you traveled out of the country in the last month? \" (e.g., travel history, exposures)        No    Protocols used: COUGH - ACUTE NON-PRODUCTIVE-ADULT-AH    Patient called [facility] with red flag complaint. Brief description of triage: cough and chest tightness with wheezing    Triage indicates for patient to see HCP within 4 hours    Care advice provided, patient verbalizes understanding; denies any other questions or concerns; instructed to call back for any new or worsening symptoms.     Writer provided warm transfer to [facility] for appointment scheduling. Attention Provider: Thank you for allowing me to participate in the care of your patient. The patient was connected to triage from Ramon Vazquez. Please do not respond through this encounter as the response is not directed to a shared pool.

## 2021-05-30 DIAGNOSIS — K21.9 GASTROESOPHAGEAL REFLUX DISEASE: ICD-10-CM

## 2021-05-30 DIAGNOSIS — I10 ESSENTIAL HYPERTENSION: ICD-10-CM

## 2021-06-03 LAB — HEMOGLOBIN A1C: 6.5 %

## 2021-06-04 RX ORDER — OMEPRAZOLE 10 MG/1
CAPSULE, DELAYED RELEASE ORAL
Qty: 30 CAPSULE | Refills: 2 | Status: SHIPPED | OUTPATIENT
Start: 2021-06-04

## 2021-06-04 RX ORDER — LOSARTAN POTASSIUM AND HYDROCHLOROTHIAZIDE 12.5; 1 MG/1; MG/1
TABLET ORAL
Qty: 30 TABLET | Refills: 7 | Status: SHIPPED | OUTPATIENT
Start: 2021-06-04

## 2021-06-04 NOTE — TELEPHONE ENCOUNTER
Please inform pt of my new location, I would be honored to continue to see her if my new office is convenient for her, she is overdue for F/U visit,  Please schedule visit (or let me know if she will be choosing other PCP)

## 2022-03-18 PROBLEM — K76.0 FATTY LIVER: Status: ACTIVE | Noted: 2017-08-31

## 2022-03-19 PROBLEM — E11.9 TYPE 2 DIABETES MELLITUS WITHOUT COMPLICATION, WITHOUT LONG-TERM CURRENT USE OF INSULIN (HCC): Status: ACTIVE | Noted: 2017-05-30

## 2022-03-19 PROBLEM — E78.1 HIGH TRIGLYCERIDES: Status: ACTIVE | Noted: 2017-05-30

## 2022-03-19 PROBLEM — R19.5 POSITIVE FIT (FECAL IMMUNOCHEMICAL TEST): Status: ACTIVE | Noted: 2018-02-22

## 2022-03-19 PROBLEM — R79.89 ELEVATED LFTS: Status: ACTIVE | Noted: 2019-12-16

## 2023-05-11 RX ORDER — ATORVASTATIN CALCIUM 40 MG/1
1 TABLET, FILM COATED ORAL DAILY
COMMUNITY
Start: 2021-01-29

## 2023-05-11 RX ORDER — METFORMIN HYDROCHLORIDE 500 MG/1
TABLET, EXTENDED RELEASE ORAL 2 TIMES DAILY WITH MEALS
COMMUNITY
Start: 2021-08-16

## 2023-05-11 RX ORDER — ALBUTEROL SULFATE 90 UG/1
AEROSOL, METERED RESPIRATORY (INHALATION)
COMMUNITY
Start: 2020-05-06

## 2023-05-11 RX ORDER — LOSARTAN POTASSIUM AND HYDROCHLOROTHIAZIDE 12.5; 1 MG/1; MG/1
1 TABLET ORAL DAILY
COMMUNITY
Start: 2021-06-04

## 2023-05-11 RX ORDER — OMEPRAZOLE 10 MG/1
1 CAPSULE, DELAYED RELEASE ORAL DAILY
COMMUNITY
Start: 2021-06-04

## 2023-05-11 RX ORDER — NAPROXEN 500 MG/1
1 TABLET ORAL 2 TIMES DAILY PRN
COMMUNITY
Start: 2021-08-16